# Patient Record
Sex: FEMALE | Race: AMERICAN INDIAN OR ALASKA NATIVE | NOT HISPANIC OR LATINO | Employment: OTHER | URBAN - METROPOLITAN AREA
[De-identification: names, ages, dates, MRNs, and addresses within clinical notes are randomized per-mention and may not be internally consistent; named-entity substitution may affect disease eponyms.]

---

## 2021-05-07 ENCOUNTER — OFFICE VISIT (OUTPATIENT)
Dept: FAMILY MEDICINE | Facility: CLINIC | Age: 41
End: 2021-05-07
Payer: COMMERCIAL

## 2021-05-07 ENCOUNTER — HOSPITAL ENCOUNTER (OUTPATIENT)
Dept: RADIOLOGY | Facility: CLINIC | Age: 41
Discharge: HOME | End: 2021-05-07
Attending: STUDENT IN AN ORGANIZED HEALTH CARE EDUCATION/TRAINING PROGRAM
Payer: COMMERCIAL

## 2021-05-07 ENCOUNTER — TELEPHONE (OUTPATIENT)
Dept: FAMILY MEDICINE | Facility: CLINIC | Age: 41
End: 2021-05-07

## 2021-05-07 ENCOUNTER — PATIENT OUTREACH (OUTPATIENT)
Dept: ADMINISTRATIVE | Facility: HOSPITAL | Age: 41
End: 2021-05-07

## 2021-05-07 VITALS
RESPIRATION RATE: 16 BRPM | SYSTOLIC BLOOD PRESSURE: 112 MMHG | WEIGHT: 253.31 LBS | OXYGEN SATURATION: 98 % | HEIGHT: 63 IN | BODY MASS INDEX: 44.88 KG/M2 | DIASTOLIC BLOOD PRESSURE: 74 MMHG | HEART RATE: 84 BPM | TEMPERATURE: 99 F

## 2021-05-07 DIAGNOSIS — R53.81 DEBILITY: ICD-10-CM

## 2021-05-07 DIAGNOSIS — Z99.3 WHEELCHAIR BOUND: ICD-10-CM

## 2021-05-07 DIAGNOSIS — R00.2 PALPITATIONS: ICD-10-CM

## 2021-05-07 DIAGNOSIS — Z00.00 ENCOUNTER FOR PREVENTIVE HEALTH EXAMINATION: Primary | ICD-10-CM

## 2021-05-07 DIAGNOSIS — Z11.59 NEED FOR HEPATITIS C SCREENING TEST: ICD-10-CM

## 2021-05-07 DIAGNOSIS — E66.01 SEVERE OBESITY WITH BODY MASS INDEX (BMI) OF 35.0 TO 39.9 WITH COMORBIDITY: ICD-10-CM

## 2021-05-07 DIAGNOSIS — Z12.31 ENCOUNTER FOR SCREENING MAMMOGRAM FOR MALIGNANT NEOPLASM OF BREAST: ICD-10-CM

## 2021-05-07 DIAGNOSIS — Z11.59 ENCOUNTER FOR HCV SCREENING TEST FOR LOW RISK PATIENT: ICD-10-CM

## 2021-05-07 DIAGNOSIS — Z13.220 SCREENING FOR LIPID DISORDERS: ICD-10-CM

## 2021-05-07 DIAGNOSIS — R53.81 PHYSICAL DECONDITIONING: ICD-10-CM

## 2021-05-07 DIAGNOSIS — Z79.899 POLYPHARMACY: ICD-10-CM

## 2021-05-07 DIAGNOSIS — G89.29 CHRONIC MIDLINE LOW BACK PAIN, UNSPECIFIED WHETHER SCIATICA PRESENT: ICD-10-CM

## 2021-05-07 DIAGNOSIS — R53.83 FATIGUE, UNSPECIFIED TYPE: ICD-10-CM

## 2021-05-07 DIAGNOSIS — F13.20 BENZODIAZEPINE DEPENDENCE, CONTINUOUS: ICD-10-CM

## 2021-05-07 DIAGNOSIS — Z51.81 THERAPEUTIC DRUG MONITORING: ICD-10-CM

## 2021-05-07 DIAGNOSIS — J30.9 ALLERGIC RHINITIS, UNSPECIFIED SEASONALITY, UNSPECIFIED TRIGGER: ICD-10-CM

## 2021-05-07 DIAGNOSIS — F17.219 NICOTINE DEPENDENCE, CIGARETTES, WITH UNSPECIFIED NICOTINE-INDUCED DISORDERS: ICD-10-CM

## 2021-05-07 DIAGNOSIS — M62.838 MUSCLE SPASMS OF BOTH LOWER EXTREMITIES: ICD-10-CM

## 2021-05-07 DIAGNOSIS — G47.00 INSOMNIA, UNSPECIFIED TYPE: ICD-10-CM

## 2021-05-07 DIAGNOSIS — I10 HYPERTENSION, UNSPECIFIED TYPE: ICD-10-CM

## 2021-05-07 DIAGNOSIS — F41.1 GAD (GENERALIZED ANXIETY DISORDER): ICD-10-CM

## 2021-05-07 DIAGNOSIS — G40.909 SEIZURE DISORDER: ICD-10-CM

## 2021-05-07 DIAGNOSIS — I69.354 HEMIPARESIS AFFECTING LEFT SIDE AS LATE EFFECT OF STROKE: ICD-10-CM

## 2021-05-07 DIAGNOSIS — G47.429 NARCOLEPSY DUE TO UNDERLYING CONDITION WITHOUT CATAPLEXY: ICD-10-CM

## 2021-05-07 DIAGNOSIS — M54.50 CHRONIC MIDLINE LOW BACK PAIN, UNSPECIFIED WHETHER SCIATICA PRESENT: ICD-10-CM

## 2021-05-07 DIAGNOSIS — N32.81 OVERACTIVE BLADDER: ICD-10-CM

## 2021-05-07 DIAGNOSIS — J45.20 MILD INTERMITTENT ASTHMA WITHOUT COMPLICATION: ICD-10-CM

## 2021-05-07 PROBLEM — K21.9 GASTROESOPHAGEAL REFLUX DISEASE: Status: ACTIVE | Noted: 2021-05-07

## 2021-05-07 PROBLEM — F19.20 POLYSUBSTANCE DEPENDENCE: Status: ACTIVE | Noted: 2021-05-07

## 2021-05-07 LAB
AMPHET+METHAMPHET UR QL: NEGATIVE
BARBITURATES UR QL SCN>200 NG/ML: NEGATIVE
BENZODIAZ UR QL SCN>200 NG/ML: NORMAL
BZE UR QL SCN: NEGATIVE
CANNABINOIDS UR QL SCN: NORMAL
CREAT UR-MCNC: 113 MG/DL (ref 15–325)
ETHANOL UR-MCNC: <10 MG/DL
METHADONE UR QL SCN>300 NG/ML: NEGATIVE
OPIATES UR QL SCN: NEGATIVE
PCP UR QL SCN>25 NG/ML: NEGATIVE
TOXICOLOGY INFORMATION: NORMAL

## 2021-05-07 PROCEDURE — 80307 DRUG TEST PRSMV CHEM ANLYZR: CPT | Performed by: STUDENT IN AN ORGANIZED HEALTH CARE EDUCATION/TRAINING PROGRAM

## 2021-05-07 PROCEDURE — 3008F PR BODY MASS INDEX (BMI) DOCUMENTED: ICD-10-PCS | Mod: CPTII,S$GLB,, | Performed by: STUDENT IN AN ORGANIZED HEALTH CARE EDUCATION/TRAINING PROGRAM

## 2021-05-07 PROCEDURE — 99999 PR PBB SHADOW E&M-NEW PATIENT-LVL V: ICD-10-PCS | Mod: PBBFAC,,, | Performed by: STUDENT IN AN ORGANIZED HEALTH CARE EDUCATION/TRAINING PROGRAM

## 2021-05-07 PROCEDURE — 99999 PR PBB SHADOW E&M-NEW PATIENT-LVL V: CPT | Mod: PBBFAC,,, | Performed by: STUDENT IN AN ORGANIZED HEALTH CARE EDUCATION/TRAINING PROGRAM

## 2021-05-07 PROCEDURE — 77063 MAMMO DIGITAL SCREENING BILAT WITH TOMO: ICD-10-PCS | Mod: 26,,, | Performed by: RADIOLOGY

## 2021-05-07 PROCEDURE — 1125F PR PAIN SEVERITY QUANTIFIED, PAIN PRESENT: ICD-10-PCS | Mod: S$GLB,,, | Performed by: STUDENT IN AN ORGANIZED HEALTH CARE EDUCATION/TRAINING PROGRAM

## 2021-05-07 PROCEDURE — 77067 SCR MAMMO BI INCL CAD: CPT | Mod: 26,,, | Performed by: RADIOLOGY

## 2021-05-07 PROCEDURE — 99204 PR OFFICE/OUTPT VISIT, NEW, LEVL IV, 45-59 MIN: ICD-10-PCS | Mod: S$GLB,,, | Performed by: STUDENT IN AN ORGANIZED HEALTH CARE EDUCATION/TRAINING PROGRAM

## 2021-05-07 PROCEDURE — 77067 SCR MAMMO BI INCL CAD: CPT | Mod: TC,PO

## 2021-05-07 PROCEDURE — 77067 MAMMO DIGITAL SCREENING BILAT WITH TOMO: ICD-10-PCS | Mod: 26,,, | Performed by: RADIOLOGY

## 2021-05-07 PROCEDURE — 3008F BODY MASS INDEX DOCD: CPT | Mod: CPTII,S$GLB,, | Performed by: STUDENT IN AN ORGANIZED HEALTH CARE EDUCATION/TRAINING PROGRAM

## 2021-05-07 PROCEDURE — 99204 OFFICE O/P NEW MOD 45 MIN: CPT | Mod: S$GLB,,, | Performed by: STUDENT IN AN ORGANIZED HEALTH CARE EDUCATION/TRAINING PROGRAM

## 2021-05-07 PROCEDURE — 1125F AMNT PAIN NOTED PAIN PRSNT: CPT | Mod: S$GLB,,, | Performed by: STUDENT IN AN ORGANIZED HEALTH CARE EDUCATION/TRAINING PROGRAM

## 2021-05-07 PROCEDURE — 77063 BREAST TOMOSYNTHESIS BI: CPT | Mod: 26,,, | Performed by: RADIOLOGY

## 2021-05-07 RX ORDER — LAMOTRIGINE 150 MG/1
200 TABLET ORAL 2 TIMES DAILY
COMMUNITY
End: 2021-09-20 | Stop reason: SDUPTHER

## 2021-05-07 RX ORDER — ALBUTEROL SULFATE 90 UG/1
180 POWDER, METERED RESPIRATORY (INHALATION) EVERY 4 HOURS
COMMUNITY
End: 2022-07-01

## 2021-05-07 RX ORDER — ARMODAFINIL 250 MG/1
250 TABLET ORAL DAILY
COMMUNITY
End: 2021-11-12

## 2021-05-07 RX ORDER — MONTELUKAST SODIUM 10 MG/1
10 TABLET ORAL NIGHTLY
Qty: 90 TABLET | Refills: 0 | Status: SHIPPED | OUTPATIENT
Start: 2021-05-07 | End: 2021-06-03

## 2021-05-07 RX ORDER — TOLTERODINE 2 MG/1
4 CAPSULE, EXTENDED RELEASE ORAL DAILY
Qty: 180 CAPSULE | Refills: 0 | Status: SHIPPED | OUTPATIENT
Start: 2021-05-07 | End: 2021-06-03

## 2021-05-07 RX ORDER — METOPROLOL SUCCINATE 50 MG/1
50 TABLET, EXTENDED RELEASE ORAL DAILY
Qty: 90 TABLET | Refills: 0 | Status: SHIPPED | OUTPATIENT
Start: 2021-05-07 | End: 2021-06-03

## 2021-05-07 RX ORDER — TEMAZEPAM 22.5 MG/1
30 CAPSULE ORAL NIGHTLY PRN
COMMUNITY
End: 2021-06-02 | Stop reason: CLARIF

## 2021-05-07 RX ORDER — HYDROCODONE BITARTRATE AND ACETAMINOPHEN 7.5; 325 MG/1; MG/1
1 TABLET ORAL EVERY 6 HOURS PRN
COMMUNITY
End: 2021-05-10 | Stop reason: SDUPTHER

## 2021-05-07 RX ORDER — TOLTERODINE 2 MG/1
4 CAPSULE, EXTENDED RELEASE ORAL DAILY
COMMUNITY
End: 2021-05-07 | Stop reason: SDUPTHER

## 2021-05-07 RX ORDER — MONTELUKAST SODIUM 10 MG/1
10 TABLET ORAL NIGHTLY
COMMUNITY
End: 2021-05-07 | Stop reason: SDUPTHER

## 2021-05-07 RX ORDER — GABAPENTIN 600 MG/1
600 TABLET ORAL 3 TIMES DAILY
COMMUNITY
End: 2021-05-26 | Stop reason: SDUPTHER

## 2021-05-07 RX ORDER — BENZTROPINE MESYLATE 1 MG/1
1 TABLET ORAL DAILY
COMMUNITY
End: 2022-09-13 | Stop reason: CLARIF

## 2021-05-07 RX ORDER — LANOLIN ALCOHOL/MO/W.PET/CERES
400 CREAM (GRAM) TOPICAL DAILY
COMMUNITY
End: 2021-05-07 | Stop reason: SDUPTHER

## 2021-05-07 RX ORDER — ACETAMINOPHEN 325 MG/1
325 TABLET ORAL EVERY 6 HOURS PRN
COMMUNITY

## 2021-05-07 RX ORDER — LORAZEPAM 0.5 MG/1
0.5 TABLET ORAL EVERY 6 HOURS PRN
COMMUNITY
End: 2023-02-09

## 2021-05-07 RX ORDER — LEVETIRACETAM 750 MG/1
750 TABLET ORAL 2 TIMES DAILY
COMMUNITY
End: 2021-09-20 | Stop reason: SDUPTHER

## 2021-05-07 RX ORDER — LOSARTAN POTASSIUM 50 MG/1
50 TABLET ORAL DAILY
COMMUNITY
End: 2021-05-07 | Stop reason: SDUPTHER

## 2021-05-07 RX ORDER — CYCLOBENZAPRINE HCL 10 MG
10 TABLET ORAL 3 TIMES DAILY PRN
COMMUNITY
End: 2021-05-07

## 2021-05-07 RX ORDER — FLUTICASONE PROPIONATE 50 MCG
1 SPRAY, SUSPENSION (ML) NASAL DAILY PRN
COMMUNITY
End: 2022-07-28

## 2021-05-07 RX ORDER — CANNABIDIOL 100 MG/ML
6 SOLUTION ORAL 2 TIMES DAILY
COMMUNITY
End: 2021-09-20 | Stop reason: SDUPTHER

## 2021-05-07 RX ORDER — GABAPENTIN 300 MG/1
300 CAPSULE ORAL 3 TIMES DAILY
COMMUNITY
End: 2021-05-26

## 2021-05-07 RX ORDER — METOPROLOL SUCCINATE 50 MG/1
50 TABLET, EXTENDED RELEASE ORAL DAILY
COMMUNITY
End: 2021-05-07 | Stop reason: SDUPTHER

## 2021-05-07 RX ORDER — LANOLIN ALCOHOL/MO/W.PET/CERES
400 CREAM (GRAM) TOPICAL DAILY
Qty: 90 TABLET | Refills: 0 | Status: SHIPPED | OUTPATIENT
Start: 2021-05-07 | End: 2021-08-05

## 2021-05-07 RX ORDER — CLOTRIMAZOLE 1 %
CREAM (GRAM) TOPICAL 2 TIMES DAILY PRN
COMMUNITY
End: 2022-09-13 | Stop reason: CLARIF

## 2021-05-07 RX ORDER — NORTRIPTYLINE HYDROCHLORIDE 25 MG/1
25 CAPSULE ORAL NIGHTLY
COMMUNITY
End: 2021-05-07

## 2021-05-07 RX ORDER — CHOLECALCIFEROL (VITAMIN D3) 25 MCG
1000 TABLET ORAL DAILY
COMMUNITY

## 2021-05-07 RX ORDER — LIDOCAINE 50 MG/G
1 PATCH TOPICAL
COMMUNITY
End: 2023-02-09

## 2021-05-07 RX ORDER — ONDANSETRON 4 MG/1
8 TABLET, FILM COATED ORAL EVERY 8 HOURS PRN
COMMUNITY

## 2021-05-07 RX ORDER — HYDROXYZINE HYDROCHLORIDE 25 MG/1
25 TABLET, FILM COATED ORAL 3 TIMES DAILY
COMMUNITY
End: 2021-05-07

## 2021-05-07 RX ORDER — LOSARTAN POTASSIUM 50 MG/1
50 TABLET ORAL NIGHTLY
Qty: 90 TABLET | Refills: 0 | Status: SHIPPED | OUTPATIENT
Start: 2021-05-07 | End: 2021-06-03

## 2021-05-07 RX ORDER — BENZTROPINE MESYLATE 0.5 MG/1
0.5 TABLET ORAL 2 TIMES DAILY
COMMUNITY
End: 2021-06-02 | Stop reason: CLARIF

## 2021-05-07 RX ORDER — LAMOTRIGINE 25 MG/1
25 TABLET ORAL 2 TIMES DAILY
COMMUNITY
End: 2021-09-20 | Stop reason: SDUPTHER

## 2021-05-10 ENCOUNTER — OFFICE VISIT (OUTPATIENT)
Dept: SPINE | Facility: CLINIC | Age: 41
End: 2021-05-10
Payer: COMMERCIAL

## 2021-05-10 VITALS — HEIGHT: 63 IN | WEIGHT: 253.31 LBS | BODY MASS INDEX: 44.88 KG/M2

## 2021-05-10 DIAGNOSIS — M54.2 CERVICALGIA: Primary | ICD-10-CM

## 2021-05-10 DIAGNOSIS — M54.50 CHRONIC MIDLINE LOW BACK PAIN, UNSPECIFIED WHETHER SCIATICA PRESENT: ICD-10-CM

## 2021-05-10 DIAGNOSIS — G89.29 CHRONIC MIDLINE LOW BACK PAIN, UNSPECIFIED WHETHER SCIATICA PRESENT: ICD-10-CM

## 2021-05-10 PROCEDURE — 1125F AMNT PAIN NOTED PAIN PRSNT: CPT | Mod: S$GLB,,, | Performed by: PHYSICAL MEDICINE & REHABILITATION

## 2021-05-10 PROCEDURE — 99204 PR OFFICE/OUTPT VISIT, NEW, LEVL IV, 45-59 MIN: ICD-10-PCS | Mod: S$GLB,,, | Performed by: PHYSICAL MEDICINE & REHABILITATION

## 2021-05-10 PROCEDURE — 99204 OFFICE O/P NEW MOD 45 MIN: CPT | Mod: S$GLB,,, | Performed by: PHYSICAL MEDICINE & REHABILITATION

## 2021-05-10 PROCEDURE — 3008F PR BODY MASS INDEX (BMI) DOCUMENTED: ICD-10-PCS | Mod: CPTII,S$GLB,, | Performed by: PHYSICAL MEDICINE & REHABILITATION

## 2021-05-10 PROCEDURE — 3008F BODY MASS INDEX DOCD: CPT | Mod: CPTII,S$GLB,, | Performed by: PHYSICAL MEDICINE & REHABILITATION

## 2021-05-10 PROCEDURE — 1125F PR PAIN SEVERITY QUANTIFIED, PAIN PRESENT: ICD-10-PCS | Mod: S$GLB,,, | Performed by: PHYSICAL MEDICINE & REHABILITATION

## 2021-05-10 RX ORDER — HYDROCODONE BITARTRATE AND ACETAMINOPHEN 7.5; 325 MG/1; MG/1
1 TABLET ORAL EVERY 6 HOURS PRN
Qty: 28 TABLET | Refills: 0 | Status: SHIPPED | OUTPATIENT
Start: 2021-05-10 | End: 2021-05-20 | Stop reason: SDUPTHER

## 2021-05-11 ENCOUNTER — TELEPHONE (OUTPATIENT)
Dept: NEUROLOGY | Facility: CLINIC | Age: 41
End: 2021-05-11

## 2021-05-11 ENCOUNTER — TELEPHONE (OUTPATIENT)
Dept: FAMILY MEDICINE | Facility: CLINIC | Age: 41
End: 2021-05-11

## 2021-05-17 ENCOUNTER — TELEPHONE (OUTPATIENT)
Dept: NEUROSURGERY | Facility: CLINIC | Age: 41
End: 2021-05-17

## 2021-05-17 DIAGNOSIS — M54.50 CHRONIC MIDLINE LOW BACK PAIN, UNSPECIFIED WHETHER SCIATICA PRESENT: ICD-10-CM

## 2021-05-17 DIAGNOSIS — G89.29 CHRONIC MIDLINE LOW BACK PAIN, UNSPECIFIED WHETHER SCIATICA PRESENT: ICD-10-CM

## 2021-05-17 DIAGNOSIS — G40.909 SEIZURE DISORDER: Primary | ICD-10-CM

## 2021-05-17 RX ORDER — GABAPENTIN 300 MG/1
300 CAPSULE ORAL 3 TIMES DAILY
Qty: 270 CAPSULE | Refills: 1 | Status: CANCELLED | OUTPATIENT
Start: 2021-05-17

## 2021-05-18 ENCOUNTER — PATIENT MESSAGE (OUTPATIENT)
Dept: FAMILY MEDICINE | Facility: CLINIC | Age: 41
End: 2021-05-18

## 2021-05-18 ENCOUNTER — DOCUMENTATION ONLY (OUTPATIENT)
Dept: FAMILY MEDICINE | Facility: CLINIC | Age: 41
End: 2021-05-18

## 2021-05-18 ENCOUNTER — TELEPHONE (OUTPATIENT)
Dept: FAMILY MEDICINE | Facility: CLINIC | Age: 41
End: 2021-05-18

## 2021-05-18 DIAGNOSIS — G40.812 NONINTRACTABLE LENNOX-GASTAUT SYNDROME WITHOUT STATUS EPILEPTICUS: ICD-10-CM

## 2021-05-18 DIAGNOSIS — I73.00 RAYNAUD'S PHENOMENON WITHOUT GANGRENE: ICD-10-CM

## 2021-05-18 DIAGNOSIS — E53.8 B12 DEFICIENCY: ICD-10-CM

## 2021-05-18 DIAGNOSIS — G89.4 CHRONIC PAIN SYNDROME: ICD-10-CM

## 2021-05-19 ENCOUNTER — TELEPHONE (OUTPATIENT)
Dept: FAMILY MEDICINE | Facility: CLINIC | Age: 41
End: 2021-05-19

## 2021-05-19 ENCOUNTER — TELEPHONE (OUTPATIENT)
Dept: SPINE | Facility: CLINIC | Age: 41
End: 2021-05-19

## 2021-05-19 DIAGNOSIS — G89.29 CHRONIC MIDLINE LOW BACK PAIN, UNSPECIFIED WHETHER SCIATICA PRESENT: ICD-10-CM

## 2021-05-19 DIAGNOSIS — M54.50 CHRONIC MIDLINE LOW BACK PAIN, UNSPECIFIED WHETHER SCIATICA PRESENT: ICD-10-CM

## 2021-05-20 RX ORDER — HYDROCODONE BITARTRATE AND ACETAMINOPHEN 7.5; 325 MG/1; MG/1
1 TABLET ORAL EVERY 6 HOURS PRN
Qty: 28 TABLET | Refills: 0 | Status: SHIPPED | OUTPATIENT
Start: 2021-05-20 | End: 2021-06-02 | Stop reason: CLARIF

## 2021-05-25 ENCOUNTER — OFFICE VISIT (OUTPATIENT)
Dept: PAIN MEDICINE | Facility: CLINIC | Age: 41
End: 2021-05-25
Payer: COMMERCIAL

## 2021-05-25 VITALS
BODY MASS INDEX: 45.18 KG/M2 | HEIGHT: 63 IN | WEIGHT: 255 LBS | DIASTOLIC BLOOD PRESSURE: 63 MMHG | HEART RATE: 81 BPM | SYSTOLIC BLOOD PRESSURE: 144 MMHG

## 2021-05-25 DIAGNOSIS — M54.50 CHRONIC MIDLINE LOW BACK PAIN, UNSPECIFIED WHETHER SCIATICA PRESENT: ICD-10-CM

## 2021-05-25 DIAGNOSIS — G89.29 CHRONIC MIDLINE LOW BACK PAIN, UNSPECIFIED WHETHER SCIATICA PRESENT: ICD-10-CM

## 2021-05-25 DIAGNOSIS — G40.909 SEIZURE DISORDER: ICD-10-CM

## 2021-05-25 DIAGNOSIS — M54.16 LUMBAR RADICULOPATHY: ICD-10-CM

## 2021-05-25 DIAGNOSIS — M50.30 DDD (DEGENERATIVE DISC DISEASE), CERVICAL: ICD-10-CM

## 2021-05-25 DIAGNOSIS — M51.36 DDD (DEGENERATIVE DISC DISEASE), LUMBAR: Primary | ICD-10-CM

## 2021-05-25 DIAGNOSIS — M51.34 DDD (DEGENERATIVE DISC DISEASE), THORACIC: ICD-10-CM

## 2021-05-25 DIAGNOSIS — F11.90 CHRONIC, CONTINUOUS USE OF OPIOIDS: ICD-10-CM

## 2021-05-25 PROCEDURE — 99999 PR PBB SHADOW E&M-EST. PATIENT-LVL V: CPT | Mod: PBBFAC,,, | Performed by: ANESTHESIOLOGY

## 2021-05-25 PROCEDURE — 3008F PR BODY MASS INDEX (BMI) DOCUMENTED: ICD-10-PCS | Mod: CPTII,S$GLB,, | Performed by: ANESTHESIOLOGY

## 2021-05-25 PROCEDURE — 1125F PR PAIN SEVERITY QUANTIFIED, PAIN PRESENT: ICD-10-PCS | Mod: S$GLB,,, | Performed by: ANESTHESIOLOGY

## 2021-05-25 PROCEDURE — 1125F AMNT PAIN NOTED PAIN PRSNT: CPT | Mod: S$GLB,,, | Performed by: ANESTHESIOLOGY

## 2021-05-25 PROCEDURE — 99214 OFFICE O/P EST MOD 30 MIN: CPT | Mod: S$GLB,,, | Performed by: ANESTHESIOLOGY

## 2021-05-25 PROCEDURE — 80307 DRUG TEST PRSMV CHEM ANLYZR: CPT | Performed by: ANESTHESIOLOGY

## 2021-05-25 PROCEDURE — 99999 PR PBB SHADOW E&M-EST. PATIENT-LVL V: ICD-10-PCS | Mod: PBBFAC,,, | Performed by: ANESTHESIOLOGY

## 2021-05-25 PROCEDURE — 99214 PR OFFICE/OUTPT VISIT, EST, LEVL IV, 30-39 MIN: ICD-10-PCS | Mod: S$GLB,,, | Performed by: ANESTHESIOLOGY

## 2021-05-25 PROCEDURE — 3008F BODY MASS INDEX DOCD: CPT | Mod: CPTII,S$GLB,, | Performed by: ANESTHESIOLOGY

## 2021-05-25 RX ORDER — HYDROCODONE BITARTRATE AND ACETAMINOPHEN 7.5; 325 MG/1; MG/1
1 TABLET ORAL EVERY 6 HOURS PRN
Qty: 120 TABLET | Refills: 0 | Status: ON HOLD | OUTPATIENT
Start: 2021-05-25 | End: 2021-06-14 | Stop reason: HOSPADM

## 2021-05-26 ENCOUNTER — PATIENT MESSAGE (OUTPATIENT)
Dept: ADMINISTRATIVE | Facility: OTHER | Age: 41
End: 2021-05-26

## 2021-05-26 RX ORDER — GABAPENTIN 600 MG/1
600 TABLET ORAL 3 TIMES DAILY
Qty: 90 TABLET | Refills: 0 | Status: SHIPPED | OUTPATIENT
Start: 2021-05-26 | End: 2022-05-09 | Stop reason: SDUPTHER

## 2021-05-26 RX ORDER — GABAPENTIN 300 MG/1
300 CAPSULE ORAL 3 TIMES DAILY
Qty: 90 CAPSULE | Refills: 0 | Status: CANCELLED | OUTPATIENT
Start: 2021-05-26

## 2021-05-31 LAB
6MAM UR QL: NOT DETECTED
7AMINOCLONAZEPAM UR QL: NOT DETECTED
A-OH ALPRAZ UR QL: NOT DETECTED
ALPHA-OH-MIDAZOLAM: NOT DETECTED
ALPRAZ UR QL: NOT DETECTED
AMPHET UR QL SCN: NOT DETECTED
ANNOTATION COMMENT IMP: NORMAL
ANNOTATION COMMENT IMP: NORMAL
BARBITURATES UR QL: NOT DETECTED
BUPRENORPHINE UR QL: NOT DETECTED
BZE UR QL: NOT DETECTED
CARBOXYTHC UR QL: PRESENT
CARISOPRODOL UR QL: NOT DETECTED
CLONAZEPAM UR QL: NOT DETECTED
CODEINE UR QL: NOT DETECTED
CREAT UR-MCNC: 364.2 MG/DL (ref 20–400)
DIAZEPAM UR QL: NOT DETECTED
ETHYL GLUCURONIDE UR QL: PRESENT
FENTANYL UR QL: NOT DETECTED
GABAPENTIN: NOT DETECTED
HYDROCODONE UR QL: PRESENT
HYDROMORPHONE UR QL: PRESENT
LORAZEPAM UR QL: NOT DETECTED
MDA UR QL: NOT DETECTED
MDEA UR QL: NOT DETECTED
MDMA UR QL: NOT DETECTED
ME-PHENIDATE UR QL: NOT DETECTED
METHADONE UR QL: NOT DETECTED
METHAMPHET UR QL: NOT DETECTED
MIDAZOLAM UR QL SCN: NOT DETECTED
MORPHINE UR QL: NOT DETECTED
NALOXONE: NOT DETECTED
NORBUPRENORPHINE UR QL CFM: NOT DETECTED
NORDIAZEPAM UR QL: NOT DETECTED
NORFENTANYL UR QL: NOT DETECTED
NORHYDROCODONE UR QL CFM: PRESENT
NORMEPERIDINE UR QL CFM: NOT DETECTED
NOROXYCODONE UR QL CFM: NOT DETECTED
NOROXYMORPHONE UR QL SCN: NOT DETECTED
OXAZEPAM UR QL: NOT DETECTED
OXYCODONE UR QL: NOT DETECTED
OXYMORPHONE UR QL: NOT DETECTED
PATHOLOGY STUDY: NORMAL
PCP UR QL: NOT DETECTED
PHENTERMINE UR QL: NOT DETECTED
PREGABALIN: NOT DETECTED
SERVICE CMNT-IMP: NORMAL
TAPENTADOL UR QL SCN: NOT DETECTED
TAPENTADOL-O-SULF: NOT DETECTED
TEMAZEPAM UR QL: NOT DETECTED
TRAMADOL UR QL: NOT DETECTED
ZOLPIDEM METABOLITE: NOT DETECTED
ZOLPIDEM UR QL: NOT DETECTED

## 2021-06-01 ENCOUNTER — HOSPITAL ENCOUNTER (OUTPATIENT)
Dept: RADIOLOGY | Facility: HOSPITAL | Age: 41
Discharge: HOME | End: 2021-06-01
Attending: NEUROLOGICAL SURGERY
Payer: COMMERCIAL

## 2021-06-01 ENCOUNTER — OFFICE VISIT (OUTPATIENT)
Dept: NEUROSURGERY | Facility: CLINIC | Age: 41
End: 2021-06-01
Payer: COMMERCIAL

## 2021-06-01 ENCOUNTER — TELEPHONE (OUTPATIENT)
Dept: FAMILY MEDICINE | Facility: CLINIC | Age: 41
End: 2021-06-01

## 2021-06-01 DIAGNOSIS — G40.812 NONINTRACTABLE LENNOX-GASTAUT SYNDROME WITHOUT STATUS EPILEPTICUS: Primary | ICD-10-CM

## 2021-06-01 DIAGNOSIS — Z96.89 S/P PLACEMENT OF VNS (VAGUS NERVE STIMULATION) DEVICE: ICD-10-CM

## 2021-06-01 DIAGNOSIS — Z96.89 S/P PLACEMENT OF VNS (VAGUS NERVE STIMULATION) DEVICE: Primary | ICD-10-CM

## 2021-06-01 PROCEDURE — 71046 X-RAY EXAM CHEST 2 VIEWS: CPT | Mod: 26,,, | Performed by: RADIOLOGY

## 2021-06-01 PROCEDURE — 99204 OFFICE O/P NEW MOD 45 MIN: CPT | Mod: S$GLB,,, | Performed by: NEUROLOGICAL SURGERY

## 2021-06-01 PROCEDURE — 71046 XR CHEST PA AND LATERAL: ICD-10-PCS | Mod: 26,,, | Performed by: RADIOLOGY

## 2021-06-01 PROCEDURE — 99999 PR PBB SHADOW E&M-EST. PATIENT-LVL III: CPT | Mod: PBBFAC,,, | Performed by: NEUROLOGICAL SURGERY

## 2021-06-01 PROCEDURE — 72040 X-RAY EXAM NECK SPINE 2-3 VW: CPT | Mod: 26,,, | Performed by: RADIOLOGY

## 2021-06-01 PROCEDURE — 72040 XR CERVICAL SPINE AP LATERAL: ICD-10-PCS | Mod: 26,,, | Performed by: RADIOLOGY

## 2021-06-01 PROCEDURE — 71046 X-RAY EXAM CHEST 2 VIEWS: CPT | Mod: TC

## 2021-06-01 PROCEDURE — 99204 PR OFFICE/OUTPT VISIT, NEW, LEVL IV, 45-59 MIN: ICD-10-PCS | Mod: S$GLB,,, | Performed by: NEUROLOGICAL SURGERY

## 2021-06-01 PROCEDURE — 99999 PR PBB SHADOW E&M-EST. PATIENT-LVL III: ICD-10-PCS | Mod: PBBFAC,,, | Performed by: NEUROLOGICAL SURGERY

## 2021-06-01 PROCEDURE — 72040 X-RAY EXAM NECK SPINE 2-3 VW: CPT | Mod: TC

## 2021-06-02 ENCOUNTER — TELEPHONE (OUTPATIENT)
Dept: FAMILY MEDICINE | Facility: CLINIC | Age: 41
End: 2021-06-02

## 2021-06-02 ENCOUNTER — TELEPHONE (OUTPATIENT)
Dept: SPINE | Facility: CLINIC | Age: 41
End: 2021-06-02

## 2021-06-02 ENCOUNTER — TELEPHONE (OUTPATIENT)
Dept: PREADMISSION TESTING | Facility: HOSPITAL | Age: 41
End: 2021-06-02

## 2021-06-02 DIAGNOSIS — G40.812 NONINTRACTABLE LENNOX-GASTAUT SYNDROME WITHOUT STATUS EPILEPTICUS: ICD-10-CM

## 2021-06-02 DIAGNOSIS — Z96.89 S/P PLACEMENT OF VNS (VAGUS NERVE STIMULATION) DEVICE: Primary | ICD-10-CM

## 2021-06-02 DIAGNOSIS — Z01.818 PREOPERATIVE TESTING: Primary | ICD-10-CM

## 2021-06-03 ENCOUNTER — LAB VISIT (OUTPATIENT)
Dept: LAB | Facility: HOSPITAL | Age: 41
End: 2021-06-03
Attending: ANESTHESIOLOGY
Payer: COMMERCIAL

## 2021-06-03 ENCOUNTER — OFFICE VISIT (OUTPATIENT)
Dept: FAMILY MEDICINE | Facility: CLINIC | Age: 41
End: 2021-06-03
Payer: COMMERCIAL

## 2021-06-03 VITALS
HEART RATE: 92 BPM | DIASTOLIC BLOOD PRESSURE: 80 MMHG | HEIGHT: 63 IN | BODY MASS INDEX: 45.32 KG/M2 | OXYGEN SATURATION: 98 % | TEMPERATURE: 99 F | RESPIRATION RATE: 16 BRPM | WEIGHT: 255.75 LBS | SYSTOLIC BLOOD PRESSURE: 124 MMHG

## 2021-06-03 DIAGNOSIS — E66.01 MORBID OBESITY: ICD-10-CM

## 2021-06-03 DIAGNOSIS — G89.4 CHRONIC PAIN SYNDROME: ICD-10-CM

## 2021-06-03 DIAGNOSIS — G47.429 NARCOLEPSY DUE TO UNDERLYING CONDITION WITHOUT CATAPLEXY: ICD-10-CM

## 2021-06-03 DIAGNOSIS — Z01.818 PREOPERATIVE TESTING: ICD-10-CM

## 2021-06-03 DIAGNOSIS — I69.354 HEMIPARESIS AFFECTING LEFT SIDE AS LATE EFFECT OF STROKE: ICD-10-CM

## 2021-06-03 DIAGNOSIS — Z86.73 HX OF TRANSIENT ISCHEMIC ATTACK (TIA): ICD-10-CM

## 2021-06-03 DIAGNOSIS — G40.812 NONINTRACTABLE LENNOX-GASTAUT SYNDROME WITHOUT STATUS EPILEPTICUS: ICD-10-CM

## 2021-06-03 DIAGNOSIS — M47.816 LUMBAR SPONDYLOSIS: ICD-10-CM

## 2021-06-03 DIAGNOSIS — F41.1 GAD (GENERALIZED ANXIETY DISORDER): ICD-10-CM

## 2021-06-03 DIAGNOSIS — Z01.818 PREOP EXAMINATION: Primary | ICD-10-CM

## 2021-06-03 DIAGNOSIS — J45.20 MILD INTERMITTENT ASTHMA WITHOUT COMPLICATION: ICD-10-CM

## 2021-06-03 DIAGNOSIS — E78.5 HYPERLIPIDEMIA, UNSPECIFIED HYPERLIPIDEMIA TYPE: ICD-10-CM

## 2021-06-03 DIAGNOSIS — G40.909 SEIZURE DISORDER: ICD-10-CM

## 2021-06-03 PROBLEM — M48.061 SPINAL STENOSIS OF LUMBAR REGION: Status: ACTIVE | Noted: 2021-06-03

## 2021-06-03 PROCEDURE — 36415 COLL VENOUS BLD VENIPUNCTURE: CPT | Mod: PO | Performed by: ANESTHESIOLOGY

## 2021-06-03 PROCEDURE — 3008F PR BODY MASS INDEX (BMI) DOCUMENTED: ICD-10-PCS | Mod: CPTII,S$GLB,, | Performed by: STUDENT IN AN ORGANIZED HEALTH CARE EDUCATION/TRAINING PROGRAM

## 2021-06-03 PROCEDURE — 93010 EKG 12-LEAD: ICD-10-PCS | Mod: S$GLB,,, | Performed by: INTERNAL MEDICINE

## 2021-06-03 PROCEDURE — 93005 ELECTROCARDIOGRAM TRACING: CPT | Mod: S$GLB,,, | Performed by: ANESTHESIOLOGY

## 2021-06-03 PROCEDURE — 99214 PR OFFICE/OUTPT VISIT, EST, LEVL IV, 30-39 MIN: ICD-10-PCS | Mod: S$GLB,,, | Performed by: STUDENT IN AN ORGANIZED HEALTH CARE EDUCATION/TRAINING PROGRAM

## 2021-06-03 PROCEDURE — 85730 THROMBOPLASTIN TIME PARTIAL: CPT | Performed by: ANESTHESIOLOGY

## 2021-06-03 PROCEDURE — 3008F BODY MASS INDEX DOCD: CPT | Mod: CPTII,S$GLB,, | Performed by: STUDENT IN AN ORGANIZED HEALTH CARE EDUCATION/TRAINING PROGRAM

## 2021-06-03 PROCEDURE — 99999 PR PBB SHADOW E&M-EST. PATIENT-LVL IV: ICD-10-PCS | Mod: PBBFAC,,, | Performed by: STUDENT IN AN ORGANIZED HEALTH CARE EDUCATION/TRAINING PROGRAM

## 2021-06-03 PROCEDURE — 93010 ELECTROCARDIOGRAM REPORT: CPT | Mod: S$GLB,,, | Performed by: INTERNAL MEDICINE

## 2021-06-03 PROCEDURE — 99214 OFFICE O/P EST MOD 30 MIN: CPT | Mod: S$GLB,,, | Performed by: STUDENT IN AN ORGANIZED HEALTH CARE EDUCATION/TRAINING PROGRAM

## 2021-06-03 PROCEDURE — 93005 EKG 12-LEAD: ICD-10-PCS | Mod: S$GLB,,, | Performed by: ANESTHESIOLOGY

## 2021-06-03 PROCEDURE — 85610 PROTHROMBIN TIME: CPT | Performed by: ANESTHESIOLOGY

## 2021-06-03 PROCEDURE — 1126F PR PAIN SEVERITY QUANTIFIED, NO PAIN PRESENT: ICD-10-PCS | Mod: S$GLB,,, | Performed by: STUDENT IN AN ORGANIZED HEALTH CARE EDUCATION/TRAINING PROGRAM

## 2021-06-03 PROCEDURE — 99999 PR PBB SHADOW E&M-EST. PATIENT-LVL IV: CPT | Mod: PBBFAC,,, | Performed by: STUDENT IN AN ORGANIZED HEALTH CARE EDUCATION/TRAINING PROGRAM

## 2021-06-03 PROCEDURE — 86900 BLOOD TYPING SEROLOGIC ABO: CPT | Performed by: ANESTHESIOLOGY

## 2021-06-03 PROCEDURE — 1126F AMNT PAIN NOTED NONE PRSNT: CPT | Mod: S$GLB,,, | Performed by: STUDENT IN AN ORGANIZED HEALTH CARE EDUCATION/TRAINING PROGRAM

## 2021-06-03 RX ORDER — CYCLOBENZAPRINE HCL 10 MG
TABLET ORAL
COMMUNITY
End: 2023-02-09

## 2021-06-03 RX ORDER — BUDESONIDE 0.5 MG/2ML
INHALANT ORAL
COMMUNITY
End: 2022-07-01

## 2021-06-03 RX ORDER — ROSUVASTATIN CALCIUM 20 MG/1
20 TABLET, COATED ORAL DAILY
Qty: 90 TABLET | Refills: 3 | Status: SHIPPED | OUTPATIENT
Start: 2021-06-03 | End: 2021-11-12 | Stop reason: SDUPTHER

## 2021-06-04 LAB
ABO + RH BLD: NORMAL
APTT BLDCRRT: 27.1 SEC (ref 21–32)
BLD GP AB SCN CELLS X3 SERPL QL: NORMAL
INR PPP: 1 (ref 0.8–1.2)
PROTHROMBIN TIME: 10.7 SEC (ref 9–12.5)

## 2021-06-08 ENCOUNTER — TELEPHONE (OUTPATIENT)
Dept: SPINE | Facility: CLINIC | Age: 41
End: 2021-06-08

## 2021-06-11 ENCOUNTER — TELEPHONE (OUTPATIENT)
Dept: NEUROSURGERY | Facility: CLINIC | Age: 41
End: 2021-06-11

## 2021-06-12 ENCOUNTER — LAB VISIT (OUTPATIENT)
Dept: PRIMARY CARE CLINIC | Facility: CLINIC | Age: 41
End: 2021-06-12
Payer: COMMERCIAL

## 2021-06-12 DIAGNOSIS — Z01.818 PRE-OP TESTING: ICD-10-CM

## 2021-06-12 PROCEDURE — U0005 INFEC AGEN DETEC AMPLI PROBE: HCPCS | Performed by: NEUROLOGICAL SURGERY

## 2021-06-12 PROCEDURE — U0003 INFECTIOUS AGENT DETECTION BY NUCLEIC ACID (DNA OR RNA); SEVERE ACUTE RESPIRATORY SYNDROME CORONAVIRUS 2 (SARS-COV-2) (CORONAVIRUS DISEASE [COVID-19]), AMPLIFIED PROBE TECHNIQUE, MAKING USE OF HIGH THROUGHPUT TECHNOLOGIES AS DESCRIBED BY CMS-2020-01-R: HCPCS | Performed by: NEUROLOGICAL SURGERY

## 2021-06-14 ENCOUNTER — ANESTHESIA (OUTPATIENT)
Dept: SURGERY | Facility: HOSPITAL | Age: 41
End: 2021-06-14
Payer: COMMERCIAL

## 2021-06-14 ENCOUNTER — ANESTHESIA EVENT (OUTPATIENT)
Dept: SURGERY | Facility: HOSPITAL | Age: 41
End: 2021-06-14
Payer: COMMERCIAL

## 2021-06-14 ENCOUNTER — HOSPITAL ENCOUNTER (OUTPATIENT)
Facility: HOSPITAL | Age: 41
Discharge: HOME | End: 2021-06-14
Attending: NEUROLOGICAL SURGERY | Admitting: NEUROLOGICAL SURGERY
Payer: COMMERCIAL

## 2021-06-14 VITALS
HEIGHT: 63 IN | BODY MASS INDEX: 45.18 KG/M2 | HEART RATE: 69 BPM | TEMPERATURE: 98 F | DIASTOLIC BLOOD PRESSURE: 70 MMHG | RESPIRATION RATE: 16 BRPM | OXYGEN SATURATION: 100 % | WEIGHT: 255 LBS | SYSTOLIC BLOOD PRESSURE: 126 MMHG

## 2021-06-14 DIAGNOSIS — G40.812 LENNOX-GASTAUT SYNDROME: ICD-10-CM

## 2021-06-14 LAB
SARS-COV-2 RDRP RESP QL NAA+PROBE: NEGATIVE
SARS-COV-2 RNA RESP QL NAA+PROBE: NOT DETECTED

## 2021-06-14 PROCEDURE — 25000003 PHARM REV CODE 250: Performed by: NURSE ANESTHETIST, CERTIFIED REGISTERED

## 2021-06-14 PROCEDURE — 64569 REVISE/REPL VAGUS N ELTRD: CPT | Mod: ,,, | Performed by: NEUROLOGICAL SURGERY

## 2021-06-14 PROCEDURE — 36000706: Performed by: NEUROLOGICAL SURGERY

## 2021-06-14 PROCEDURE — 64569 PR REVISE/REPLACE CRANIAL NERVE STIM ELECTRODE/CONNECT PULSE GEN: ICD-10-PCS | Mod: ,,, | Performed by: NEUROLOGICAL SURGERY

## 2021-06-14 PROCEDURE — D9220A PRA ANESTHESIA: ICD-10-PCS | Mod: ,,, | Performed by: NURSE ANESTHETIST, CERTIFIED REGISTERED

## 2021-06-14 PROCEDURE — D9220A PRA ANESTHESIA: Mod: ,,, | Performed by: STUDENT IN AN ORGANIZED HEALTH CARE EDUCATION/TRAINING PROGRAM

## 2021-06-14 PROCEDURE — 37000009 HC ANESTHESIA EA ADD 15 MINS: Performed by: NEUROLOGICAL SURGERY

## 2021-06-14 PROCEDURE — D9220A PRA ANESTHESIA: Mod: ,,, | Performed by: NURSE ANESTHETIST, CERTIFIED REGISTERED

## 2021-06-14 PROCEDURE — U0002 COVID-19 LAB TEST NON-CDC: HCPCS | Performed by: NEUROLOGICAL SURGERY

## 2021-06-14 PROCEDURE — 36000707: Performed by: NEUROLOGICAL SURGERY

## 2021-06-14 PROCEDURE — 63600175 PHARM REV CODE 636 W HCPCS: Performed by: STUDENT IN AN ORGANIZED HEALTH CARE EDUCATION/TRAINING PROGRAM

## 2021-06-14 PROCEDURE — 25000003 PHARM REV CODE 250: Performed by: STUDENT IN AN ORGANIZED HEALTH CARE EDUCATION/TRAINING PROGRAM

## 2021-06-14 PROCEDURE — 25000003 PHARM REV CODE 250: Performed by: NEUROLOGICAL SURGERY

## 2021-06-14 PROCEDURE — 37000008 HC ANESTHESIA 1ST 15 MINUTES: Performed by: NEUROLOGICAL SURGERY

## 2021-06-14 PROCEDURE — 71000015 HC POSTOP RECOV 1ST HR: Performed by: NEUROLOGICAL SURGERY

## 2021-06-14 PROCEDURE — 63600175 PHARM REV CODE 636 W HCPCS: Performed by: NURSE ANESTHETIST, CERTIFIED REGISTERED

## 2021-06-14 PROCEDURE — 71000044 HC DOSC ROUTINE RECOVERY FIRST HOUR: Performed by: NEUROLOGICAL SURGERY

## 2021-06-14 PROCEDURE — D9220A PRA ANESTHESIA: ICD-10-PCS | Mod: ,,, | Performed by: STUDENT IN AN ORGANIZED HEALTH CARE EDUCATION/TRAINING PROGRAM

## 2021-06-14 RX ORDER — PROPOFOL 10 MG/ML
VIAL (ML) INTRAVENOUS
Status: DISCONTINUED | OUTPATIENT
Start: 2021-06-14 | End: 2021-06-14

## 2021-06-14 RX ORDER — SODIUM CHLORIDE 9 MG/ML
INJECTION, SOLUTION INTRAVENOUS CONTINUOUS
Status: DISCONTINUED | OUTPATIENT
Start: 2021-06-14 | End: 2021-06-14 | Stop reason: HOSPADM

## 2021-06-14 RX ORDER — MUPIROCIN 20 MG/G
1 OINTMENT TOPICAL 2 TIMES DAILY
Status: DISCONTINUED | OUTPATIENT
Start: 2021-06-14 | End: 2021-06-14 | Stop reason: HOSPADM

## 2021-06-14 RX ORDER — OXYCODONE HYDROCHLORIDE 5 MG/1
5 TABLET ORAL EVERY 6 HOURS PRN
Status: DISCONTINUED | OUTPATIENT
Start: 2021-06-14 | End: 2021-06-14 | Stop reason: HOSPADM

## 2021-06-14 RX ORDER — HYDROCODONE BITARTRATE AND ACETAMINOPHEN 7.5; 325 MG/1; MG/1
1 TABLET ORAL EVERY 6 HOURS PRN
Qty: 28 TABLET | Refills: 0 | Status: SHIPPED | OUTPATIENT
Start: 2021-06-14 | End: 2021-06-21

## 2021-06-14 RX ORDER — LIDOCAINE HYDROCHLORIDE 10 MG/ML
1 INJECTION, SOLUTION EPIDURAL; INFILTRATION; INTRACAUDAL; PERINEURAL ONCE
Status: COMPLETED | OUTPATIENT
Start: 2021-06-14 | End: 2021-06-14

## 2021-06-14 RX ORDER — MIDAZOLAM HYDROCHLORIDE 1 MG/ML
INJECTION, SOLUTION INTRAMUSCULAR; INTRAVENOUS
Status: DISCONTINUED | OUTPATIENT
Start: 2021-06-14 | End: 2021-06-14

## 2021-06-14 RX ORDER — FENTANYL CITRATE 50 UG/ML
INJECTION, SOLUTION INTRAMUSCULAR; INTRAVENOUS
Status: DISCONTINUED | OUTPATIENT
Start: 2021-06-14 | End: 2021-06-14

## 2021-06-14 RX ORDER — DEXMEDETOMIDINE HYDROCHLORIDE 100 UG/ML
INJECTION, SOLUTION INTRAVENOUS
Status: DISCONTINUED | OUTPATIENT
Start: 2021-06-14 | End: 2021-06-14

## 2021-06-14 RX ORDER — HYDROMORPHONE HYDROCHLORIDE 2 MG/ML
INJECTION, SOLUTION INTRAMUSCULAR; INTRAVENOUS; SUBCUTANEOUS
Status: DISCONTINUED | OUTPATIENT
Start: 2021-06-14 | End: 2021-06-14

## 2021-06-14 RX ORDER — CEFAZOLIN SODIUM 1 G/3ML
2 INJECTION, POWDER, FOR SOLUTION INTRAMUSCULAR; INTRAVENOUS
Status: COMPLETED | OUTPATIENT
Start: 2021-06-14 | End: 2021-06-14

## 2021-06-14 RX ORDER — ROCURONIUM BROMIDE 10 MG/ML
INJECTION, SOLUTION INTRAVENOUS
Status: DISCONTINUED | OUTPATIENT
Start: 2021-06-14 | End: 2021-06-14

## 2021-06-14 RX ORDER — SODIUM CHLORIDE 0.9 % (FLUSH) 0.9 %
10 SYRINGE (ML) INJECTION
Status: DISCONTINUED | OUTPATIENT
Start: 2021-06-14 | End: 2021-06-14 | Stop reason: HOSPADM

## 2021-06-14 RX ORDER — ONDANSETRON 2 MG/ML
INJECTION INTRAMUSCULAR; INTRAVENOUS
Status: DISCONTINUED | OUTPATIENT
Start: 2021-06-14 | End: 2021-06-14

## 2021-06-14 RX ORDER — LIDOCAINE HYDROCHLORIDE AND EPINEPHRINE 10; 10 MG/ML; UG/ML
INJECTION, SOLUTION INFILTRATION; PERINEURAL
Status: DISCONTINUED | OUTPATIENT
Start: 2021-06-14 | End: 2021-06-14 | Stop reason: HOSPADM

## 2021-06-14 RX ORDER — LIDOCAINE HYDROCHLORIDE 20 MG/ML
INJECTION INTRAVENOUS
Status: DISCONTINUED | OUTPATIENT
Start: 2021-06-14 | End: 2021-06-14

## 2021-06-14 RX ORDER — NEOSTIGMINE METHYLSULFATE 0.5 MG/ML
INJECTION, SOLUTION INTRAVENOUS
Status: DISCONTINUED | OUTPATIENT
Start: 2021-06-14 | End: 2021-06-14

## 2021-06-14 RX ORDER — HYDROMORPHONE HYDROCHLORIDE 1 MG/ML
0.2 INJECTION, SOLUTION INTRAMUSCULAR; INTRAVENOUS; SUBCUTANEOUS EVERY 5 MIN PRN
Status: DISCONTINUED | OUTPATIENT
Start: 2021-06-14 | End: 2021-06-14 | Stop reason: HOSPADM

## 2021-06-14 RX ORDER — MUPIROCIN 20 MG/G
OINTMENT TOPICAL
Status: DISCONTINUED | OUTPATIENT
Start: 2021-06-14 | End: 2021-06-14 | Stop reason: HOSPADM

## 2021-06-14 RX ORDER — CEPHALEXIN 500 MG/1
500 CAPSULE ORAL EVERY 12 HOURS
Qty: 10 CAPSULE | Refills: 0 | Status: SHIPPED | OUTPATIENT
Start: 2021-06-14 | End: 2021-06-19

## 2021-06-14 RX ADMIN — FENTANYL CITRATE 50 MCG: 50 INJECTION, SOLUTION INTRAMUSCULAR; INTRAVENOUS at 01:06

## 2021-06-14 RX ADMIN — ROCURONIUM BROMIDE 30 MG: 10 INJECTION, SOLUTION INTRAVENOUS at 01:06

## 2021-06-14 RX ADMIN — MIDAZOLAM HYDROCHLORIDE 2 MG: 1 INJECTION, SOLUTION INTRAMUSCULAR; INTRAVENOUS at 01:06

## 2021-06-14 RX ADMIN — PROPOFOL 200 MG: 10 INJECTION, EMULSION INTRAVENOUS at 01:06

## 2021-06-14 RX ADMIN — LIDOCAINE HYDROCHLORIDE 2 MG: 10 INJECTION, SOLUTION EPIDURAL; INFILTRATION; INTRACAUDAL at 11:06

## 2021-06-14 RX ADMIN — CEFAZOLIN 2 G: 330 INJECTION, POWDER, FOR SOLUTION INTRAMUSCULAR; INTRAVENOUS at 01:06

## 2021-06-14 RX ADMIN — DEXMEDETOMIDINE HYDROCHLORIDE 12 MCG: 100 INJECTION, SOLUTION, CONCENTRATE INTRAVENOUS at 02:06

## 2021-06-14 RX ADMIN — MIDAZOLAM HYDROCHLORIDE 2 MG: 1 INJECTION, SOLUTION INTRAMUSCULAR; INTRAVENOUS at 12:06

## 2021-06-14 RX ADMIN — GLYCOPYRROLATE 0.6 MCG: 0.2 INJECTION, SOLUTION INTRAMUSCULAR; INTRAVITREAL at 02:06

## 2021-06-14 RX ADMIN — ONDANSETRON 4 MG: 2 INJECTION INTRAMUSCULAR; INTRAVENOUS at 01:06

## 2021-06-14 RX ADMIN — LIDOCAINE HYDROCHLORIDE 60 MG: 20 INJECTION, SOLUTION INTRAVENOUS at 01:06

## 2021-06-14 RX ADMIN — NEOSTIGMINE METHYLSULFATE 5 MG: 0.5 INJECTION INTRAVENOUS at 02:06

## 2021-06-14 RX ADMIN — HYDROMORPHONE HYDROCHLORIDE 0.8 MG: 2 INJECTION INTRAMUSCULAR; INTRAVENOUS; SUBCUTANEOUS at 02:06

## 2021-06-14 RX ADMIN — ROCURONIUM BROMIDE 20 MG: 10 INJECTION, SOLUTION INTRAVENOUS at 01:06

## 2021-06-14 RX ADMIN — OXYCODONE 5 MG: 5 TABLET ORAL at 04:06

## 2021-06-14 RX ADMIN — SODIUM CHLORIDE: 0.9 INJECTION, SOLUTION INTRAVENOUS at 11:06

## 2021-06-14 RX ADMIN — ROCURONIUM BROMIDE 40 MG: 10 INJECTION, SOLUTION INTRAVENOUS at 01:06

## 2021-06-17 ENCOUNTER — PATIENT OUTREACH (OUTPATIENT)
Dept: ADMINISTRATIVE | Facility: OTHER | Age: 41
End: 2021-06-17

## 2021-06-21 ENCOUNTER — OFFICE VISIT (OUTPATIENT)
Dept: SPINE | Facility: CLINIC | Age: 41
End: 2021-06-21
Payer: COMMERCIAL

## 2021-06-21 DIAGNOSIS — G89.29 CHRONIC MIDLINE LOW BACK PAIN, UNSPECIFIED WHETHER SCIATICA PRESENT: Primary | ICD-10-CM

## 2021-06-21 DIAGNOSIS — M54.50 CHRONIC MIDLINE LOW BACK PAIN, UNSPECIFIED WHETHER SCIATICA PRESENT: Primary | ICD-10-CM

## 2021-06-21 PROCEDURE — 99213 PR OFFICE/OUTPT VISIT, EST, LEVL III, 20-29 MIN: ICD-10-PCS | Mod: S$GLB,,, | Performed by: PHYSICAL MEDICINE & REHABILITATION

## 2021-06-21 PROCEDURE — 99213 OFFICE O/P EST LOW 20 MIN: CPT | Mod: S$GLB,,, | Performed by: PHYSICAL MEDICINE & REHABILITATION

## 2021-06-22 ENCOUNTER — TELEPHONE (OUTPATIENT)
Dept: SPINE | Facility: CLINIC | Age: 41
End: 2021-06-22

## 2021-06-22 DIAGNOSIS — M48.061 SPINAL STENOSIS OF LUMBAR REGION, UNSPECIFIED WHETHER NEUROGENIC CLAUDICATION PRESENT: Primary | ICD-10-CM

## 2021-06-23 ENCOUNTER — TELEPHONE (OUTPATIENT)
Dept: PAIN MEDICINE | Facility: CLINIC | Age: 41
End: 2021-06-23

## 2021-06-23 DIAGNOSIS — Z01.818 PRE-OP TESTING: Primary | ICD-10-CM

## 2021-06-24 ENCOUNTER — TELEPHONE (OUTPATIENT)
Dept: NEUROSURGERY | Facility: CLINIC | Age: 41
End: 2021-06-24

## 2021-06-24 ENCOUNTER — TELEPHONE (OUTPATIENT)
Dept: NEUROLOGY | Facility: CLINIC | Age: 41
End: 2021-06-24

## 2021-06-28 ENCOUNTER — TELEPHONE (OUTPATIENT)
Dept: NEUROSURGERY | Facility: CLINIC | Age: 41
End: 2021-06-28

## 2021-06-28 ENCOUNTER — PATIENT MESSAGE (OUTPATIENT)
Dept: NEUROSURGERY | Facility: CLINIC | Age: 41
End: 2021-06-28

## 2021-06-29 ENCOUNTER — OFFICE VISIT (OUTPATIENT)
Dept: PAIN MEDICINE | Facility: CLINIC | Age: 41
End: 2021-06-29
Payer: COMMERCIAL

## 2021-06-29 VITALS
HEIGHT: 63 IN | BODY MASS INDEX: 45.18 KG/M2 | WEIGHT: 255 LBS | DIASTOLIC BLOOD PRESSURE: 81 MMHG | SYSTOLIC BLOOD PRESSURE: 124 MMHG | HEART RATE: 96 BPM

## 2021-06-29 DIAGNOSIS — M51.36 DDD (DEGENERATIVE DISC DISEASE), LUMBAR: Primary | ICD-10-CM

## 2021-06-29 DIAGNOSIS — F11.90 CHRONIC, CONTINUOUS USE OF OPIOIDS: ICD-10-CM

## 2021-06-29 DIAGNOSIS — M54.16 LUMBAR RADICULOPATHY: ICD-10-CM

## 2021-06-29 PROCEDURE — 3008F PR BODY MASS INDEX (BMI) DOCUMENTED: ICD-10-PCS | Mod: CPTII,S$GLB,, | Performed by: ANESTHESIOLOGY

## 2021-06-29 PROCEDURE — 99999 PR PBB SHADOW E&M-EST. PATIENT-LVL III: CPT | Mod: PBBFAC,,, | Performed by: ANESTHESIOLOGY

## 2021-06-29 PROCEDURE — 1125F AMNT PAIN NOTED PAIN PRSNT: CPT | Mod: S$GLB,,, | Performed by: ANESTHESIOLOGY

## 2021-06-29 PROCEDURE — 1125F PR PAIN SEVERITY QUANTIFIED, PAIN PRESENT: ICD-10-PCS | Mod: S$GLB,,, | Performed by: ANESTHESIOLOGY

## 2021-06-29 PROCEDURE — 99214 PR OFFICE/OUTPT VISIT, EST, LEVL IV, 30-39 MIN: ICD-10-PCS | Mod: S$GLB,,, | Performed by: ANESTHESIOLOGY

## 2021-06-29 PROCEDURE — 99214 OFFICE O/P EST MOD 30 MIN: CPT | Mod: S$GLB,,, | Performed by: ANESTHESIOLOGY

## 2021-06-29 PROCEDURE — 80307 DRUG TEST PRSMV CHEM ANLYZR: CPT | Performed by: ANESTHESIOLOGY

## 2021-06-29 PROCEDURE — 99999 PR PBB SHADOW E&M-EST. PATIENT-LVL III: ICD-10-PCS | Mod: PBBFAC,,, | Performed by: ANESTHESIOLOGY

## 2021-06-29 PROCEDURE — 3008F BODY MASS INDEX DOCD: CPT | Mod: CPTII,S$GLB,, | Performed by: ANESTHESIOLOGY

## 2021-06-29 RX ORDER — HYDROCODONE BITARTRATE AND ACETAMINOPHEN 7.5; 325 MG/1; MG/1
1 TABLET ORAL EVERY 6 HOURS PRN
Qty: 120 TABLET | Refills: 0 | Status: SHIPPED | OUTPATIENT
Start: 2021-06-29 | End: 2021-07-26 | Stop reason: SDUPTHER

## 2021-07-04 LAB

## 2021-07-06 ENCOUNTER — PATIENT MESSAGE (OUTPATIENT)
Dept: RHEUMATOLOGY | Facility: CLINIC | Age: 41
End: 2021-07-06

## 2021-07-07 ENCOUNTER — PATIENT MESSAGE (OUTPATIENT)
Dept: ADMINISTRATIVE | Facility: HOSPITAL | Age: 41
End: 2021-07-07

## 2021-07-09 ENCOUNTER — LAB VISIT (OUTPATIENT)
Dept: PRIMARY CARE CLINIC | Facility: CLINIC | Age: 41
End: 2021-07-09
Payer: COMMERCIAL

## 2021-07-09 DIAGNOSIS — Z01.818 PRE-OP TESTING: ICD-10-CM

## 2021-07-09 PROCEDURE — U0005 INFEC AGEN DETEC AMPLI PROBE: HCPCS | Performed by: ANESTHESIOLOGY

## 2021-07-09 PROCEDURE — U0003 INFECTIOUS AGENT DETECTION BY NUCLEIC ACID (DNA OR RNA); SEVERE ACUTE RESPIRATORY SYNDROME CORONAVIRUS 2 (SARS-COV-2) (CORONAVIRUS DISEASE [COVID-19]), AMPLIFIED PROBE TECHNIQUE, MAKING USE OF HIGH THROUGHPUT TECHNOLOGIES AS DESCRIBED BY CMS-2020-01-R: HCPCS | Performed by: ANESTHESIOLOGY

## 2021-07-10 LAB
SARS-COV-2 RNA RESP QL NAA+PROBE: NOT DETECTED
SARS-COV-2- CYCLE NUMBER: -1

## 2021-07-12 ENCOUNTER — HOSPITAL ENCOUNTER (OUTPATIENT)
Facility: AMBULARY SURGERY CENTER | Age: 41
Discharge: HOME | End: 2021-07-12
Attending: ANESTHESIOLOGY | Admitting: ANESTHESIOLOGY
Payer: COMMERCIAL

## 2021-07-12 DIAGNOSIS — M51.36 DEGENERATIVE DISC DISEASE, LUMBAR: Primary | ICD-10-CM

## 2021-07-12 PROBLEM — M51.369 DEGENERATIVE DISC DISEASE, LUMBAR: Status: ACTIVE | Noted: 2021-07-12

## 2021-07-12 PROCEDURE — 62323 PR INJ LUMBAR/SACRAL, W/IMAGING GUIDANCE: ICD-10-PCS | Mod: ,,, | Performed by: ANESTHESIOLOGY

## 2021-07-12 PROCEDURE — 62323 NJX INTERLAMINAR LMBR/SAC: CPT | Performed by: ANESTHESIOLOGY

## 2021-07-12 PROCEDURE — 62323 NJX INTERLAMINAR LMBR/SAC: CPT | Mod: ,,, | Performed by: ANESTHESIOLOGY

## 2021-07-12 RX ORDER — SODIUM CHLORIDE 9 MG/ML
INJECTION, SOLUTION INTRAMUSCULAR; INTRAVENOUS; SUBCUTANEOUS
Status: DISCONTINUED | OUTPATIENT
Start: 2021-07-12 | End: 2021-07-12 | Stop reason: HOSPADM

## 2021-07-12 RX ORDER — LIDOCAINE HYDROCHLORIDE 10 MG/ML
INJECTION, SOLUTION EPIDURAL; INFILTRATION; INTRACAUDAL; PERINEURAL
Status: DISCONTINUED | OUTPATIENT
Start: 2021-07-12 | End: 2021-07-12 | Stop reason: HOSPADM

## 2021-07-12 RX ORDER — MIDAZOLAM HYDROCHLORIDE 2 MG/2ML
INJECTION, SOLUTION INTRAMUSCULAR; INTRAVENOUS
Status: DISCONTINUED | OUTPATIENT
Start: 2021-07-12 | End: 2021-07-12 | Stop reason: HOSPADM

## 2021-07-12 RX ORDER — SODIUM CHLORIDE, SODIUM LACTATE, POTASSIUM CHLORIDE, CALCIUM CHLORIDE 600; 310; 30; 20 MG/100ML; MG/100ML; MG/100ML; MG/100ML
INJECTION, SOLUTION INTRAVENOUS ONCE AS NEEDED
Status: COMPLETED | OUTPATIENT
Start: 2021-07-12 | End: 2021-07-12

## 2021-07-12 RX ORDER — FENTANYL CITRATE 50 UG/ML
INJECTION, SOLUTION INTRAMUSCULAR; INTRAVENOUS
Status: DISCONTINUED | OUTPATIENT
Start: 2021-07-12 | End: 2021-07-12 | Stop reason: HOSPADM

## 2021-07-12 RX ORDER — DEXAMETHASONE SODIUM PHOSPHATE 10 MG/ML
INJECTION INTRAMUSCULAR; INTRAVENOUS
Status: DISCONTINUED | OUTPATIENT
Start: 2021-07-12 | End: 2021-07-12 | Stop reason: HOSPADM

## 2021-07-12 RX ADMIN — SODIUM CHLORIDE, SODIUM LACTATE, POTASSIUM CHLORIDE, CALCIUM CHLORIDE: 600; 310; 30; 20 INJECTION, SOLUTION INTRAVENOUS at 12:07

## 2021-07-16 ENCOUNTER — TELEPHONE (OUTPATIENT)
Dept: NEUROLOGY | Facility: CLINIC | Age: 41
End: 2021-07-16

## 2021-07-16 VITALS
RESPIRATION RATE: 18 BRPM | WEIGHT: 255 LBS | BODY MASS INDEX: 45.18 KG/M2 | DIASTOLIC BLOOD PRESSURE: 78 MMHG | HEIGHT: 63 IN | SYSTOLIC BLOOD PRESSURE: 124 MMHG | OXYGEN SATURATION: 98 % | TEMPERATURE: 34 F | HEART RATE: 87 BPM

## 2021-07-22 ENCOUNTER — TELEPHONE (OUTPATIENT)
Dept: SPINE | Facility: CLINIC | Age: 41
End: 2021-07-22

## 2021-07-22 ENCOUNTER — TELEPHONE (OUTPATIENT)
Dept: PULMONOLOGY | Facility: CLINIC | Age: 41
End: 2021-07-22

## 2021-07-22 DIAGNOSIS — M54.6 PAIN IN THORACIC SPINE: Primary | ICD-10-CM

## 2021-07-23 ENCOUNTER — TELEPHONE (OUTPATIENT)
Dept: SPINE | Facility: CLINIC | Age: 41
End: 2021-07-23

## 2021-07-23 ENCOUNTER — HOSPITAL ENCOUNTER (OUTPATIENT)
Dept: RADIOLOGY | Facility: HOSPITAL | Age: 41
Discharge: HOME | End: 2021-07-23
Attending: PHYSICAL MEDICINE & REHABILITATION
Payer: COMMERCIAL

## 2021-07-23 DIAGNOSIS — M54.6 PAIN IN THORACIC SPINE: ICD-10-CM

## 2021-07-23 PROCEDURE — 72070 XR THORACIC SPINE AP LATERAL: ICD-10-PCS | Mod: 26,,, | Performed by: RADIOLOGY

## 2021-07-23 PROCEDURE — 72070 X-RAY EXAM THORAC SPINE 2VWS: CPT | Mod: 26,,, | Performed by: RADIOLOGY

## 2021-07-23 PROCEDURE — 72070 X-RAY EXAM THORAC SPINE 2VWS: CPT | Mod: TC,FY

## 2021-07-26 DIAGNOSIS — M51.36 DDD (DEGENERATIVE DISC DISEASE), LUMBAR: ICD-10-CM

## 2021-07-26 DIAGNOSIS — M54.16 LUMBAR RADICULOPATHY: ICD-10-CM

## 2021-07-26 RX ORDER — HYDROCODONE BITARTRATE AND ACETAMINOPHEN 7.5; 325 MG/1; MG/1
1 TABLET ORAL EVERY 6 HOURS PRN
Qty: 120 TABLET | Refills: 0 | Status: SHIPPED | OUTPATIENT
Start: 2021-07-27 | End: 2022-02-03 | Stop reason: SDUPTHER

## 2021-07-27 DIAGNOSIS — N32.81 OVERACTIVE BLADDER: ICD-10-CM

## 2021-07-27 RX ORDER — TOLTERODINE 2 MG/1
4 CAPSULE, EXTENDED RELEASE ORAL DAILY
Qty: 180 CAPSULE | Refills: 0 | Status: SHIPPED | OUTPATIENT
Start: 2021-07-27 | End: 2021-11-12 | Stop reason: SDUPTHER

## 2021-08-02 ENCOUNTER — TELEPHONE (OUTPATIENT)
Dept: SPINE | Facility: CLINIC | Age: 41
End: 2021-08-02

## 2021-08-02 ENCOUNTER — PATIENT MESSAGE (OUTPATIENT)
Dept: SPINE | Facility: CLINIC | Age: 41
End: 2021-08-02

## 2021-08-02 DIAGNOSIS — M54.2 CERVICALGIA: Primary | ICD-10-CM

## 2021-08-03 ENCOUNTER — HOSPITAL ENCOUNTER (OUTPATIENT)
Dept: RADIOLOGY | Facility: CLINIC | Age: 41
Discharge: HOME | End: 2021-08-03
Attending: PHYSICAL MEDICINE & REHABILITATION
Payer: COMMERCIAL

## 2021-08-03 ENCOUNTER — TELEPHONE (OUTPATIENT)
Dept: SPINE | Facility: CLINIC | Age: 41
End: 2021-08-03

## 2021-08-03 ENCOUNTER — PATIENT MESSAGE (OUTPATIENT)
Dept: SPINE | Facility: CLINIC | Age: 41
End: 2021-08-03

## 2021-08-03 DIAGNOSIS — M54.2 CERVICALGIA: ICD-10-CM

## 2021-08-03 PROCEDURE — 72040 X-RAY EXAM NECK SPINE 2-3 VW: CPT | Mod: 26,,, | Performed by: RADIOLOGY

## 2021-08-03 PROCEDURE — 72040 X-RAY EXAM NECK SPINE 2-3 VW: CPT | Mod: TC,FY,PO

## 2021-08-03 PROCEDURE — 72040 XR CERVICAL SPINE AP LATERAL: ICD-10-PCS | Mod: 26,,, | Performed by: RADIOLOGY

## 2021-08-04 ENCOUNTER — PATIENT MESSAGE (OUTPATIENT)
Dept: RHEUMATOLOGY | Facility: CLINIC | Age: 41
End: 2021-08-04

## 2021-08-06 ENCOUNTER — PATIENT OUTREACH (OUTPATIENT)
Dept: ADMINISTRATIVE | Facility: OTHER | Age: 41
End: 2021-08-06

## 2021-08-10 ENCOUNTER — OFFICE VISIT (OUTPATIENT)
Dept: PAIN MEDICINE | Facility: CLINIC | Age: 41
End: 2021-08-10
Payer: COMMERCIAL

## 2021-08-10 ENCOUNTER — PATIENT MESSAGE (OUTPATIENT)
Dept: RHEUMATOLOGY | Facility: CLINIC | Age: 41
End: 2021-08-10

## 2021-08-10 VITALS
HEIGHT: 63 IN | DIASTOLIC BLOOD PRESSURE: 77 MMHG | HEART RATE: 81 BPM | WEIGHT: 255.06 LBS | SYSTOLIC BLOOD PRESSURE: 121 MMHG | BODY MASS INDEX: 45.19 KG/M2

## 2021-08-10 DIAGNOSIS — M51.36 DDD (DEGENERATIVE DISC DISEASE), LUMBAR: Primary | ICD-10-CM

## 2021-08-10 DIAGNOSIS — M54.16 LUMBAR RADICULOPATHY: ICD-10-CM

## 2021-08-10 DIAGNOSIS — F11.90 CHRONIC, CONTINUOUS USE OF OPIOIDS: ICD-10-CM

## 2021-08-10 DIAGNOSIS — G40.909 SEIZURE DISORDER: ICD-10-CM

## 2021-08-10 DIAGNOSIS — M51.34 DDD (DEGENERATIVE DISC DISEASE), THORACIC: ICD-10-CM

## 2021-08-10 DIAGNOSIS — M50.30 DDD (DEGENERATIVE DISC DISEASE), CERVICAL: ICD-10-CM

## 2021-08-10 PROCEDURE — 3008F BODY MASS INDEX DOCD: CPT | Mod: CPTII,S$GLB,, | Performed by: ANESTHESIOLOGY

## 2021-08-10 PROCEDURE — 99214 OFFICE O/P EST MOD 30 MIN: CPT | Mod: S$GLB,,, | Performed by: ANESTHESIOLOGY

## 2021-08-10 PROCEDURE — 80307 DRUG TEST PRSMV CHEM ANLYZR: CPT | Performed by: ANESTHESIOLOGY

## 2021-08-10 PROCEDURE — 1159F MED LIST DOCD IN RCRD: CPT | Mod: CPTII,S$GLB,, | Performed by: ANESTHESIOLOGY

## 2021-08-10 PROCEDURE — 1125F PR PAIN SEVERITY QUANTIFIED, PAIN PRESENT: ICD-10-PCS | Mod: CPTII,S$GLB,, | Performed by: ANESTHESIOLOGY

## 2021-08-10 PROCEDURE — 3044F HG A1C LEVEL LT 7.0%: CPT | Mod: CPTII,S$GLB,, | Performed by: ANESTHESIOLOGY

## 2021-08-10 PROCEDURE — 1159F PR MEDICATION LIST DOCUMENTED IN MEDICAL RECORD: ICD-10-PCS | Mod: CPTII,S$GLB,, | Performed by: ANESTHESIOLOGY

## 2021-08-10 PROCEDURE — 3074F SYST BP LT 130 MM HG: CPT | Mod: CPTII,S$GLB,, | Performed by: ANESTHESIOLOGY

## 2021-08-10 PROCEDURE — 1125F AMNT PAIN NOTED PAIN PRSNT: CPT | Mod: CPTII,S$GLB,, | Performed by: ANESTHESIOLOGY

## 2021-08-10 PROCEDURE — 3078F PR MOST RECENT DIASTOLIC BLOOD PRESSURE < 80 MM HG: ICD-10-PCS | Mod: CPTII,S$GLB,, | Performed by: ANESTHESIOLOGY

## 2021-08-10 PROCEDURE — 3008F PR BODY MASS INDEX (BMI) DOCUMENTED: ICD-10-PCS | Mod: CPTII,S$GLB,, | Performed by: ANESTHESIOLOGY

## 2021-08-10 PROCEDURE — 99214 PR OFFICE/OUTPT VISIT, EST, LEVL IV, 30-39 MIN: ICD-10-PCS | Mod: S$GLB,,, | Performed by: ANESTHESIOLOGY

## 2021-08-10 PROCEDURE — 3074F PR MOST RECENT SYSTOLIC BLOOD PRESSURE < 130 MM HG: ICD-10-PCS | Mod: CPTII,S$GLB,, | Performed by: ANESTHESIOLOGY

## 2021-08-10 PROCEDURE — 99999 PR PBB SHADOW E&M-EST. PATIENT-LVL V: CPT | Mod: PBBFAC,,, | Performed by: ANESTHESIOLOGY

## 2021-08-10 PROCEDURE — 3078F DIAST BP <80 MM HG: CPT | Mod: CPTII,S$GLB,, | Performed by: ANESTHESIOLOGY

## 2021-08-10 PROCEDURE — 99999 PR PBB SHADOW E&M-EST. PATIENT-LVL V: ICD-10-PCS | Mod: PBBFAC,,, | Performed by: ANESTHESIOLOGY

## 2021-08-10 PROCEDURE — 3044F PR MOST RECENT HEMOGLOBIN A1C LEVEL <7.0%: ICD-10-PCS | Mod: CPTII,S$GLB,, | Performed by: ANESTHESIOLOGY

## 2021-08-10 RX ORDER — HYDROCODONE BITARTRATE AND ACETAMINOPHEN 7.5; 325 MG/1; MG/1
1 TABLET ORAL EVERY 6 HOURS PRN
Qty: 120 TABLET | Refills: 0 | Status: SHIPPED | OUTPATIENT
Start: 2021-08-25 | End: 2021-09-28 | Stop reason: SDUPTHER

## 2021-08-15 LAB
6MAM UR QL: NOT DETECTED
7AMINOCLONAZEPAM UR QL: NOT DETECTED
A-OH ALPRAZ UR QL: NOT DETECTED
ALPHA-OH-MIDAZOLAM: NOT DETECTED
ALPRAZ UR QL: NOT DETECTED
AMPHET UR QL SCN: NOT DETECTED
ANNOTATION COMMENT IMP: NORMAL
ANNOTATION COMMENT IMP: NORMAL
BARBITURATES UR QL: NOT DETECTED
BUPRENORPHINE UR QL: NOT DETECTED
BZE UR QL: NOT DETECTED
CARBOXYTHC UR QL: PRESENT
CARISOPRODOL UR QL: NOT DETECTED
CLONAZEPAM UR QL: NOT DETECTED
CODEINE UR QL: NOT DETECTED
CREAT UR-MCNC: 213.3 MG/DL (ref 20–400)
DIAZEPAM UR QL: NOT DETECTED
ETHYL GLUCURONIDE UR QL: NOT DETECTED
FENTANYL UR QL: NOT DETECTED
GABAPENTIN: PRESENT
HYDROCODONE UR QL: PRESENT
HYDROMORPHONE UR QL: PRESENT
LORAZEPAM UR QL: NOT DETECTED
MDA UR QL: NOT DETECTED
MDEA UR QL: NOT DETECTED
MDMA UR QL: NOT DETECTED
ME-PHENIDATE UR QL: NOT DETECTED
METHADONE UR QL: NOT DETECTED
METHAMPHET UR QL: NOT DETECTED
MIDAZOLAM UR QL SCN: NOT DETECTED
MORPHINE UR QL: NOT DETECTED
NALOXONE: NOT DETECTED
NORBUPRENORPHINE UR QL CFM: NOT DETECTED
NORDIAZEPAM UR QL: NOT DETECTED
NORFENTANYL UR QL: NOT DETECTED
NORHYDROCODONE UR QL CFM: PRESENT
NORMEPERIDINE UR QL CFM: NOT DETECTED
NOROXYCODONE UR QL CFM: NOT DETECTED
NOROXYMORPHONE UR QL SCN: NOT DETECTED
OXAZEPAM UR QL: NOT DETECTED
OXYCODONE UR QL: NOT DETECTED
OXYMORPHONE UR QL: NOT DETECTED
PATHOLOGY STUDY: NORMAL
PCP UR QL: NOT DETECTED
PHENTERMINE UR QL: NOT DETECTED
PREGABALIN: NOT DETECTED
SERVICE CMNT-IMP: NORMAL
TAPENTADOL UR QL SCN: NOT DETECTED
TAPENTADOL-O-SULF: NOT DETECTED
TEMAZEPAM UR QL: NOT DETECTED
TRAMADOL UR QL: NOT DETECTED
ZOLPIDEM METABOLITE: NOT DETECTED
ZOLPIDEM UR QL: NOT DETECTED

## 2021-08-25 ENCOUNTER — PATIENT MESSAGE (OUTPATIENT)
Dept: PULMONOLOGY | Facility: CLINIC | Age: 41
End: 2021-08-25

## 2021-09-03 ENCOUNTER — PATIENT MESSAGE (OUTPATIENT)
Dept: NEUROSURGERY | Facility: CLINIC | Age: 41
End: 2021-09-03

## 2021-09-17 ENCOUNTER — PATIENT OUTREACH (OUTPATIENT)
Dept: ADMINISTRATIVE | Facility: OTHER | Age: 41
End: 2021-09-17

## 2021-09-20 ENCOUNTER — OFFICE VISIT (OUTPATIENT)
Dept: NEUROLOGY | Facility: CLINIC | Age: 41
End: 2021-09-20
Payer: COMMERCIAL

## 2021-09-20 VITALS
DIASTOLIC BLOOD PRESSURE: 84 MMHG | WEIGHT: 258.38 LBS | SYSTOLIC BLOOD PRESSURE: 146 MMHG | RESPIRATION RATE: 16 BRPM | HEIGHT: 63 IN | BODY MASS INDEX: 45.78 KG/M2 | HEART RATE: 67 BPM

## 2021-09-20 DIAGNOSIS — G40.909 SEIZURE DISORDER: Primary | ICD-10-CM

## 2021-09-20 PROCEDURE — 99999 PR PBB SHADOW E&M-EST. PATIENT-LVL III: CPT | Mod: PBBFAC,,, | Performed by: PSYCHIATRY & NEUROLOGY

## 2021-09-20 PROCEDURE — 99205 PR OFFICE/OUTPT VISIT, NEW, LEVL V, 60-74 MIN: ICD-10-PCS | Mod: S$GLB,,, | Performed by: PSYCHIATRY & NEUROLOGY

## 2021-09-20 PROCEDURE — 3044F HG A1C LEVEL LT 7.0%: CPT | Mod: CPTII,S$GLB,, | Performed by: PSYCHIATRY & NEUROLOGY

## 2021-09-20 PROCEDURE — 3079F DIAST BP 80-89 MM HG: CPT | Mod: CPTII,S$GLB,, | Performed by: PSYCHIATRY & NEUROLOGY

## 2021-09-20 PROCEDURE — 1159F MED LIST DOCD IN RCRD: CPT | Mod: CPTII,S$GLB,, | Performed by: PSYCHIATRY & NEUROLOGY

## 2021-09-20 PROCEDURE — 99999 PR PBB SHADOW E&M-EST. PATIENT-LVL III: ICD-10-PCS | Mod: PBBFAC,,, | Performed by: PSYCHIATRY & NEUROLOGY

## 2021-09-20 PROCEDURE — 3077F PR MOST RECENT SYSTOLIC BLOOD PRESSURE >= 140 MM HG: ICD-10-PCS | Mod: CPTII,S$GLB,, | Performed by: PSYCHIATRY & NEUROLOGY

## 2021-09-20 PROCEDURE — 1160F RVW MEDS BY RX/DR IN RCRD: CPT | Mod: CPTII,S$GLB,, | Performed by: PSYCHIATRY & NEUROLOGY

## 2021-09-20 PROCEDURE — 3008F BODY MASS INDEX DOCD: CPT | Mod: CPTII,S$GLB,, | Performed by: PSYCHIATRY & NEUROLOGY

## 2021-09-20 PROCEDURE — 1159F PR MEDICATION LIST DOCUMENTED IN MEDICAL RECORD: ICD-10-PCS | Mod: CPTII,S$GLB,, | Performed by: PSYCHIATRY & NEUROLOGY

## 2021-09-20 PROCEDURE — 3077F SYST BP >= 140 MM HG: CPT | Mod: CPTII,S$GLB,, | Performed by: PSYCHIATRY & NEUROLOGY

## 2021-09-20 PROCEDURE — 3044F PR MOST RECENT HEMOGLOBIN A1C LEVEL <7.0%: ICD-10-PCS | Mod: CPTII,S$GLB,, | Performed by: PSYCHIATRY & NEUROLOGY

## 2021-09-20 PROCEDURE — 3079F PR MOST RECENT DIASTOLIC BLOOD PRESSURE 80-89 MM HG: ICD-10-PCS | Mod: CPTII,S$GLB,, | Performed by: PSYCHIATRY & NEUROLOGY

## 2021-09-20 PROCEDURE — 3008F PR BODY MASS INDEX (BMI) DOCUMENTED: ICD-10-PCS | Mod: CPTII,S$GLB,, | Performed by: PSYCHIATRY & NEUROLOGY

## 2021-09-20 PROCEDURE — 4010F PR ACE/ARB THEARPY RXD/TAKEN: ICD-10-PCS | Mod: CPTII,S$GLB,, | Performed by: PSYCHIATRY & NEUROLOGY

## 2021-09-20 PROCEDURE — 4010F ACE/ARB THERAPY RXD/TAKEN: CPT | Mod: CPTII,S$GLB,, | Performed by: PSYCHIATRY & NEUROLOGY

## 2021-09-20 PROCEDURE — 99205 OFFICE O/P NEW HI 60 MIN: CPT | Mod: S$GLB,,, | Performed by: PSYCHIATRY & NEUROLOGY

## 2021-09-20 PROCEDURE — 1160F PR REVIEW ALL MEDS BY PRESCRIBER/CLIN PHARMACIST DOCUMENTED: ICD-10-PCS | Mod: CPTII,S$GLB,, | Performed by: PSYCHIATRY & NEUROLOGY

## 2021-09-20 RX ORDER — LEVETIRACETAM 750 MG/1
1500 TABLET ORAL 2 TIMES DAILY
Qty: 360 TABLET | Refills: 3 | Status: SHIPPED | OUTPATIENT
Start: 2021-09-20 | End: 2022-05-09 | Stop reason: SDUPTHER

## 2021-09-20 RX ORDER — LAMOTRIGINE 25 MG/1
25 TABLET ORAL 2 TIMES DAILY
Qty: 180 TABLET | Refills: 3 | Status: SHIPPED | OUTPATIENT
Start: 2021-09-20 | End: 2022-05-09 | Stop reason: SDUPTHER

## 2021-09-20 RX ORDER — NORTRIPTYLINE HYDROCHLORIDE 25 MG/1
25 CAPSULE ORAL NIGHTLY
COMMUNITY
End: 2024-04-02

## 2021-09-20 RX ORDER — HYDROXYZINE HYDROCHLORIDE 25 MG/1
TABLET, FILM COATED ORAL
COMMUNITY
Start: 2021-08-17 | End: 2021-11-12 | Stop reason: SDUPTHER

## 2021-09-20 RX ORDER — GABAPENTIN 300 MG/1
CAPSULE ORAL
COMMUNITY
Start: 2021-09-07 | End: 2022-05-10

## 2021-09-20 RX ORDER — LAMOTRIGINE 150 MG/1
300 TABLET ORAL 2 TIMES DAILY
Qty: 360 TABLET | Refills: 3 | Status: SHIPPED | OUTPATIENT
Start: 2021-09-20 | End: 2022-05-09 | Stop reason: SDUPTHER

## 2021-09-20 RX ORDER — CANNABIDIOL 100 MG/ML
7 SOLUTION ORAL 2 TIMES DAILY
Qty: 480 ML | Refills: 5 | Status: SHIPPED | OUTPATIENT
Start: 2021-09-20 | End: 2022-05-17

## 2021-09-22 DIAGNOSIS — M51.36 DDD (DEGENERATIVE DISC DISEASE), LUMBAR: ICD-10-CM

## 2021-09-22 DIAGNOSIS — M54.16 LUMBAR RADICULOPATHY: ICD-10-CM

## 2021-09-22 DIAGNOSIS — M50.30 DDD (DEGENERATIVE DISC DISEASE), CERVICAL: ICD-10-CM

## 2021-09-22 DIAGNOSIS — M51.34 DDD (DEGENERATIVE DISC DISEASE), THORACIC: ICD-10-CM

## 2021-09-22 DIAGNOSIS — G40.909 SEIZURE DISORDER: ICD-10-CM

## 2021-09-28 ENCOUNTER — TELEPHONE (OUTPATIENT)
Dept: NEUROLOGY | Facility: CLINIC | Age: 41
End: 2021-09-28

## 2021-09-28 RX ORDER — HYDROCODONE BITARTRATE AND ACETAMINOPHEN 7.5; 325 MG/1; MG/1
1 TABLET ORAL EVERY 6 HOURS PRN
Qty: 120 TABLET | Refills: 0 | Status: SHIPPED | OUTPATIENT
Start: 2021-09-28 | End: 2021-09-29

## 2021-09-29 ENCOUNTER — TELEPHONE (OUTPATIENT)
Dept: FAMILY MEDICINE | Facility: CLINIC | Age: 41
End: 2021-09-29

## 2021-09-29 DIAGNOSIS — M51.34 DDD (DEGENERATIVE DISC DISEASE), THORACIC: ICD-10-CM

## 2021-09-29 DIAGNOSIS — M54.16 LUMBAR RADICULOPATHY: ICD-10-CM

## 2021-09-29 DIAGNOSIS — M51.36 DDD (DEGENERATIVE DISC DISEASE), LUMBAR: Primary | ICD-10-CM

## 2021-09-29 RX ORDER — HYDROCODONE BITARTRATE AND ACETAMINOPHEN 7.5; 325 MG/1; MG/1
1 TABLET ORAL EVERY 6 HOURS PRN
Qty: 120 TABLET | Refills: 0 | Status: SHIPPED | OUTPATIENT
Start: 2021-09-29 | End: 2021-12-01 | Stop reason: SDUPTHER

## 2021-10-01 ENCOUNTER — SPECIALTY PHARMACY (OUTPATIENT)
Dept: PHARMACY | Facility: CLINIC | Age: 41
End: 2021-10-01

## 2021-10-06 ENCOUNTER — PATIENT MESSAGE (OUTPATIENT)
Dept: PULMONOLOGY | Facility: CLINIC | Age: 41
End: 2021-10-06

## 2021-10-07 ENCOUNTER — PATIENT MESSAGE (OUTPATIENT)
Dept: ADMINISTRATIVE | Facility: HOSPITAL | Age: 41
End: 2021-10-07

## 2021-10-12 ENCOUNTER — TELEPHONE (OUTPATIENT)
Dept: NEUROLOGY | Facility: CLINIC | Age: 41
End: 2021-10-12

## 2021-10-12 ENCOUNTER — OFFICE VISIT (OUTPATIENT)
Dept: NEUROLOGY | Facility: CLINIC | Age: 41
End: 2021-10-12
Payer: COMMERCIAL

## 2021-10-12 VITALS
RESPIRATION RATE: 16 BRPM | DIASTOLIC BLOOD PRESSURE: 85 MMHG | HEIGHT: 63 IN | TEMPERATURE: 98 F | BODY MASS INDEX: 44.8 KG/M2 | HEART RATE: 61 BPM | SYSTOLIC BLOOD PRESSURE: 152 MMHG | WEIGHT: 252.88 LBS

## 2021-10-12 DIAGNOSIS — G40.909 SEIZURE DISORDER: Primary | ICD-10-CM

## 2021-10-12 PROCEDURE — 95976 PR ELEC ANALYSIS, IMPLT NEURO PULSE GEN, W/PRGRM, SMPL CRAN NERVE: ICD-10-PCS | Mod: S$GLB,,, | Performed by: PSYCHIATRY & NEUROLOGY

## 2021-10-12 PROCEDURE — 3077F SYST BP >= 140 MM HG: CPT | Mod: CPTII,S$GLB,, | Performed by: PSYCHIATRY & NEUROLOGY

## 2021-10-12 PROCEDURE — 3044F HG A1C LEVEL LT 7.0%: CPT | Mod: CPTII,S$GLB,, | Performed by: PSYCHIATRY & NEUROLOGY

## 2021-10-12 PROCEDURE — 3008F BODY MASS INDEX DOCD: CPT | Mod: CPTII,S$GLB,, | Performed by: PSYCHIATRY & NEUROLOGY

## 2021-10-12 PROCEDURE — 99499 UNLISTED E&M SERVICE: CPT | Mod: S$GLB,,, | Performed by: PSYCHIATRY & NEUROLOGY

## 2021-10-12 PROCEDURE — 3079F DIAST BP 80-89 MM HG: CPT | Mod: CPTII,S$GLB,, | Performed by: PSYCHIATRY & NEUROLOGY

## 2021-10-12 PROCEDURE — 3077F PR MOST RECENT SYSTOLIC BLOOD PRESSURE >= 140 MM HG: ICD-10-PCS | Mod: CPTII,S$GLB,, | Performed by: PSYCHIATRY & NEUROLOGY

## 2021-10-12 PROCEDURE — 1159F MED LIST DOCD IN RCRD: CPT | Mod: CPTII,S$GLB,, | Performed by: PSYCHIATRY & NEUROLOGY

## 2021-10-12 PROCEDURE — 3044F PR MOST RECENT HEMOGLOBIN A1C LEVEL <7.0%: ICD-10-PCS | Mod: CPTII,S$GLB,, | Performed by: PSYCHIATRY & NEUROLOGY

## 2021-10-12 PROCEDURE — 99999 PR PBB SHADOW E&M-EST. PATIENT-LVL III: ICD-10-PCS | Mod: PBBFAC,,, | Performed by: PSYCHIATRY & NEUROLOGY

## 2021-10-12 PROCEDURE — 1159F PR MEDICATION LIST DOCUMENTED IN MEDICAL RECORD: ICD-10-PCS | Mod: CPTII,S$GLB,, | Performed by: PSYCHIATRY & NEUROLOGY

## 2021-10-12 PROCEDURE — 99999 PR PBB SHADOW E&M-EST. PATIENT-LVL III: CPT | Mod: PBBFAC,,, | Performed by: PSYCHIATRY & NEUROLOGY

## 2021-10-12 PROCEDURE — 99499 NO LOS: ICD-10-PCS | Mod: S$GLB,,, | Performed by: PSYCHIATRY & NEUROLOGY

## 2021-10-12 PROCEDURE — 95976 ALYS SMPL CN NPGT PRGRMG: CPT | Mod: S$GLB,,, | Performed by: PSYCHIATRY & NEUROLOGY

## 2021-10-12 PROCEDURE — 4010F ACE/ARB THERAPY RXD/TAKEN: CPT | Mod: CPTII,S$GLB,, | Performed by: PSYCHIATRY & NEUROLOGY

## 2021-10-12 PROCEDURE — 3079F PR MOST RECENT DIASTOLIC BLOOD PRESSURE 80-89 MM HG: ICD-10-PCS | Mod: CPTII,S$GLB,, | Performed by: PSYCHIATRY & NEUROLOGY

## 2021-10-12 PROCEDURE — 4010F PR ACE/ARB THEARPY RXD/TAKEN: ICD-10-PCS | Mod: CPTII,S$GLB,, | Performed by: PSYCHIATRY & NEUROLOGY

## 2021-10-12 PROCEDURE — 3008F PR BODY MASS INDEX (BMI) DOCUMENTED: ICD-10-PCS | Mod: CPTII,S$GLB,, | Performed by: PSYCHIATRY & NEUROLOGY

## 2021-10-29 ENCOUNTER — TELEPHONE (OUTPATIENT)
Dept: PAIN MEDICINE | Facility: CLINIC | Age: 41
End: 2021-10-29
Payer: COMMERCIAL

## 2021-10-29 DIAGNOSIS — M50.30 DDD (DEGENERATIVE DISC DISEASE), CERVICAL: ICD-10-CM

## 2021-10-29 DIAGNOSIS — M51.36 DDD (DEGENERATIVE DISC DISEASE), LUMBAR: Primary | ICD-10-CM

## 2021-10-29 DIAGNOSIS — M54.16 LUMBAR RADICULOPATHY: ICD-10-CM

## 2021-10-29 DIAGNOSIS — M51.34 DDD (DEGENERATIVE DISC DISEASE), THORACIC: ICD-10-CM

## 2021-10-29 RX ORDER — HYDROCODONE BITARTRATE AND ACETAMINOPHEN 7.5; 325 MG/1; MG/1
1 TABLET ORAL EVERY 6 HOURS PRN
Qty: 120 TABLET | Refills: 0 | Status: SHIPPED | OUTPATIENT
Start: 2021-10-29 | End: 2021-10-29

## 2021-10-29 RX ORDER — HYDROCODONE BITARTRATE AND ACETAMINOPHEN 7.5; 325 MG/1; MG/1
1 TABLET ORAL EVERY 6 HOURS PRN
Qty: 120 TABLET | Refills: 0 | Status: SHIPPED | OUTPATIENT
Start: 2021-10-29 | End: 2021-12-30 | Stop reason: SDUPTHER

## 2021-11-01 ENCOUNTER — PATIENT OUTREACH (OUTPATIENT)
Dept: ADMINISTRATIVE | Facility: OTHER | Age: 41
End: 2021-11-01
Payer: COMMERCIAL

## 2021-11-12 ENCOUNTER — OFFICE VISIT (OUTPATIENT)
Dept: FAMILY MEDICINE | Facility: CLINIC | Age: 41
End: 2021-11-12
Payer: COMMERCIAL

## 2021-11-12 VITALS
BODY MASS INDEX: 45.32 KG/M2 | HEIGHT: 63 IN | TEMPERATURE: 99 F | RESPIRATION RATE: 16 BRPM | OXYGEN SATURATION: 96 % | DIASTOLIC BLOOD PRESSURE: 62 MMHG | WEIGHT: 255.75 LBS | HEART RATE: 78 BPM | SYSTOLIC BLOOD PRESSURE: 132 MMHG

## 2021-11-12 DIAGNOSIS — E78.5 HYPERLIPIDEMIA, UNSPECIFIED HYPERLIPIDEMIA TYPE: ICD-10-CM

## 2021-11-12 DIAGNOSIS — M19.90 ARTHRITIS: ICD-10-CM

## 2021-11-12 DIAGNOSIS — I10 HYPERTENSION, UNSPECIFIED TYPE: Primary | ICD-10-CM

## 2021-11-12 DIAGNOSIS — E66.01 MORBID OBESITY: ICD-10-CM

## 2021-11-12 DIAGNOSIS — N32.81 OVERACTIVE BLADDER: ICD-10-CM

## 2021-11-12 DIAGNOSIS — J30.89 NON-SEASONAL ALLERGIC RHINITIS, UNSPECIFIED TRIGGER: ICD-10-CM

## 2021-11-12 DIAGNOSIS — F41.1 GAD (GENERALIZED ANXIETY DISORDER): ICD-10-CM

## 2021-11-12 DIAGNOSIS — K21.9 GASTROESOPHAGEAL REFLUX DISEASE, UNSPECIFIED WHETHER ESOPHAGITIS PRESENT: ICD-10-CM

## 2021-11-12 DIAGNOSIS — J45.20 MILD INTERMITTENT ASTHMA WITHOUT COMPLICATION: ICD-10-CM

## 2021-11-12 DIAGNOSIS — G40.909 SEIZURE DISORDER: ICD-10-CM

## 2021-11-12 DIAGNOSIS — F17.299 OTHER TOBACCO PRODUCT NICOTINE DEPENDENCE WITH NICOTINE-INDUCED DISORDER: ICD-10-CM

## 2021-11-12 PROCEDURE — 3008F PR BODY MASS INDEX (BMI) DOCUMENTED: ICD-10-PCS | Mod: CPTII,S$GLB,, | Performed by: STUDENT IN AN ORGANIZED HEALTH CARE EDUCATION/TRAINING PROGRAM

## 2021-11-12 PROCEDURE — 3008F BODY MASS INDEX DOCD: CPT | Mod: CPTII,S$GLB,, | Performed by: STUDENT IN AN ORGANIZED HEALTH CARE EDUCATION/TRAINING PROGRAM

## 2021-11-12 PROCEDURE — 99999 PR PBB SHADOW E&M-EST. PATIENT-LVL V: ICD-10-PCS | Mod: PBBFAC,,, | Performed by: STUDENT IN AN ORGANIZED HEALTH CARE EDUCATION/TRAINING PROGRAM

## 2021-11-12 PROCEDURE — 4010F PR ACE/ARB THEARPY RXD/TAKEN: ICD-10-PCS | Mod: CPTII,S$GLB,, | Performed by: STUDENT IN AN ORGANIZED HEALTH CARE EDUCATION/TRAINING PROGRAM

## 2021-11-12 PROCEDURE — 99214 OFFICE O/P EST MOD 30 MIN: CPT | Mod: S$GLB,,, | Performed by: STUDENT IN AN ORGANIZED HEALTH CARE EDUCATION/TRAINING PROGRAM

## 2021-11-12 PROCEDURE — 3075F SYST BP GE 130 - 139MM HG: CPT | Mod: CPTII,S$GLB,, | Performed by: STUDENT IN AN ORGANIZED HEALTH CARE EDUCATION/TRAINING PROGRAM

## 2021-11-12 PROCEDURE — 4010F ACE/ARB THERAPY RXD/TAKEN: CPT | Mod: CPTII,S$GLB,, | Performed by: STUDENT IN AN ORGANIZED HEALTH CARE EDUCATION/TRAINING PROGRAM

## 2021-11-12 PROCEDURE — 3075F PR MOST RECENT SYSTOLIC BLOOD PRESS GE 130-139MM HG: ICD-10-PCS | Mod: CPTII,S$GLB,, | Performed by: STUDENT IN AN ORGANIZED HEALTH CARE EDUCATION/TRAINING PROGRAM

## 2021-11-12 PROCEDURE — 99214 PR OFFICE/OUTPT VISIT, EST, LEVL IV, 30-39 MIN: ICD-10-PCS | Mod: S$GLB,,, | Performed by: STUDENT IN AN ORGANIZED HEALTH CARE EDUCATION/TRAINING PROGRAM

## 2021-11-12 PROCEDURE — 3078F PR MOST RECENT DIASTOLIC BLOOD PRESSURE < 80 MM HG: ICD-10-PCS | Mod: CPTII,S$GLB,, | Performed by: STUDENT IN AN ORGANIZED HEALTH CARE EDUCATION/TRAINING PROGRAM

## 2021-11-12 PROCEDURE — 1159F PR MEDICATION LIST DOCUMENTED IN MEDICAL RECORD: ICD-10-PCS | Mod: CPTII,S$GLB,, | Performed by: STUDENT IN AN ORGANIZED HEALTH CARE EDUCATION/TRAINING PROGRAM

## 2021-11-12 PROCEDURE — 3044F PR MOST RECENT HEMOGLOBIN A1C LEVEL <7.0%: ICD-10-PCS | Mod: CPTII,S$GLB,, | Performed by: STUDENT IN AN ORGANIZED HEALTH CARE EDUCATION/TRAINING PROGRAM

## 2021-11-12 PROCEDURE — 99499 UNLISTED E&M SERVICE: CPT | Mod: S$GLB,,, | Performed by: STUDENT IN AN ORGANIZED HEALTH CARE EDUCATION/TRAINING PROGRAM

## 2021-11-12 PROCEDURE — 99499 RISK ADDL DX/OHS AUDIT: ICD-10-PCS | Mod: S$GLB,,, | Performed by: STUDENT IN AN ORGANIZED HEALTH CARE EDUCATION/TRAINING PROGRAM

## 2021-11-12 PROCEDURE — 99999 PR PBB SHADOW E&M-EST. PATIENT-LVL V: CPT | Mod: PBBFAC,,, | Performed by: STUDENT IN AN ORGANIZED HEALTH CARE EDUCATION/TRAINING PROGRAM

## 2021-11-12 PROCEDURE — 3044F HG A1C LEVEL LT 7.0%: CPT | Mod: CPTII,S$GLB,, | Performed by: STUDENT IN AN ORGANIZED HEALTH CARE EDUCATION/TRAINING PROGRAM

## 2021-11-12 PROCEDURE — 1159F MED LIST DOCD IN RCRD: CPT | Mod: CPTII,S$GLB,, | Performed by: STUDENT IN AN ORGANIZED HEALTH CARE EDUCATION/TRAINING PROGRAM

## 2021-11-12 PROCEDURE — 3078F DIAST BP <80 MM HG: CPT | Mod: CPTII,S$GLB,, | Performed by: STUDENT IN AN ORGANIZED HEALTH CARE EDUCATION/TRAINING PROGRAM

## 2021-11-12 RX ORDER — METOPROLOL SUCCINATE 50 MG/1
50 TABLET, EXTENDED RELEASE ORAL DAILY
Qty: 90 TABLET | Refills: 1 | Status: SHIPPED | OUTPATIENT
Start: 2021-11-12 | End: 2022-02-14 | Stop reason: SDUPTHER

## 2021-11-12 RX ORDER — HYDROXYZINE HYDROCHLORIDE 25 MG/1
25 TABLET, FILM COATED ORAL
Qty: 90 TABLET | Refills: 1 | Status: SHIPPED | OUTPATIENT
Start: 2021-11-12 | End: 2022-02-14 | Stop reason: SDUPTHER

## 2021-11-12 RX ORDER — MONTELUKAST SODIUM 10 MG/1
10 TABLET ORAL NIGHTLY
Qty: 90 TABLET | Refills: 1 | Status: SHIPPED | OUTPATIENT
Start: 2021-11-12 | End: 2022-02-14 | Stop reason: SDUPTHER

## 2021-11-12 RX ORDER — OMEPRAZOLE 40 MG/1
40 CAPSULE, DELAYED RELEASE ORAL EVERY MORNING
Qty: 90 CAPSULE | Refills: 2 | Status: SHIPPED | OUTPATIENT
Start: 2021-11-12 | End: 2022-05-09 | Stop reason: SDUPTHER

## 2021-11-12 RX ORDER — ROSUVASTATIN CALCIUM 20 MG/1
20 TABLET, COATED ORAL DAILY
Qty: 90 TABLET | Refills: 3 | Status: SHIPPED | OUTPATIENT
Start: 2021-11-12 | End: 2022-05-09 | Stop reason: SDUPTHER

## 2021-11-12 RX ORDER — TOLTERODINE 2 MG/1
4 CAPSULE, EXTENDED RELEASE ORAL DAILY
Qty: 180 CAPSULE | Refills: 0 | Status: SHIPPED | OUTPATIENT
Start: 2021-11-12 | End: 2022-05-09 | Stop reason: SDUPTHER

## 2021-11-12 RX ORDER — LOSARTAN POTASSIUM 50 MG/1
50 TABLET ORAL NIGHTLY
Qty: 90 TABLET | Refills: 1 | Status: SHIPPED | OUTPATIENT
Start: 2021-11-12 | End: 2022-02-14 | Stop reason: SDUPTHER

## 2021-11-15 ENCOUNTER — TELEPHONE (OUTPATIENT)
Dept: RHEUMATOLOGY | Facility: CLINIC | Age: 41
End: 2021-11-15
Payer: COMMERCIAL

## 2021-11-29 ENCOUNTER — HOSPITAL ENCOUNTER (OUTPATIENT)
Dept: RADIOLOGY | Facility: HOSPITAL | Age: 41
Discharge: HOME | End: 2021-11-29
Attending: PHYSICIAN ASSISTANT
Payer: COMMERCIAL

## 2021-11-29 ENCOUNTER — OFFICE VISIT (OUTPATIENT)
Dept: FAMILY MEDICINE | Facility: CLINIC | Age: 41
End: 2021-11-29
Payer: COMMERCIAL

## 2021-11-29 ENCOUNTER — PATIENT MESSAGE (OUTPATIENT)
Dept: FAMILY MEDICINE | Facility: CLINIC | Age: 41
End: 2021-11-29

## 2021-11-29 VITALS
OXYGEN SATURATION: 98 % | TEMPERATURE: 99 F | BODY MASS INDEX: 47.77 KG/M2 | DIASTOLIC BLOOD PRESSURE: 74 MMHG | WEIGHT: 269.63 LBS | HEIGHT: 63 IN | SYSTOLIC BLOOD PRESSURE: 138 MMHG | HEART RATE: 98 BPM

## 2021-11-29 DIAGNOSIS — R63.5 WEIGHT GAIN: ICD-10-CM

## 2021-11-29 DIAGNOSIS — M51.34 DDD (DEGENERATIVE DISC DISEASE), THORACIC: ICD-10-CM

## 2021-11-29 DIAGNOSIS — M51.36 DDD (DEGENERATIVE DISC DISEASE), LUMBAR: ICD-10-CM

## 2021-11-29 DIAGNOSIS — R60.0 PERIPHERAL EDEMA: Primary | ICD-10-CM

## 2021-11-29 DIAGNOSIS — M54.16 LUMBAR RADICULOPATHY: ICD-10-CM

## 2021-11-29 DIAGNOSIS — R60.0 PERIPHERAL EDEMA: ICD-10-CM

## 2021-11-29 DIAGNOSIS — M50.30 DDD (DEGENERATIVE DISC DISEASE), CERVICAL: ICD-10-CM

## 2021-11-29 PROCEDURE — 99999 PR PBB SHADOW E&M-EST. PATIENT-LVL V: CPT | Mod: PBBFAC,,, | Performed by: PHYSICIAN ASSISTANT

## 2021-11-29 PROCEDURE — 4010F PR ACE/ARB THEARPY RXD/TAKEN: ICD-10-PCS | Mod: CPTII,S$GLB,, | Performed by: PHYSICIAN ASSISTANT

## 2021-11-29 PROCEDURE — 99999 PR PBB SHADOW E&M-EST. PATIENT-LVL V: ICD-10-PCS | Mod: PBBFAC,,, | Performed by: PHYSICIAN ASSISTANT

## 2021-11-29 PROCEDURE — 99214 PR OFFICE/OUTPT VISIT, EST, LEVL IV, 30-39 MIN: ICD-10-PCS | Mod: S$GLB,,, | Performed by: PHYSICIAN ASSISTANT

## 2021-11-29 PROCEDURE — 4010F ACE/ARB THERAPY RXD/TAKEN: CPT | Mod: CPTII,S$GLB,, | Performed by: PHYSICIAN ASSISTANT

## 2021-11-29 PROCEDURE — 99214 OFFICE O/P EST MOD 30 MIN: CPT | Mod: S$GLB,,, | Performed by: PHYSICIAN ASSISTANT

## 2021-11-29 PROCEDURE — 93970 EXTREMITY STUDY: CPT | Mod: TC

## 2021-11-29 RX ORDER — HYDROCODONE BITARTRATE AND ACETAMINOPHEN 7.5; 325 MG/1; MG/1
1 TABLET ORAL EVERY 6 HOURS PRN
Qty: 120 TABLET | Refills: 0 | OUTPATIENT
Start: 2021-11-29

## 2021-11-30 ENCOUNTER — PATIENT MESSAGE (OUTPATIENT)
Dept: FAMILY MEDICINE | Facility: CLINIC | Age: 41
End: 2021-11-30
Payer: COMMERCIAL

## 2021-11-30 ENCOUNTER — TELEPHONE (OUTPATIENT)
Dept: FAMILY MEDICINE | Facility: CLINIC | Age: 41
End: 2021-11-30
Payer: COMMERCIAL

## 2021-11-30 DIAGNOSIS — R60.0 PERIPHERAL EDEMA: Primary | ICD-10-CM

## 2021-12-01 ENCOUNTER — OFFICE VISIT (OUTPATIENT)
Dept: PAIN MEDICINE | Facility: CLINIC | Age: 41
End: 2021-12-01
Payer: COMMERCIAL

## 2021-12-01 VITALS
BODY MASS INDEX: 47.66 KG/M2 | HEART RATE: 77 BPM | SYSTOLIC BLOOD PRESSURE: 120 MMHG | DIASTOLIC BLOOD PRESSURE: 75 MMHG | HEIGHT: 63 IN | WEIGHT: 269 LBS

## 2021-12-01 DIAGNOSIS — M51.36 DDD (DEGENERATIVE DISC DISEASE), LUMBAR: ICD-10-CM

## 2021-12-01 DIAGNOSIS — M51.34 DDD (DEGENERATIVE DISC DISEASE), THORACIC: ICD-10-CM

## 2021-12-01 DIAGNOSIS — M50.30 DDD (DEGENERATIVE DISC DISEASE), CERVICAL: ICD-10-CM

## 2021-12-01 DIAGNOSIS — M51.36 DDD (DEGENERATIVE DISC DISEASE), LUMBAR: Primary | ICD-10-CM

## 2021-12-01 DIAGNOSIS — M54.16 LUMBAR RADICULOPATHY: ICD-10-CM

## 2021-12-01 PROCEDURE — 99999 PR PBB SHADOW E&M-EST. PATIENT-LVL V: CPT | Mod: PBBFAC,,, | Performed by: PHYSICIAN ASSISTANT

## 2021-12-01 PROCEDURE — 99214 PR OFFICE/OUTPT VISIT, EST, LEVL IV, 30-39 MIN: ICD-10-PCS | Mod: S$GLB,,, | Performed by: PHYSICIAN ASSISTANT

## 2021-12-01 PROCEDURE — 99999 PR PBB SHADOW E&M-EST. PATIENT-LVL V: ICD-10-PCS | Mod: PBBFAC,,, | Performed by: PHYSICIAN ASSISTANT

## 2021-12-01 PROCEDURE — 4010F PR ACE/ARB THEARPY RXD/TAKEN: ICD-10-PCS | Mod: CPTII,S$GLB,, | Performed by: PHYSICIAN ASSISTANT

## 2021-12-01 PROCEDURE — 99214 OFFICE O/P EST MOD 30 MIN: CPT | Mod: S$GLB,,, | Performed by: PHYSICIAN ASSISTANT

## 2021-12-01 PROCEDURE — 4010F ACE/ARB THERAPY RXD/TAKEN: CPT | Mod: CPTII,S$GLB,, | Performed by: PHYSICIAN ASSISTANT

## 2021-12-01 RX ORDER — HYDROCODONE BITARTRATE AND ACETAMINOPHEN 7.5; 325 MG/1; MG/1
1 TABLET ORAL EVERY 6 HOURS PRN
Qty: 120 TABLET | Refills: 0 | Status: SHIPPED | OUTPATIENT
Start: 2021-12-01 | End: 2021-12-30

## 2021-12-06 ENCOUNTER — TELEPHONE (OUTPATIENT)
Dept: FAMILY MEDICINE | Facility: CLINIC | Age: 41
End: 2021-12-06
Payer: COMMERCIAL

## 2021-12-09 ENCOUNTER — PATIENT OUTREACH (OUTPATIENT)
Dept: ADMINISTRATIVE | Facility: HOSPITAL | Age: 41
End: 2021-12-09
Payer: COMMERCIAL

## 2021-12-13 ENCOUNTER — OFFICE VISIT (OUTPATIENT)
Dept: NEUROLOGY | Facility: CLINIC | Age: 41
End: 2021-12-13
Payer: COMMERCIAL

## 2021-12-13 ENCOUNTER — PATIENT OUTREACH (OUTPATIENT)
Dept: ADMINISTRATIVE | Facility: OTHER | Age: 41
End: 2021-12-13
Payer: COMMERCIAL

## 2021-12-13 VITALS
SYSTOLIC BLOOD PRESSURE: 129 MMHG | HEART RATE: 72 BPM | BODY MASS INDEX: 45.99 KG/M2 | WEIGHT: 259.56 LBS | DIASTOLIC BLOOD PRESSURE: 69 MMHG | HEIGHT: 63 IN

## 2021-12-13 DIAGNOSIS — Z96.89 S/P PLACEMENT OF VNS (VAGUS NERVE STIMULATION) DEVICE: ICD-10-CM

## 2021-12-13 DIAGNOSIS — G40.909 SEIZURE DISORDER: Primary | ICD-10-CM

## 2021-12-13 PROCEDURE — 99999 PR PBB SHADOW E&M-EST. PATIENT-LVL V: CPT | Mod: PBBFAC,,, | Performed by: PSYCHIATRY & NEUROLOGY

## 2021-12-13 PROCEDURE — 4010F ACE/ARB THERAPY RXD/TAKEN: CPT | Mod: CPTII,S$GLB,, | Performed by: PSYCHIATRY & NEUROLOGY

## 2021-12-13 PROCEDURE — 99499 UNLISTED E&M SERVICE: CPT | Mod: S$GLB,,, | Performed by: PSYCHIATRY & NEUROLOGY

## 2021-12-13 PROCEDURE — 99499 NO LOS: ICD-10-PCS | Mod: S$GLB,,, | Performed by: PSYCHIATRY & NEUROLOGY

## 2021-12-13 PROCEDURE — 4010F PR ACE/ARB THEARPY RXD/TAKEN: ICD-10-PCS | Mod: CPTII,S$GLB,, | Performed by: PSYCHIATRY & NEUROLOGY

## 2021-12-13 PROCEDURE — 95977 PR ELEC ANALYSIS, IMPLT NEURO PULSE GEN, W/PRGRM, CMPLX CRAN NERVE: ICD-10-PCS | Mod: S$GLB,,, | Performed by: PSYCHIATRY & NEUROLOGY

## 2021-12-13 PROCEDURE — 95977 ALYS CPLX CN NPGT PRGRMG: CPT | Mod: S$GLB,,, | Performed by: PSYCHIATRY & NEUROLOGY

## 2021-12-13 PROCEDURE — 99999 PR PBB SHADOW E&M-EST. PATIENT-LVL V: ICD-10-PCS | Mod: PBBFAC,,, | Performed by: PSYCHIATRY & NEUROLOGY

## 2021-12-15 ENCOUNTER — TELEPHONE (OUTPATIENT)
Dept: FAMILY MEDICINE | Facility: CLINIC | Age: 41
End: 2021-12-15
Payer: COMMERCIAL

## 2021-12-30 ENCOUNTER — OFFICE VISIT (OUTPATIENT)
Dept: PAIN MEDICINE | Facility: CLINIC | Age: 41
End: 2021-12-30
Payer: COMMERCIAL

## 2021-12-30 VITALS
HEIGHT: 63 IN | SYSTOLIC BLOOD PRESSURE: 120 MMHG | DIASTOLIC BLOOD PRESSURE: 72 MMHG | HEART RATE: 83 BPM | BODY MASS INDEX: 45.89 KG/M2 | WEIGHT: 259 LBS

## 2021-12-30 DIAGNOSIS — M51.36 DDD (DEGENERATIVE DISC DISEASE), LUMBAR: ICD-10-CM

## 2021-12-30 DIAGNOSIS — M50.30 DDD (DEGENERATIVE DISC DISEASE), CERVICAL: ICD-10-CM

## 2021-12-30 DIAGNOSIS — M51.34 DDD (DEGENERATIVE DISC DISEASE), THORACIC: ICD-10-CM

## 2021-12-30 DIAGNOSIS — G89.4 CHRONIC PAIN DISORDER: Primary | ICD-10-CM

## 2021-12-30 DIAGNOSIS — M54.16 LUMBAR RADICULOPATHY: ICD-10-CM

## 2021-12-30 DIAGNOSIS — F11.90 CHRONIC, CONTINUOUS USE OF OPIOIDS: Primary | ICD-10-CM

## 2021-12-30 PROCEDURE — 99214 PR OFFICE/OUTPT VISIT, EST, LEVL IV, 30-39 MIN: ICD-10-PCS | Mod: S$GLB,,, | Performed by: PHYSICIAN ASSISTANT

## 2021-12-30 PROCEDURE — 4010F ACE/ARB THERAPY RXD/TAKEN: CPT | Mod: CPTII,S$GLB,, | Performed by: PHYSICIAN ASSISTANT

## 2021-12-30 PROCEDURE — 1159F MED LIST DOCD IN RCRD: CPT | Mod: CPTII,S$GLB,, | Performed by: PHYSICIAN ASSISTANT

## 2021-12-30 PROCEDURE — 3078F DIAST BP <80 MM HG: CPT | Mod: CPTII,S$GLB,, | Performed by: PHYSICIAN ASSISTANT

## 2021-12-30 PROCEDURE — 4010F PR ACE/ARB THEARPY RXD/TAKEN: ICD-10-PCS | Mod: CPTII,S$GLB,, | Performed by: PHYSICIAN ASSISTANT

## 2021-12-30 PROCEDURE — 3008F PR BODY MASS INDEX (BMI) DOCUMENTED: ICD-10-PCS | Mod: CPTII,S$GLB,, | Performed by: PHYSICIAN ASSISTANT

## 2021-12-30 PROCEDURE — 99999 PR PBB SHADOW E&M-EST. PATIENT-LVL V: ICD-10-PCS | Mod: PBBFAC,,, | Performed by: PHYSICIAN ASSISTANT

## 2021-12-30 PROCEDURE — 3008F BODY MASS INDEX DOCD: CPT | Mod: CPTII,S$GLB,, | Performed by: PHYSICIAN ASSISTANT

## 2021-12-30 PROCEDURE — 3078F PR MOST RECENT DIASTOLIC BLOOD PRESSURE < 80 MM HG: ICD-10-PCS | Mod: CPTII,S$GLB,, | Performed by: PHYSICIAN ASSISTANT

## 2021-12-30 PROCEDURE — 99999 PR PBB SHADOW E&M-EST. PATIENT-LVL V: CPT | Mod: PBBFAC,,, | Performed by: PHYSICIAN ASSISTANT

## 2021-12-30 PROCEDURE — 3044F PR MOST RECENT HEMOGLOBIN A1C LEVEL <7.0%: ICD-10-PCS | Mod: CPTII,S$GLB,, | Performed by: PHYSICIAN ASSISTANT

## 2021-12-30 PROCEDURE — 3044F HG A1C LEVEL LT 7.0%: CPT | Mod: CPTII,S$GLB,, | Performed by: PHYSICIAN ASSISTANT

## 2021-12-30 PROCEDURE — 80307 DRUG TEST PRSMV CHEM ANLYZR: CPT | Performed by: PHYSICIAN ASSISTANT

## 2021-12-30 PROCEDURE — 3074F SYST BP LT 130 MM HG: CPT | Mod: CPTII,S$GLB,, | Performed by: PHYSICIAN ASSISTANT

## 2021-12-30 PROCEDURE — 3074F PR MOST RECENT SYSTOLIC BLOOD PRESSURE < 130 MM HG: ICD-10-PCS | Mod: CPTII,S$GLB,, | Performed by: PHYSICIAN ASSISTANT

## 2021-12-30 PROCEDURE — 1159F PR MEDICATION LIST DOCUMENTED IN MEDICAL RECORD: ICD-10-PCS | Mod: CPTII,S$GLB,, | Performed by: PHYSICIAN ASSISTANT

## 2021-12-30 PROCEDURE — 99214 OFFICE O/P EST MOD 30 MIN: CPT | Mod: S$GLB,,, | Performed by: PHYSICIAN ASSISTANT

## 2021-12-30 RX ORDER — HYDROCODONE BITARTRATE AND ACETAMINOPHEN 7.5; 325 MG/1; MG/1
1 TABLET ORAL EVERY 6 HOURS PRN
Qty: 120 TABLET | Refills: 0 | Status: SHIPPED | OUTPATIENT
Start: 2021-12-31 | End: 2022-01-29

## 2022-01-02 PROBLEM — E78.2 MIXED HYPERLIPIDEMIA: Status: ACTIVE | Noted: 2022-01-02

## 2022-01-06 LAB
6MAM UR QL: NOT DETECTED
7AMINOCLONAZEPAM UR QL: NOT DETECTED
A-OH ALPRAZ UR QL: NOT DETECTED
ALPHA-OH-MIDAZOLAM: NOT DETECTED
ALPRAZ UR QL: NOT DETECTED
AMPHET UR QL SCN: NOT DETECTED
ANNOTATION COMMENT IMP: NORMAL
ANNOTATION COMMENT IMP: NORMAL
BARBITURATES UR QL: NOT DETECTED
BUPRENORPHINE UR QL: NOT DETECTED
BZE UR QL: NOT DETECTED
CARBOXYTHC UR QL: PRESENT
CARISOPRODOL UR QL: NOT DETECTED
CLONAZEPAM UR QL: NOT DETECTED
CODEINE UR QL: NOT DETECTED
CREAT UR-MCNC: 184 MG/DL (ref 20–400)
DIAZEPAM UR QL: NOT DETECTED
ETHYL GLUCURONIDE UR QL: PRESENT
FENTANYL UR QL: NOT DETECTED
GABAPENTIN: PRESENT
HYDROCODONE UR QL: NOT DETECTED
HYDROMORPHONE UR QL: NOT DETECTED
LORAZEPAM UR QL: NOT DETECTED
MDA UR QL: NOT DETECTED
MDEA UR QL: NOT DETECTED
MDMA UR QL: NOT DETECTED
ME-PHENIDATE UR QL: NOT DETECTED
METHADONE UR QL: NOT DETECTED
METHAMPHET UR QL: NOT DETECTED
MIDAZOLAM UR QL SCN: NOT DETECTED
MORPHINE UR QL: NOT DETECTED
NALOXONE: NOT DETECTED
NORBUPRENORPHINE UR QL CFM: NOT DETECTED
NORDIAZEPAM UR QL: NOT DETECTED
NORFENTANYL UR QL: NOT DETECTED
NORHYDROCODONE UR QL CFM: NOT DETECTED
NORMEPERIDINE UR QL CFM: NOT DETECTED
NOROXYCODONE UR QL CFM: NOT DETECTED
NOROXYMORPHONE UR QL SCN: NOT DETECTED
OXAZEPAM UR QL: NOT DETECTED
OXYCODONE UR QL: NOT DETECTED
OXYMORPHONE UR QL: NOT DETECTED
PATHOLOGY STUDY: NORMAL
PCP UR QL: NOT DETECTED
PHENTERMINE UR QL: NOT DETECTED
PREGABALIN: NOT DETECTED
SERVICE CMNT-IMP: NORMAL
TAPENTADOL UR QL SCN: NOT DETECTED
TAPENTADOL UR QL SCN: NOT DETECTED
TEMAZEPAM UR QL: NOT DETECTED
TRAMADOL UR QL: NOT DETECTED
ZOLPIDEM METABOLITE: NOT DETECTED
ZOLPIDEM UR QL: NOT DETECTED

## 2022-01-25 ENCOUNTER — PATIENT OUTREACH (OUTPATIENT)
Dept: ADMINISTRATIVE | Facility: OTHER | Age: 42
End: 2022-01-25
Payer: COMMERCIAL

## 2022-01-25 NOTE — PROGRESS NOTES
Chart was reviewed for overdue [facility]active [facility] Encounters (AYDIN)  topics  Updates were requested from care everywhere  Health Maintenance has been updated  LINKS immunization registry triggered

## 2022-02-03 ENCOUNTER — OFFICE VISIT (OUTPATIENT)
Dept: PAIN MEDICINE | Facility: CLINIC | Age: 42
End: 2022-02-03
Payer: COMMERCIAL

## 2022-02-03 VITALS
BODY MASS INDEX: 45.89 KG/M2 | HEART RATE: 83 BPM | SYSTOLIC BLOOD PRESSURE: 124 MMHG | WEIGHT: 259 LBS | HEIGHT: 63 IN | DIASTOLIC BLOOD PRESSURE: 77 MMHG

## 2022-02-03 DIAGNOSIS — M54.16 LUMBAR RADICULOPATHY: ICD-10-CM

## 2022-02-03 DIAGNOSIS — M50.30 DDD (DEGENERATIVE DISC DISEASE), CERVICAL: ICD-10-CM

## 2022-02-03 DIAGNOSIS — M51.36 DDD (DEGENERATIVE DISC DISEASE), LUMBAR: ICD-10-CM

## 2022-02-03 DIAGNOSIS — M51.36 DDD (DEGENERATIVE DISC DISEASE), LUMBAR: Primary | ICD-10-CM

## 2022-02-03 PROCEDURE — 3008F BODY MASS INDEX DOCD: CPT | Mod: HCNC,CPTII,S$GLB, | Performed by: PHYSICIAN ASSISTANT

## 2022-02-03 PROCEDURE — 3078F DIAST BP <80 MM HG: CPT | Mod: HCNC,CPTII,S$GLB, | Performed by: PHYSICIAN ASSISTANT

## 2022-02-03 PROCEDURE — 1159F PR MEDICATION LIST DOCUMENTED IN MEDICAL RECORD: ICD-10-PCS | Mod: HCNC,CPTII,S$GLB, | Performed by: PHYSICIAN ASSISTANT

## 2022-02-03 PROCEDURE — 99214 OFFICE O/P EST MOD 30 MIN: CPT | Mod: HCNC,S$GLB,, | Performed by: PHYSICIAN ASSISTANT

## 2022-02-03 PROCEDURE — 3078F PR MOST RECENT DIASTOLIC BLOOD PRESSURE < 80 MM HG: ICD-10-PCS | Mod: HCNC,CPTII,S$GLB, | Performed by: PHYSICIAN ASSISTANT

## 2022-02-03 PROCEDURE — 99214 PR OFFICE/OUTPT VISIT, EST, LEVL IV, 30-39 MIN: ICD-10-PCS | Mod: HCNC,S$GLB,, | Performed by: PHYSICIAN ASSISTANT

## 2022-02-03 PROCEDURE — 1159F MED LIST DOCD IN RCRD: CPT | Mod: HCNC,CPTII,S$GLB, | Performed by: PHYSICIAN ASSISTANT

## 2022-02-03 PROCEDURE — 3008F PR BODY MASS INDEX (BMI) DOCUMENTED: ICD-10-PCS | Mod: HCNC,CPTII,S$GLB, | Performed by: PHYSICIAN ASSISTANT

## 2022-02-03 PROCEDURE — 99999 PR PBB SHADOW E&M-EST. PATIENT-LVL V: ICD-10-PCS | Mod: PBBFAC,,, | Performed by: PHYSICIAN ASSISTANT

## 2022-02-03 PROCEDURE — 99999 PR PBB SHADOW E&M-EST. PATIENT-LVL V: CPT | Mod: PBBFAC,,, | Performed by: PHYSICIAN ASSISTANT

## 2022-02-03 PROCEDURE — 99499 UNLISTED E&M SERVICE: CPT | Mod: S$GLB,,, | Performed by: PHYSICIAN ASSISTANT

## 2022-02-03 PROCEDURE — 3074F SYST BP LT 130 MM HG: CPT | Mod: HCNC,CPTII,S$GLB, | Performed by: PHYSICIAN ASSISTANT

## 2022-02-03 PROCEDURE — 3074F PR MOST RECENT SYSTOLIC BLOOD PRESSURE < 130 MM HG: ICD-10-PCS | Mod: HCNC,CPTII,S$GLB, | Performed by: PHYSICIAN ASSISTANT

## 2022-02-03 PROCEDURE — 99499 RISK ADDL DX/OHS AUDIT: ICD-10-PCS | Mod: S$GLB,,, | Performed by: PHYSICIAN ASSISTANT

## 2022-02-03 RX ORDER — HYDROCODONE BITARTRATE AND ACETAMINOPHEN 7.5; 325 MG/1; MG/1
1 TABLET ORAL EVERY 6 HOURS PRN
Qty: 120 TABLET | Refills: 0 | Status: SHIPPED | OUTPATIENT
Start: 2022-03-04 | End: 2022-03-30 | Stop reason: SDUPTHER

## 2022-02-03 RX ORDER — HYDROCODONE BITARTRATE AND ACETAMINOPHEN 7.5; 325 MG/1; MG/1
1 TABLET ORAL EVERY 6 HOURS PRN
Qty: 120 TABLET | Refills: 0 | Status: SHIPPED | OUTPATIENT
Start: 2022-02-03 | End: 2022-03-04

## 2022-02-03 NOTE — PROGRESS NOTES
FOLLOW UP NOTE:     CHIEF COMPLAINT: neck, back, leg, and arm pain    INITIAL HISTORY OF PRESENT ILLNESS: Annie Baird is a 41 y.o. female with PMH significant for seizure disorder s/p vagal nerve stimulator placement, HTN, hx of anxiety on chronic benzodiazepines, hx of left shoulder arthroscopic surgery (12/4/2012), hx of right CTS release (11/22/2013), hx of multiple CVA's (residual left sided weakness per patient and daughter report), narcolepsy, insomnia, and hx of C5-C6 ACDF (5/1/2019) presents as a referral for the evaluation of multiple pain complaints. The patient reports that her low back pain is the worst of her pains. The patient has had a rather extensive work-up in [place], and I have put the relevant diagnostic tests in my note below. In summary:     The patient reports that her pain began approximately 1 year ago when she experienced low back pain. The patient denies of any inciting incident or trauma. The patient reports of worsening pain over time. She reported of progressive symptoms that extended into her lower extremities. She eventually presented to the hospital for evaluation of possible cauda equina syndrome. CT myelogram results copied below. In regards to her pain: the patient localizes her pain to center of her lower back. The patient reports of radiation to her bilateral buttocks and down the posterior aspect of her RLE to her foot. The patient describes her pain as a sharp, stinging, and burning type of pain. The patient reports of numbness in her left 2nd, 3rd, and 4th digits in her hand and her 1st and 2nd toes bilaterally. The patient reports that her pain is a 6/10. Patient denies of any fecal incontinence or saddle anesthesia. The patient reports of daily episodes of urinary incontinence which has been ongoing for the past few months. The patient reports of requiring the assistance of a wheelchair for ambulation for the past few months.      Aggravating factors: activity  in general     Mitigating factors: laying on her left side     Relevant Surgeries: yes; history of cervical spine surgery X 1     Interventional Therapies: yes; Dr. Chun Pat in [place]. Dr. Pat did RFA's and epidurals. No benefit.     INTERVAL HISTORY OF PRESENT ILLNESS: Annie Baird is a 42 y.o. female with PMH significant for seizure disorder s/p vagal nerve stimulator placement, HTN, hx of anxiety on chronic benzodiazepines, hx of left shoulder arthroscopic surgery (12/4/2012), hx of right CTS release (11/22/2013), hx of multiple CVA's (residual left sided weakness per patient and daughter report), narcolepsy, insomnia, and hx of C5-C6 ACDF (5/1/2019) presents as an established patient for the continued management of chronic pain in multiple areas. The patient is s/p lumbar interlaminar epidural steroid injection at L5-S1 under fluoroscopic guidance (left paramedian approach) on 7/12/2021 with improvement for a few days. Today, the patient complains of pain in multiple locations which including her low back, mid back near her shoulder blades, neck, feet, and hands. The patient reports that activity in general worsens her pain. The patient is tolerating her current pain regimen without adverse side effects. The patient denies of any significant changes in her health since her last appointment. The patient also denies of any changes in the character of her pain since her last appointment. The patient reports that her current pain is a 8/10. Divorce will be final in April. Patient denies of any urinary/fecal incontinence, saddle anesthesia, or weakness.      INTERVENTIONAL PAIN HISTORY:  7/12/2021:  Lumbar interlaminar epidural steroid injection at L5-S1 under fluoroscopic guidance (left paramedian approach) - improvement for a few days    CURRENT PAIN MEDICATIONS:   gabapentin 600/600/900 mg PO TID  Norco 7.5- 325 mg PO q 6 hr  zofran 4 mg PO q 8 hr     Previously taking:   Ativan (once or twice a  "month)  temazepam 30 mg PO QHS (insomnia)        ROS:  Review of Systems   Constitutional: Negative for chills and fever.   HENT: Negative for sore throat.    Eyes: Negative for visual disturbance.   Respiratory: Negative for shortness of breath.    Cardiovascular: Negative for chest pain.   Gastrointestinal: Negative for nausea and vomiting.   Genitourinary: Negative for difficulty urinating.   Musculoskeletal: Positive for arthralgias, back pain, gait problem, myalgias and neck pain.   Skin: Negative for rash.   Allergic/Immunologic: Negative for immunocompromised state.   Neurological: Positive for seizures, weakness and numbness. Negative for syncope.   Hematological: Does not bruise/bleed easily.   Psychiatric/Behavioral: Negative for suicidal ideas.        MEDICAL, SURGICAL, FAMILY, SOCIAL HX: reviewed    MEDICATIONS/ALLERGIES: reviewed    PHYSICAL EXAM:    VITALS: Vitals reviewed.   Vitals:    02/03/22 1316   BP: 124/77   Pulse: 83   Weight: 117.5 kg (259 lb)   Height: 5' 3" (1.6 m)   PainSc:   8   PainLoc: Back       Physical Exam  Vitals and nursing note reviewed.   Constitutional:       Appearance: She is not diaphoretic.   HENT:      Head: Normocephalic and atraumatic.   Eyes:      General:         Right eye: No discharge.         Left eye: No discharge.      Conjunctiva/sclera: Conjunctivae normal.   Cardiovascular:      Rate and Rhythm: Normal rate.   Pulmonary:      Effort: Pulmonary effort is normal. No respiratory distress.      Breath sounds: Normal breath sounds.   Abdominal:      Palpations: Abdomen is soft.   Skin:     General: Skin is warm and dry.      Findings: No rash.   Neurological:      Mental Status: She is alert and oriented to person, place, and time.   Psychiatric:         Mood and Affect: Mood and affect normal.         Cognition and Memory: Memory normal.         Judgment: Judgment normal.        UPPER EXTREMITIES: Normal alignment, normal range of motion, no atrophy, no skin " changes,  hair growth and nail growth normal and equal bilaterally. No swelling, no tenderness.    LOWER EXTREMITIES:  Normal alignment, normal range of motion, no atrophy, no skin changes,  hair growth and nail growth normal and equal bilaterally. No swelling, no tenderness.     CERVICAL SPINE:  Cervical spine: ROM is full in flexion, extension and lateral rotation with increased pain with passive ROM  Spurling's maneuver - deferred given prior fusion  Myofascial exam: Tenderness to palpation across cervical paraspinous region bilaterally.     LUMBAR SPINE  Lumbar spine: ROM is full with flexion but limited with extension and oblique extension with increased pain with extension   ((+)) Supine straight leg raise on the right side    ((+)) Facet loading   Internal and external rotation of the hip causes no increased pain on either side.  Myofascial exam: Tenderness to palpation across lumbar paraspinous muscles.     ((+)) TTP at the SI joint  ((+)) ELEAZAR's test  ((+)) Distraction test  ((--)) Thigh thrust     MOTOR: Tone and bulk: normal bilateral upper and lower Strength: normal   Delt      Bi         Tri        WE      WF                        R          5          5          5          5          5          5            L          5          5          5          5          5          4               IP         ADD     ABD     Quad   TA        Gas      HAM  R          5          5          5          5          5          5          5  L          5          5          5          5          5          5          5     SENSATION: Decreased sensation in the entire left hand (non-dermatomal distribution)  REFLEXES: normal, symmetric, nonbrisk.  Toes down, no clonus. Negative mendiola's sign bilaterally.  GAIT: antalgic gait; unsteady gait    IMAGING:    X-ray cervical spine (8/3/2021):  There is no detectable change in the appearance of the cervical spine compared to the prior study.  There are postsurgical  changes of ACDF at the C5-6 level.  Hardware is intact.  Interbody fusion device is unchanged in appearance.  There is mild endplate osteophyte formation at the C4-5 level.  Vertebral body height and alignment are normal.  There is no fracture.  The odontoid process is intact.  The patient has a vagal stimulator.  Otherwise, the soft tissues are normal.     X-ray thoracic spine (7/23/2021):  The thoracic vertebral bodies maintain normal height and alignment without significant disc space narrowing. The posterior elements are intact. There is mild osteophyte formation at the T11-12 and T12-L1 levels. The paraspinous soft tissues are normal.    ASSESSMENT: Annie Baird is a 41 y.o. female with PMH significant for seizure disorder s/p vagal nerve stimulator placement, HTN, hx of anxiety on chronic benzodiazepines, hx of left shoulder arthroscopic surgery (12/4/2012), hx of right CTS release (11/22/2013), hx of multiple CVA's (residual left sided weakness per patient and daughter report), narcolepsy, insomnia, and hx of C5-C6 ACDF (5/1/2019) presents as an established patient for the continued management of chronic pain in multiple areas. Treatment plan outlined below.     PLAN:  1. Prescribed Norco 7.5-325 mg PO q 6 hr PRN for breakthrough pain; # 120 tablets; 0 refills.  reviewed without discrepancies. Previous UDS consistent   2. Continue gabapentin as prescribed for neuropathic pain. Discussed may attribute to LE swelling.   3. Previous UDS tests positive for marijuana secondary to epidiolex which she takes for her seizure disorder  4. Encouraged the patient to discuss further work-up with her specialists at her upcoming appointments  5. Consider thoracic TPI in the future  6. F/u 2 months  All medication management performed by Dr. Lebron

## 2022-02-14 DIAGNOSIS — J30.89 NON-SEASONAL ALLERGIC RHINITIS, UNSPECIFIED TRIGGER: ICD-10-CM

## 2022-02-14 DIAGNOSIS — I10 HYPERTENSION, UNSPECIFIED TYPE: ICD-10-CM

## 2022-02-14 DIAGNOSIS — F41.1 GAD (GENERALIZED ANXIETY DISORDER): ICD-10-CM

## 2022-02-14 RX ORDER — LOSARTAN POTASSIUM 50 MG/1
50 TABLET ORAL NIGHTLY
Qty: 90 TABLET | Refills: 1 | Status: SHIPPED | OUTPATIENT
Start: 2022-02-14 | End: 2022-05-09 | Stop reason: SDUPTHER

## 2022-02-14 RX ORDER — HYDROXYZINE HYDROCHLORIDE 25 MG/1
25 TABLET, FILM COATED ORAL
Qty: 90 TABLET | Refills: 1 | Status: SHIPPED | OUTPATIENT
Start: 2022-02-14 | End: 2022-06-27 | Stop reason: SDUPTHER

## 2022-02-14 RX ORDER — METOPROLOL SUCCINATE 50 MG/1
50 TABLET, EXTENDED RELEASE ORAL DAILY
Qty: 90 TABLET | Refills: 1 | Status: SHIPPED | OUTPATIENT
Start: 2022-02-14 | End: 2022-05-09 | Stop reason: SDUPTHER

## 2022-02-14 RX ORDER — MONTELUKAST SODIUM 10 MG/1
10 TABLET ORAL NIGHTLY
Qty: 90 TABLET | Refills: 1 | Status: SHIPPED | OUTPATIENT
Start: 2022-02-14 | End: 2022-05-09 | Stop reason: SDUPTHER

## 2022-02-14 NOTE — TELEPHONE ENCOUNTER
Care Due:                  Date            Visit Type   Department     Provider  --------------------------------------------------------------------------------                                EP -                              PRIMARY      [facility]  Last Visit: 11-      CARE ([facility])   MARGIE Kirk                              EP -                              PRIMARY      [facility]  Next Visit: 11-      CARE ([facility])   MEDICINE       Andre Kirk                                                            Last  Test          Frequency    Reason                     Performed    Due Date  --------------------------------------------------------------------------------    Lipid Panel.  12 months..  rosuvastatin.............  05- 05-    Powered by [employer]. Reference number: [id number].   2/14/2022 2:01:43 PM CST

## 2022-02-23 ENCOUNTER — PATIENT MESSAGE (OUTPATIENT)
Dept: ADMINISTRATIVE | Facility: HOSPITAL | Age: 42
End: 2022-02-23
Payer: COMMERCIAL

## 2022-02-23 ENCOUNTER — PATIENT OUTREACH (OUTPATIENT)
Dept: ADMINISTRATIVE | Facility: HOSPITAL | Age: 42
End: 2022-02-23
Payer: COMMERCIAL

## 2022-02-23 NOTE — PROGRESS NOTES
[facility] and [employer] reviewed. No new  items found. Patient portal message sent regarding pap smear.

## 2022-03-03 ENCOUNTER — PATIENT OUTREACH (OUTPATIENT)
Dept: ADMINISTRATIVE | Facility: HOSPITAL | Age: 42
End: 2022-03-03
Payer: COMMERCIAL

## 2022-03-03 NOTE — LETTER
March 3, 2022    Summer Izabella Baird  [address] [place] [address]             [facility]  [address]  [facility] [address]  Phone: [phone] Hi,  I hope you are well. According to our records you are due for a Pap smear. If you have had this done elsewhere, will you please let us know so that we may request a copy of your report and update your record here at [facility]. If you have not, will you please give us a call at [phone] and we will be happy to help you get this scheduled.     Thanks and have a good day!    Cely Jones LPN Clinical Care Coordinator  [facility] [place] [address]  [place] [id number]  [phone] (phone)  [phone] (fax)

## 2022-03-03 NOTE — PROGRESS NOTES
"Attempted to outreach patient for Pap Smear via "[employer]", no answer after a week. Sending outreach via mail out letter now.    "

## 2022-03-10 ENCOUNTER — PATIENT MESSAGE (OUTPATIENT)
Dept: RHEUMATOLOGY | Facility: CLINIC | Age: 42
End: 2022-03-10
Payer: COMMERCIAL

## 2022-03-21 ENCOUNTER — PATIENT OUTREACH (OUTPATIENT)
Dept: ADMINISTRATIVE | Facility: OTHER | Age: 42
End: 2022-03-21
Payer: COMMERCIAL

## 2022-03-30 ENCOUNTER — OFFICE VISIT (OUTPATIENT)
Dept: PAIN MEDICINE | Facility: CLINIC | Age: 42
End: 2022-03-30
Payer: COMMERCIAL

## 2022-03-30 ENCOUNTER — TELEPHONE (OUTPATIENT)
Dept: PAIN MEDICINE | Facility: CLINIC | Age: 42
End: 2022-03-30
Payer: COMMERCIAL

## 2022-03-30 DIAGNOSIS — M51.36 DDD (DEGENERATIVE DISC DISEASE), LUMBAR: Primary | ICD-10-CM

## 2022-03-30 DIAGNOSIS — M51.36 DDD (DEGENERATIVE DISC DISEASE), LUMBAR: ICD-10-CM

## 2022-03-30 DIAGNOSIS — M54.16 LUMBAR RADICULOPATHY: ICD-10-CM

## 2022-03-30 DIAGNOSIS — M50.30 DDD (DEGENERATIVE DISC DISEASE), CERVICAL: ICD-10-CM

## 2022-03-30 DIAGNOSIS — M51.34 DDD (DEGENERATIVE DISC DISEASE), THORACIC: ICD-10-CM

## 2022-03-30 PROCEDURE — 4010F PR ACE/ARB THEARPY RXD/TAKEN: ICD-10-PCS | Mod: CPTII,95,, | Performed by: PHYSICIAN ASSISTANT

## 2022-03-30 PROCEDURE — 4010F ACE/ARB THERAPY RXD/TAKEN: CPT | Mod: CPTII,95,, | Performed by: PHYSICIAN ASSISTANT

## 2022-03-30 PROCEDURE — 99214 OFFICE O/P EST MOD 30 MIN: CPT | Mod: 95,,, | Performed by: PHYSICIAN ASSISTANT

## 2022-03-30 PROCEDURE — 99214 PR OFFICE/OUTPT VISIT, EST, LEVL IV, 30-39 MIN: ICD-10-PCS | Mod: 95,,, | Performed by: PHYSICIAN ASSISTANT

## 2022-03-30 RX ORDER — HYDROCODONE BITARTRATE AND ACETAMINOPHEN 7.5; 325 MG/1; MG/1
1 TABLET ORAL EVERY 6 HOURS PRN
Qty: 120 TABLET | Refills: 0 | Status: SHIPPED | OUTPATIENT
Start: 2022-04-02 | End: 2022-05-02 | Stop reason: SDUPTHER

## 2022-03-30 RX ORDER — HYDROCODONE BITARTRATE AND ACETAMINOPHEN 7.5; 325 MG/1; MG/1
TABLET ORAL
Qty: 120 TABLET | Refills: 0 | OUTPATIENT
Start: 2022-03-30

## 2022-03-30 NOTE — TELEPHONE ENCOUNTER
Made virtual with Cathi as pt states she cannot drive and does not have transportation to clinic.

## 2022-03-30 NOTE — PROGRESS NOTES
FOLLOW UP NOTE:     CHIEF COMPLAINT: neck, back, leg, and arm pain    Visit type: Virtual visit with synchronous audio and video  Total time spent with patient: 25 minutes  Each patient to whom he or she provides medical services by telemedicine is:  (1) informed of the relationship between the physician and patient and the respective role of any other health care provider with respect to management of the patient; and (2) notified that he or she may decline to receive medical services by telemedicine and may withdraw from such care at any time.  Patient is same at [place], [place].   INITIAL HISTORY OF PRESENT ILLNESS: Annie Baird is a 41 y.o. female with PMH significant for seizure disorder s/p vagal nerve stimulator placement, HTN, hx of anxiety on chronic benzodiazepines, hx of left shoulder arthroscopic surgery (12/4/2012), hx of right CTS release (11/22/2013), hx of multiple CVA's (residual left sided weakness per patient and daughter report), narcolepsy, insomnia, and hx of C5-C6 ACDF (5/1/2019) presents as a referral for the evaluation of multiple pain complaints. The patient reports that her low back pain is the worst of her pains. The patient has had a rather extensive work-up in [place], and I have put the relevant diagnostic tests in my note below. In summary:     The patient reports that her pain began approximately 1 year ago when she experienced low back pain. The patient denies of any inciting incident or trauma. The patient reports of worsening pain over time. She reported of progressive symptoms that extended into her lower extremities. She eventually presented to the hospital for evaluation of possible cauda equina syndrome. CT myelogram results copied below. In regards to her pain: the patient localizes her pain to center of her lower back. The patient reports of radiation to her bilateral buttocks and down the posterior aspect of her RLE to her foot. The patient  describes her pain as a sharp, stinging, and burning type of pain. The patient reports of numbness in her left 2nd, 3rd, and 4th digits in her hand and her 1st and 2nd toes bilaterally. The patient reports that her pain is a 6/10. Patient denies of any fecal incontinence or saddle anesthesia. The patient reports of daily episodes of urinary incontinence which has been ongoing for the past few months. The patient reports of requiring the assistance of a wheelchair for ambulation for the past few months.      Aggravating factors: activity in general     Mitigating factors: laying on her left side     Relevant Surgeries: yes; history of cervical spine surgery X 1     Interventional Therapies: yes; Dr. Chun Pat in [place]. Dr. Pat did RFA's and epidurals. No benefit.     INTERVAL HISTORY OF PRESENT ILLNESS: Annie Baird is a 42 y.o. female with PMH significant for seizure disorder s/p vagal nerve stimulator placement, HTN, hx of anxiety on chronic benzodiazepines, hx of left shoulder arthroscopic surgery (12/4/2012), hx of right CTS release (11/22/2013), hx of multiple CVA's (residual left sided weakness per patient and daughter report), narcolepsy, insomnia, and hx of C5-C6 ACDF (5/1/2019) presents as an established patient for the continued management of chronic pain in multiple areas. The patient is s/p lumbar interlaminar epidural steroid injection at L5-S1 under fluoroscopic guidance (left paramedian approach) on 7/12/2021 with improvement for a few days. Today, the patient complains of pain in multiple locations which including her low back, mid back near her shoulder blades, neck, feet, and hands. The patient reports that activity in general worsens her pain. The patient is tolerating her current pain regimen without adverse side effects. The patient denies of any significant changes in her health since her last appointment. The patient also denies of any changes in the character of her pain since her  last appointment. The patient reports that her current pain is a 8/10. Divorce will be final in April. Mom is on hospice.  Patient denies of any urinary/fecal incontinence, saddle anesthesia, or weakness.      INTERVENTIONAL PAIN HISTORY:  7/12/2021:  Lumbar interlaminar epidural steroid injection at L5-S1 under fluoroscopic guidance (left paramedian approach) - improvement for a few days    CURRENT PAIN MEDICATIONS:   gabapentin 600/600/900 mg PO TID  Norco 7.5- 325 mg PO q 6 hr  zofran 4 mg PO q 8 hr     Previously taking:   Ativan (once or twice a month)  temazepam 30 mg PO QHS (insomnia)        ROS:  Review of Systems   Constitutional: Negative for chills and fever.   HENT: Negative for sore throat.    Eyes: Negative for visual disturbance.   Respiratory: Negative for shortness of breath.    Cardiovascular: Negative for chest pain.   Gastrointestinal: Negative for nausea and vomiting.   Genitourinary: Negative for difficulty urinating.   Musculoskeletal: Positive for arthralgias, back pain, gait problem, myalgias and neck pain.   Skin: Negative for rash.   Allergic/Immunologic: Negative for immunocompromised state.   Neurological: Positive for seizures, weakness and numbness. Negative for syncope.   Hematological: Does not bruise/bleed easily.   Psychiatric/Behavioral: Negative for suicidal ideas.        MEDICAL, SURGICAL, FAMILY, SOCIAL HX: reviewed    MEDICATIONS/ALLERGIES: reviewed    PHYSICAL EXAM:    VITALS: Vitals reviewed.       GENERAL: A and O x3, the patient appears well groomed and is in no acute distress.  Skin: No rashes or obvious lesions  HEENT: normocephalic, atraumatic  LUNGS: non labored breathing  ABDOMEN: non distended  UPPER EXTREMITIES and lower extremities. Normal ROM  CERVICAL SPINE:  Cervical spine: ROM is full in flexion, extension and lateral rotation without increased pain.    LUMBAR SPINE  Lumbar spine: ROM is limited with flexion extension and oblique extension with mild-to-moderate  increased pain.      MENTAL STATUS: normal orientation, speech, language, and fund of knowledge for social situation.  Emotional state appropriate.    GAIT: normal rise, base, steps, and arm swing.    GAIT: antalgic gait; unsteady gait    IMAGING:    X-ray cervical spine (8/3/2021):  There is no detectable change in the appearance of the cervical spine compared to the prior study.  There are postsurgical changes of ACDF at the C5-6 level.  Hardware is intact.  Interbody fusion device is unchanged in appearance.  There is mild endplate osteophyte formation at the C4-5 level.  Vertebral body height and alignment are normal.  There is no fracture.  The odontoid process is intact.  The patient has a vagal stimulator.  Otherwise, the soft tissues are normal.     X-ray thoracic spine (7/23/2021):  The thoracic vertebral bodies maintain normal height and alignment without significant disc space narrowing. The posterior elements are intact. There is mild osteophyte formation at the T11-12 and T12-L1 levels. The paraspinous soft tissues are normal.    ASSESSMENT: Annie Baird is a 41 y.o. female with PMH significant for seizure disorder s/p vagal nerve stimulator placement, HTN, hx of anxiety on chronic benzodiazepines, hx of left shoulder arthroscopic surgery (12/4/2012), hx of right CTS release (11/22/2013), hx of multiple CVA's (residual left sided weakness per patient and daughter report), narcolepsy, insomnia, and hx of C5-C6 ACDF (5/1/2019) presents as an established patient for the continued management of chronic pain in multiple areas. Treatment plan outlined below.     PLAN:  1. Prescribed Norco 7.5-325 mg PO q 6 hr PRN for breakthrough pain; # 120 tablets; 0 refills.  reviewed without discrepancies. Previous UDS consistent   2. Continue gabapentin as prescribed for neuropathic pain. Discussed may attribute to LE swelling.   3. Previous UDS tests positive for marijuana secondary to epidiolex which she  takes for her seizure disorder  4. Encouraged the patient to discuss further work-up with her specialists at her upcoming appointments  5. Consider thoracic TPI in the future  6. F/u 1 month   All medication management performed by Dr. Lebron

## 2022-03-30 NOTE — TELEPHONE ENCOUNTER
----- Message from Yolie Lebron MD sent at 3/30/2022  3:59 PM CDT -----  Based on her last fill, the earliest the next script can be picked up is 4/1.   ----- Message -----  From: Deana Powell LPN  Sent: 3/30/2022   3:54 PM CDT  To: Yolie Lebron MD    Is medication supposed to be for 04/02 or today? The rx says 04/02 on it.   ----- Message -----  From: Yolie Lebron MD  Sent: 3/30/2022   3:48 PM CDT  To: Cathi Rossi PA-C, #    Just received a message from the pharmacy saying that they are unable to fill the prescription. No reason given. Not sure where the patient would like to get her script filled.

## 2022-03-30 NOTE — TELEPHONE ENCOUNTER
----- Message from Jimbo Bernal sent at 3/30/2022  1:27 PM CDT -----  Name Of Caller: Annie        Provider Name: Cathi Rossi        Does patient feel the need to be seen today? no        Relationship to the Pt?: patient        Contact Preference?: [phone]        What is the nature of the call?: Patient states that she would like to speak with someone in the office regarding her appointment that she cancelled that was scheduled for tomorrow 3- at 9am, states that she would like to speak with the office to reschedule that appointment because she needs very specific  date and time

## 2022-03-31 RX ORDER — HYDROCODONE BITARTRATE AND ACETAMINOPHEN 7.5; 325 MG/1; MG/1
1 TABLET ORAL EVERY 6 HOURS PRN
Qty: 120 TABLET | Refills: 0 | Status: ON HOLD | OUTPATIENT
Start: 2022-04-01 | End: 2022-05-27 | Stop reason: CLARIF

## 2022-04-25 ENCOUNTER — PATIENT OUTREACH (OUTPATIENT)
Dept: ADMINISTRATIVE | Facility: OTHER | Age: 42
End: 2022-04-25
Payer: COMMERCIAL

## 2022-05-02 ENCOUNTER — OFFICE VISIT (OUTPATIENT)
Dept: PAIN MEDICINE | Facility: CLINIC | Age: 42
End: 2022-05-02
Payer: COMMERCIAL

## 2022-05-02 VITALS
WEIGHT: 259 LBS | HEIGHT: 63 IN | SYSTOLIC BLOOD PRESSURE: 147 MMHG | DIASTOLIC BLOOD PRESSURE: 92 MMHG | HEART RATE: 74 BPM | BODY MASS INDEX: 45.89 KG/M2

## 2022-05-02 DIAGNOSIS — M51.36 DDD (DEGENERATIVE DISC DISEASE), LUMBAR: ICD-10-CM

## 2022-05-02 DIAGNOSIS — M70.62 GREATER TROCHANTERIC BURSITIS OF LEFT HIP: ICD-10-CM

## 2022-05-02 DIAGNOSIS — G89.4 CHRONIC PAIN DISORDER: Primary | ICD-10-CM

## 2022-05-02 DIAGNOSIS — M54.16 LUMBAR RADICULOPATHY: ICD-10-CM

## 2022-05-02 DIAGNOSIS — M50.30 DDD (DEGENERATIVE DISC DISEASE), CERVICAL: ICD-10-CM

## 2022-05-02 DIAGNOSIS — M53.3 SACROILIAC JOINT PAIN: Primary | ICD-10-CM

## 2022-05-02 PROCEDURE — 99999 PR PBB SHADOW E&M-EST. PATIENT-LVL III: ICD-10-PCS | Mod: PBBFAC,,, | Performed by: PHYSICIAN ASSISTANT

## 2022-05-02 PROCEDURE — 3080F PR MOST RECENT DIASTOLIC BLOOD PRESSURE >= 90 MM HG: ICD-10-PCS | Mod: CPTII,S$GLB,, | Performed by: PHYSICIAN ASSISTANT

## 2022-05-02 PROCEDURE — 3008F PR BODY MASS INDEX (BMI) DOCUMENTED: ICD-10-PCS | Mod: CPTII,S$GLB,, | Performed by: PHYSICIAN ASSISTANT

## 2022-05-02 PROCEDURE — 99999 PR PBB SHADOW E&M-EST. PATIENT-LVL III: CPT | Mod: PBBFAC,,, | Performed by: PHYSICIAN ASSISTANT

## 2022-05-02 PROCEDURE — 4010F ACE/ARB THERAPY RXD/TAKEN: CPT | Mod: CPTII,S$GLB,, | Performed by: PHYSICIAN ASSISTANT

## 2022-05-02 PROCEDURE — 3008F BODY MASS INDEX DOCD: CPT | Mod: CPTII,S$GLB,, | Performed by: PHYSICIAN ASSISTANT

## 2022-05-02 PROCEDURE — 3080F DIAST BP >= 90 MM HG: CPT | Mod: CPTII,S$GLB,, | Performed by: PHYSICIAN ASSISTANT

## 2022-05-02 PROCEDURE — 3077F PR MOST RECENT SYSTOLIC BLOOD PRESSURE >= 140 MM HG: ICD-10-PCS | Mod: CPTII,S$GLB,, | Performed by: PHYSICIAN ASSISTANT

## 2022-05-02 PROCEDURE — 4010F PR ACE/ARB THEARPY RXD/TAKEN: ICD-10-PCS | Mod: CPTII,S$GLB,, | Performed by: PHYSICIAN ASSISTANT

## 2022-05-02 PROCEDURE — 99214 OFFICE O/P EST MOD 30 MIN: CPT | Mod: S$GLB,,, | Performed by: PHYSICIAN ASSISTANT

## 2022-05-02 PROCEDURE — 3077F SYST BP >= 140 MM HG: CPT | Mod: CPTII,S$GLB,, | Performed by: PHYSICIAN ASSISTANT

## 2022-05-02 PROCEDURE — 99214 PR OFFICE/OUTPT VISIT, EST, LEVL IV, 30-39 MIN: ICD-10-PCS | Mod: S$GLB,,, | Performed by: PHYSICIAN ASSISTANT

## 2022-05-02 RX ORDER — HYDROCODONE BITARTRATE AND ACETAMINOPHEN 7.5; 325 MG/1; MG/1
1 TABLET ORAL EVERY 6 HOURS PRN
Qty: 120 TABLET | Refills: 0 | Status: SHIPPED | OUTPATIENT
Start: 2022-05-31 | End: 2022-06-01 | Stop reason: SDUPTHER

## 2022-05-02 RX ORDER — HYDROCODONE BITARTRATE AND ACETAMINOPHEN 7.5; 325 MG/1; MG/1
1 TABLET ORAL EVERY 6 HOURS PRN
Qty: 120 TABLET | Refills: 0 | Status: SHIPPED | OUTPATIENT
Start: 2022-05-02 | End: 2022-05-31

## 2022-05-02 NOTE — PROGRESS NOTES
FOLLOW UP NOTE:     CHIEF COMPLAINT: neck, back, leg, and arm pain    INITIAL HISTORY OF PRESENT ILLNESS: Annie Baird is a 41 y.o. female with PMH significant for seizure disorder s/p vagal nerve stimulator placement, HTN, hx of anxiety on chronic benzodiazepines, hx of left shoulder arthroscopic surgery (12/4/2012), hx of right CTS release (11/22/2013), hx of multiple CVA's (residual left sided weakness per patient and daughter report), narcolepsy, insomnia, and hx of C5-C6 ACDF (5/1/2019) presents as a referral for the evaluation of multiple pain complaints. The patient reports that her low back pain is the worst of her pains. The patient has had a rather extensive work-up in [place], and I have put the relevant diagnostic tests in my note below. In summary:     The patient reports that her pain began approximately 1 year ago when she experienced low back pain. The patient denies of any inciting incident or trauma. The patient reports of worsening pain over time. She reported of progressive symptoms that extended into her lower extremities. She eventually presented to the hospital for evaluation of possible cauda equina syndrome. CT myelogram results copied below. In regards to her pain: the patient localizes her pain to center of her lower back. The patient reports of radiation to her bilateral buttocks and down the posterior aspect of her RLE to her foot. The patient describes her pain as a sharp, stinging, and burning type of pain. The patient reports of numbness in her left 2nd, 3rd, and 4th digits in her hand and her 1st and 2nd toes bilaterally. The patient reports that her pain is a 6/10. Patient denies of any fecal incontinence or saddle anesthesia. The patient reports of daily episodes of urinary incontinence which has been ongoing for the past few months. The patient reports of requiring the assistance of a wheelchair for ambulation for the past few months.      Aggravating factors: activity  in general     Mitigating factors: laying on her left side     Relevant Surgeries: yes; history of cervical spine surgery X 1     Interventional Therapies: yes; Dr. Chun Pat in [place]. Dr. Pat did RFA's and epidurals. No benefit.     INTERVAL HISTORY OF PRESENT ILLNESS: Annie Baird is a 42 y.o. female with PMH significant for seizure disorder s/p vagal nerve stimulator placement, HTN, hx of anxiety on chronic benzodiazepines, hx of left shoulder arthroscopic surgery (12/4/2012), hx of right CTS release (11/22/2013), hx of multiple CVA's (residual left sided weakness per patient and daughter report), narcolepsy, insomnia, and hx of C5-C6 ACDF (5/1/2019) presents as an established patient for the continued management of chronic pain in multiple areas. Today cc is severe pain in left gluteal region and left lateral hip. Pain began while taking care of her mom which required a good deal of lifting and stair climbing. Pain worsens with ambulation and stairs. Improves with rest but does worsen if she lays on her left side. The patient is s/p lumbar interlaminar epidural steroid injection at L5-S1 under fluoroscopic guidance (left paramedian approach) on 7/12/2021 with improvement for a few days. Today, the patient complains of pain in multiple locations which including her low back, mid back near her shoulder blades, neck, feet, and hands. The patient reports that activity in general worsens her pain. The patient is tolerating her current pain regimen without adverse side effects. The patient denies of any significant changes in her health since her last appointment. The patient also denies of any changes in the character of her pain since her last appointment. The patient reports that her current pain is a 6/10. Divorce was finalized in April. Patient denies of any urinary/fecal incontinence, saddle anesthesia, or weakness.      INTERVENTIONAL PAIN HISTORY:  7/12/2021:  Lumbar interlaminar epidural steroid  "injection at L5-S1 under fluoroscopic guidance (left paramedian approach) - improvement for a few days    CURRENT PAIN MEDICATIONS:   gabapentin 600/600/900 mg PO TID  Norco 7.5- 325 mg PO q 6 hr  zofran 4 mg PO q 8 hr     Previously taking:   Ativan (once or twice a month)  temazepam 30 mg PO QHS (insomnia)        ROS:  Review of Systems   Constitutional: Negative for chills and fever.   HENT: Negative for sore throat.    Eyes: Negative for visual disturbance.   Respiratory: Negative for shortness of breath.    Cardiovascular: Negative for chest pain.   Gastrointestinal: Negative for nausea and vomiting.   Genitourinary: Negative for difficulty urinating.   Musculoskeletal: Positive for arthralgias, back pain, gait problem, myalgias and neck pain.   Skin: Negative for rash.   Allergic/Immunologic: Negative for immunocompromised state.   Neurological: Positive for seizures, weakness and numbness. Negative for syncope.   Hematological: Does not bruise/bleed easily.   Psychiatric/Behavioral: Negative for suicidal ideas.        MEDICAL, SURGICAL, FAMILY, SOCIAL HX: reviewed    MEDICATIONS/ALLERGIES: reviewed    PHYSICAL EXAM:    VITALS: Vitals reviewed.   Vitals:    05/02/22 1104   BP: (!) 147/92   Pulse: 74   Weight: 117.5 kg (259 lb)   Height: 5' 3" (1.6 m)   PainSc:   6   PainLoc: Neck       Physical Exam  Vitals and nursing note reviewed.   Constitutional:       Appearance: She is not diaphoretic.   HENT:      Head: Normocephalic and atraumatic.   Eyes:      General:         Right eye: No discharge.         Left eye: No discharge.      Conjunctiva/sclera: Conjunctivae normal.   Cardiovascular:      Rate and Rhythm: Normal rate.   Pulmonary:      Effort: Pulmonary effort is normal. No respiratory distress.      Breath sounds: Normal breath sounds.   Abdominal:      Palpations: Abdomen is soft.   Skin:     General: Skin is warm and dry.      Findings: No rash.   Neurological:      Mental Status: She is alert and " oriented to person, place, and time.   Psychiatric:         Mood and Affect: Mood and affect normal.         Cognition and Memory: Memory normal.         Judgment: Judgment normal.        UPPER EXTREMITIES: Normal alignment, normal range of motion, no atrophy, no skin changes,  hair growth and nail growth normal and equal bilaterally. No swelling, no tenderness.    LOWER EXTREMITIES:  Normal alignment, normal range of motion, no atrophy, no skin changes,  hair growth and nail growth normal and equal bilaterally. No swelling, no tenderness.     CERVICAL SPINE:  Cervical spine: ROM is full in flexion, extension and lateral rotation with increased pain with passive ROM  Spurling's maneuver - deferred given prior fusion  Myofascial exam: Tenderness to palpation across cervical paraspinous region bilaterally.     LUMBAR SPINE  Lumbar spine: ROM is full with flexion but limited with extension and oblique extension with increased pain with extension   ((+)) Supine straight leg raise on the right side    ((+)) Facet loading   Internal and external rotation of the hip causes no increased pain on either side.  Myofascial exam: Tenderness to palpation across lumbar paraspinous muscles.     ((+)) TTP at the SI joint on left  ((+)) ELEAZAR's test  ((+)) Distraction test  ((--)) Thigh thrust   TTP left GTB   MOTOR: Tone and bulk: normal bilateral upper and lower Strength: normal   Delt      Bi         Tri        WE      WF                        R          5          5          5          5          5          5            L          5          5          5          5          5          4               IP         ADD     ABD     Quad   TA        Gas      HAM  R          5          5          5          5          5          5          5  L          5          5          5          5          5          5          5     SENSATION: Decreased sensation in the entire left hand (non-dermatomal distribution)  REFLEXES: normal,  symmetric, nonbrisk.  Toes down, no clonus. Negative mendiola's sign bilaterally.  GAIT: antalgic gait; unsteady gait    IMAGING:    X-ray cervical spine (8/3/2021):  There is no detectable change in the appearance of the cervical spine compared to the prior study.  There are postsurgical changes of ACDF at the C5-6 level.  Hardware is intact.  Interbody fusion device is unchanged in appearance.  There is mild endplate osteophyte formation at the C4-5 level.  Vertebral body height and alignment are normal.  There is no fracture.  The odontoid process is intact.  The patient has a vagal stimulator.  Otherwise, the soft tissues are normal.     X-ray thoracic spine (7/23/2021):  The thoracic vertebral bodies maintain normal height and alignment without significant disc space narrowing. The posterior elements are intact. There is mild osteophyte formation at the T11-12 and T12-L1 levels. The paraspinous soft tissues are normal.    ASSESSMENT: Annie Baird is a 41 y.o. female with PMH significant for seizure disorder s/p vagal nerve stimulator placement, HTN, hx of anxiety on chronic benzodiazepines, hx of left shoulder arthroscopic surgery (12/4/2012), hx of right CTS release (11/22/2013), hx of multiple CVA's (residual left sided weakness per patient and daughter report), narcolepsy, insomnia, and hx of C5-C6 ACDF (5/1/2019) presents as an established patient for the continued management of chronic pain in multiple areas. Treatment plan outlined below.     PLAN:  1. Prescribed Norco 7.5-325 mg PO q 6 hr PRN for breakthrough pain; # 120 tablets; 0 refills.  reviewed without discrepancies. Previous UDS consistent   2. Continue gabapentin as prescribed for neuropathic pain. Discussed may attribute to LE swelling.   3. Previous UDS tests positive for marijuana secondary to epidiolex which she takes for her seizure disorder  4. Encouraged the patient to discuss further work-up with her specialists at her upcoming  appointments  5. Schedule left SIJ and GTB injections. I have explained the risks, benefits, and alternatives of the procedure in detail. The patient voices understanding and all questions have been answered. The patient agrees to proceed as planned. Written Consent obtained.   6. F/u 2 months  All medication management performed by Dr. Lebron

## 2022-05-02 NOTE — TELEPHONE ENCOUNTER
----- Message from Jacklyn Guerra sent at 5/2/2022 11:51 AM CDT -----  Contact: Patient  Type: Patient Call Back         Who called: Patient         What is the request in detail: matt left office and was sched for  a procedure on 5/16 w/ Dr Vi Lopez; wants to know if procedure can be resched for 5/27 if any times are avail that day; please advise         Best call back number: [phone]         Additional Information: avail anytime hat day           Thank You7

## 2022-05-09 DIAGNOSIS — K21.9 GASTROESOPHAGEAL REFLUX DISEASE, UNSPECIFIED WHETHER ESOPHAGITIS PRESENT: ICD-10-CM

## 2022-05-09 DIAGNOSIS — I10 HYPERTENSION, UNSPECIFIED TYPE: ICD-10-CM

## 2022-05-09 DIAGNOSIS — J30.89 NON-SEASONAL ALLERGIC RHINITIS, UNSPECIFIED TRIGGER: ICD-10-CM

## 2022-05-09 DIAGNOSIS — N32.81 OVERACTIVE BLADDER: ICD-10-CM

## 2022-05-09 DIAGNOSIS — M54.50 CHRONIC MIDLINE LOW BACK PAIN, UNSPECIFIED WHETHER SCIATICA PRESENT: Primary | ICD-10-CM

## 2022-05-09 DIAGNOSIS — G89.29 CHRONIC MIDLINE LOW BACK PAIN, UNSPECIFIED WHETHER SCIATICA PRESENT: Primary | ICD-10-CM

## 2022-05-09 DIAGNOSIS — E78.5 HYPERLIPIDEMIA, UNSPECIFIED HYPERLIPIDEMIA TYPE: ICD-10-CM

## 2022-05-09 RX ORDER — LEVETIRACETAM 750 MG/1
1500 TABLET ORAL 2 TIMES DAILY
Qty: 360 TABLET | Refills: 3 | Status: SHIPPED | OUTPATIENT
Start: 2022-05-09 | End: 2022-06-27 | Stop reason: SDUPTHER

## 2022-05-09 RX ORDER — LAMOTRIGINE 25 MG/1
25 TABLET ORAL 2 TIMES DAILY
Qty: 180 TABLET | Refills: 3 | Status: SHIPPED | OUTPATIENT
Start: 2022-05-09 | End: 2022-06-27 | Stop reason: SDUPTHER

## 2022-05-09 RX ORDER — LAMOTRIGINE 150 MG/1
300 TABLET ORAL 2 TIMES DAILY
Qty: 360 TABLET | Refills: 3 | Status: SHIPPED | OUTPATIENT
Start: 2022-05-09 | End: 2022-06-27 | Stop reason: SDUPTHER

## 2022-05-09 NOTE — TELEPHONE ENCOUNTER
Care Due:                  Date            Visit Type   Department     Provider  --------------------------------------------------------------------------------                                EP -                              PRIMARY      [facility]  Last Visit: 11-      CARE ([facility])   MARGIE Kirk                              EP -                              PRIMARY      [facility]  Next Visit: 11-      CARE ([facility])   MEDICINE       Andre Kirk                                                            Last  Test          Frequency    Reason                     Performed    Due Date  --------------------------------------------------------------------------------    Lipid Panel.  12 months..  rosuvastatin.............  05- 05-    [facility] Embedded Care Gaps. Reference number: [id number]. 5/09/2022   2:17:22 PM CDT

## 2022-05-10 RX ORDER — MONTELUKAST SODIUM 10 MG/1
10 TABLET ORAL NIGHTLY
Qty: 90 TABLET | Refills: 1 | Status: SHIPPED | OUTPATIENT
Start: 2022-05-10 | End: 2022-05-10

## 2022-05-10 RX ORDER — MONTELUKAST SODIUM 10 MG/1
10 TABLET ORAL NIGHTLY
Qty: 90 TABLET | Refills: 1 | Status: SHIPPED | OUTPATIENT
Start: 2022-05-10 | End: 2022-06-27 | Stop reason: SDUPTHER

## 2022-05-10 RX ORDER — TOLTERODINE 2 MG/1
4 CAPSULE, EXTENDED RELEASE ORAL DAILY
Qty: 180 CAPSULE | Refills: 0 | Status: SHIPPED | OUTPATIENT
Start: 2022-05-10 | End: 2022-06-27 | Stop reason: SDUPTHER

## 2022-05-10 RX ORDER — METOPROLOL SUCCINATE 50 MG/1
50 TABLET, EXTENDED RELEASE ORAL DAILY
Qty: 90 TABLET | Refills: 1 | Status: SHIPPED | OUTPATIENT
Start: 2022-05-10 | End: 2022-06-27 | Stop reason: SDUPTHER

## 2022-05-10 RX ORDER — METOPROLOL SUCCINATE 50 MG/1
50 TABLET, EXTENDED RELEASE ORAL DAILY
Qty: 90 TABLET | Refills: 1 | Status: SHIPPED | OUTPATIENT
Start: 2022-05-10 | End: 2022-05-10

## 2022-05-10 RX ORDER — LOSARTAN POTASSIUM 50 MG/1
50 TABLET ORAL NIGHTLY
Qty: 90 TABLET | Refills: 1 | Status: SHIPPED | OUTPATIENT
Start: 2022-05-10 | End: 2022-06-27 | Stop reason: SDUPTHER

## 2022-05-10 RX ORDER — TOLTERODINE 2 MG/1
4 CAPSULE, EXTENDED RELEASE ORAL DAILY
Qty: 180 CAPSULE | Refills: 0 | Status: SHIPPED | OUTPATIENT
Start: 2022-05-10 | End: 2022-05-10

## 2022-05-10 RX ORDER — OMEPRAZOLE 40 MG/1
40 CAPSULE, DELAYED RELEASE ORAL EVERY MORNING
Qty: 90 CAPSULE | Refills: 2 | Status: SHIPPED | OUTPATIENT
Start: 2022-05-10 | End: 2022-06-27 | Stop reason: SDUPTHER

## 2022-05-10 RX ORDER — GABAPENTIN 600 MG/1
600 TABLET ORAL 3 TIMES DAILY
Qty: 90 TABLET | Refills: 0 | Status: SHIPPED | OUTPATIENT
Start: 2022-05-10 | End: 2022-06-27 | Stop reason: SDUPTHER

## 2022-05-10 RX ORDER — ROSUVASTATIN CALCIUM 20 MG/1
20 TABLET, COATED ORAL DAILY
Qty: 90 TABLET | Refills: 3 | Status: SHIPPED | OUTPATIENT
Start: 2022-05-10 | End: 2022-06-27

## 2022-05-10 RX ORDER — OMEPRAZOLE 40 MG/1
40 CAPSULE, DELAYED RELEASE ORAL EVERY MORNING
Qty: 90 CAPSULE | Refills: 2 | Status: SHIPPED | OUTPATIENT
Start: 2022-05-10 | End: 2022-05-10

## 2022-05-10 RX ORDER — LOSARTAN POTASSIUM 50 MG/1
50 TABLET ORAL NIGHTLY
Qty: 90 TABLET | Refills: 1 | Status: SHIPPED | OUTPATIENT
Start: 2022-05-10 | End: 2022-05-10

## 2022-05-10 RX ORDER — GABAPENTIN 600 MG/1
600 TABLET ORAL 3 TIMES DAILY
Qty: 90 TABLET | Refills: 0 | Status: SHIPPED | OUTPATIENT
Start: 2022-05-10 | End: 2022-05-10 | Stop reason: SDUPTHER

## 2022-05-11 ENCOUNTER — PATIENT OUTREACH (OUTPATIENT)
Dept: ADMINISTRATIVE | Facility: HOSPITAL | Age: 42
End: 2022-05-11
Payer: COMMERCIAL

## 2022-05-11 ENCOUNTER — TELEPHONE (OUTPATIENT)
Dept: FAMILY MEDICINE | Facility: CLINIC | Age: 42
End: 2022-05-11
Payer: COMMERCIAL

## 2022-05-11 NOTE — PROGRESS NOTES
Called patient. No answer. Left message along with contact information to please call back regarding scheduling/ overdue HM (pap smear and mammogram).  Note added to scheduled 5/12/22 FP provider appointment note in appointment desk of  pap smear and mammogram due.

## 2022-05-12 ENCOUNTER — PATIENT MESSAGE (OUTPATIENT)
Dept: SMOKING CESSATION | Facility: CLINIC | Age: 42
End: 2022-05-12
Payer: COMMERCIAL

## 2022-05-19 ENCOUNTER — PES CALL (OUTPATIENT)
Dept: ADMINISTRATIVE | Facility: CLINIC | Age: 42
End: 2022-05-19
Payer: COMMERCIAL

## 2022-05-23 ENCOUNTER — PATIENT MESSAGE (OUTPATIENT)
Dept: SMOKING CESSATION | Facility: CLINIC | Age: 42
End: 2022-05-23
Payer: COMMERCIAL

## 2022-05-24 ENCOUNTER — TELEPHONE (OUTPATIENT)
Dept: PAIN MEDICINE | Facility: CLINIC | Age: 42
End: 2022-05-24

## 2022-05-27 ENCOUNTER — HOSPITAL ENCOUNTER (OUTPATIENT)
Facility: AMBULARY SURGERY CENTER | Age: 42
Discharge: HOME | End: 2022-05-27
Attending: ANESTHESIOLOGY | Admitting: ANESTHESIOLOGY
Payer: COMMERCIAL

## 2022-05-27 VITALS
HEART RATE: 82 BPM | BODY MASS INDEX: 45.89 KG/M2 | TEMPERATURE: 98 F | DIASTOLIC BLOOD PRESSURE: 78 MMHG | OXYGEN SATURATION: 96 % | RESPIRATION RATE: 20 BRPM | WEIGHT: 259 LBS | SYSTOLIC BLOOD PRESSURE: 127 MMHG | HEIGHT: 63 IN

## 2022-05-27 DIAGNOSIS — G89.29 CHRONIC HIP PAIN, LEFT: Primary | ICD-10-CM

## 2022-05-27 DIAGNOSIS — G89.4 CHRONIC PAIN SYNDROME: ICD-10-CM

## 2022-05-27 DIAGNOSIS — M25.552 CHRONIC LEFT HIP PAIN: ICD-10-CM

## 2022-05-27 DIAGNOSIS — M25.552 CHRONIC HIP PAIN, LEFT: Primary | ICD-10-CM

## 2022-05-27 DIAGNOSIS — G89.29 CHRONIC LEFT HIP PAIN: ICD-10-CM

## 2022-05-27 PROCEDURE — 20610 PR DRAIN/INJECT LARGE JOINT/BURSA: ICD-10-PCS | Mod: LT,,, | Performed by: ANESTHESIOLOGY

## 2022-05-27 PROCEDURE — 27096 INJECT SACROILIAC JOINT: CPT | Performed by: ANESTHESIOLOGY

## 2022-05-27 PROCEDURE — 20610 DRAIN/INJ JOINT/BURSA W/O US: CPT | Performed by: ANESTHESIOLOGY

## 2022-05-27 PROCEDURE — 27096 PR INJECTION,SACROILIAC JOINT: ICD-10-PCS | Mod: 59,LT,, | Performed by: ANESTHESIOLOGY

## 2022-05-27 PROCEDURE — 27095 INJECTION FOR HIP X-RAY: CPT | Performed by: ANESTHESIOLOGY

## 2022-05-27 PROCEDURE — 27096 INJECT SACROILIAC JOINT: CPT | Mod: 59,LT,, | Performed by: ANESTHESIOLOGY

## 2022-05-27 PROCEDURE — 20610 DRAIN/INJ JOINT/BURSA W/O US: CPT | Mod: LT,,, | Performed by: ANESTHESIOLOGY

## 2022-05-27 RX ORDER — METHYLPREDNISOLONE ACETATE 80 MG/ML
INJECTION, SUSPENSION INTRA-ARTICULAR; INTRALESIONAL; INTRAMUSCULAR; SOFT TISSUE
Status: DISCONTINUED | OUTPATIENT
Start: 2022-05-27 | End: 2022-05-27 | Stop reason: HOSPADM

## 2022-05-27 RX ORDER — FENTANYL CITRATE 50 UG/ML
INJECTION, SOLUTION INTRAMUSCULAR; INTRAVENOUS
Status: DISCONTINUED | OUTPATIENT
Start: 2022-05-27 | End: 2022-05-27 | Stop reason: HOSPADM

## 2022-05-27 RX ORDER — MIDAZOLAM HYDROCHLORIDE 1 MG/ML
INJECTION INTRAMUSCULAR; INTRAVENOUS
Status: DISCONTINUED | OUTPATIENT
Start: 2022-05-27 | End: 2022-05-27 | Stop reason: HOSPADM

## 2022-05-27 RX ORDER — BUPIVACAINE HYDROCHLORIDE 2.5 MG/ML
INJECTION, SOLUTION EPIDURAL; INFILTRATION; INTRACAUDAL
Status: DISCONTINUED | OUTPATIENT
Start: 2022-05-27 | End: 2022-05-27 | Stop reason: HOSPADM

## 2022-05-27 RX ORDER — SODIUM CHLORIDE, SODIUM LACTATE, POTASSIUM CHLORIDE, CALCIUM CHLORIDE 600; 310; 30; 20 MG/100ML; MG/100ML; MG/100ML; MG/100ML
INJECTION, SOLUTION INTRAVENOUS ONCE AS NEEDED
Status: COMPLETED | OUTPATIENT
Start: 2022-05-27 | End: 2022-05-27

## 2022-05-27 RX ORDER — LIDOCAINE HYDROCHLORIDE 10 MG/ML
INJECTION, SOLUTION EPIDURAL; INFILTRATION; INTRACAUDAL; PERINEURAL
Status: DISCONTINUED | OUTPATIENT
Start: 2022-05-27 | End: 2022-05-27 | Stop reason: HOSPADM

## 2022-05-27 RX ADMIN — SODIUM CHLORIDE, SODIUM LACTATE, POTASSIUM CHLORIDE, CALCIUM CHLORIDE: 600; 310; 30; 20 INJECTION, SOLUTION INTRAVENOUS at 09:05

## 2022-05-27 NOTE — OP NOTE
Procedure Date: 5/27/2022    PROCEDURE:  Left sacroiliac joint injection utilizing fluoroscopy.    DIAGNOSIS:   1.Left sided sacroiliitis.  2. Left greater trochanteric bursa injection  Post op diagnosis: same    PHYSICIAN: Yolie Lebron MD    MEDICATIONS INJECTED:  Methylprednisone 40 mg and 1.5ml Bupivacaine 0.25%.    LOCAL ANESTHETIC USED: Lidocaine 1%,3 ml total.     SEDATION MEDICATIONS: RN IV sedation    ESTIMATED BLOOD LOSS: none    COMPLICATIONS: none    TECHNIQUE:   Time-out taken to identify patient and procedure side prior to starting the procedure. After placing the patient in the prone position, the patient was prepped and draped in the usual sterile fashion using ChloraPrep and sterile towels.  The area was determined under fluoroscopy in the AP view.  Lidocaine was injected by raising a wheal and going down to the periosteum using a 25-gauge 1.5 inch needle.  The 3.5 inch 22-gauge spinal needle was introduced into inferior opening of the left sacroiliac joint.  Negative pressure applied to confirm no intravascular placement.  1 ml of contrast dye was injected to confirm placement and to confirm that there was no vascular uptake. The medication was then injected slowly. The patient tolerated the procedure well.    Left Greater Trochanteric Bursa Injection Under Fluoroscopic Guidance     All medications, allergies, and relevant histories were reviewed. No recent antibiotics or infections.  A time-out was taken to verify the correct patient, procedure, laterality, and appropriate medications/allergies.    After informed consent was taken patient is placed in prone position. Patient was prepped in sterile fashion. The left greater trochanter, femoral neck and femoral head were visualized under love fluoroscopy. A 27-gauge 1.5 inch needle local anesthetic was injected to make a skin wheal. Then a 22-gauge 3.5 inch spinal needle was introduced into the greater trochanter. 1 mL contrast was injected to  confirm correct placement. After negative aspiration, and no paresthesia, a 4 ml solution containing 0.25% bupivacaine and 40 mg of depo-medrol was intermittently injected. Patient tolerated procedure well with no complications.                                                                                                                             The patient was monitored after the procedure and was given post procedure and discharge instructions to follow at home. The patient was discharged in a stable condition.     The patient was monitored after the procedure.  The patient was given post procedure and discharge instructions to follow at home. The patient was discharged in a stable condition

## 2022-05-27 NOTE — PLAN OF CARE
Discharge instructions given to pt, verbalized understanding.  Tolerating PO fluids.  IV removed.  C/o pain 3/10.  Ambulating out to boyfriend  per RN in no distress.

## 2022-05-27 NOTE — DISCHARGE SUMMARY
[facility]  Discharge Note  Short Stay    Procedure(s) (LRB):  Injection, Joint Sacroiliac Left and GTB (Left)    OUTCOME: Patient tolerated treatment/procedure well without complication and is now ready for discharge.    DISPOSITION: Home or Self Care    FINAL DIAGNOSIS:    1. Sacroiliac joint dysfunction  2. Left greater trochanteric bursa injection    FOLLOWUP: In clinic    DISCHARGE INSTRUCTIONS:    Discharge Procedure Orders   Diet general     Call MD for:  temperature >100.4     Call MD for:  persistent nausea and vomiting     Call MD for:  severe uncontrolled pain     Call MD for:  difficulty breathing, headache or visual disturbances     Call MD for:  redness, tenderness, or signs of infection (pain, swelling, redness, odor or green/yellow discharge around incision site)     Call MD for:  hives     Call MD for:  persistent dizziness or light-headedness     Call MD for:  extreme fatigue        TIME SPENT ON DISCHARGE: 30 minutes

## 2022-05-31 ENCOUNTER — PATIENT MESSAGE (OUTPATIENT)
Dept: ADMINISTRATIVE | Facility: HOSPITAL | Age: 42
End: 2022-05-31
Payer: COMMERCIAL

## 2022-05-31 ENCOUNTER — PATIENT MESSAGE (OUTPATIENT)
Dept: PAIN MEDICINE | Facility: CLINIC | Age: 42
End: 2022-05-31
Payer: COMMERCIAL

## 2022-05-31 DIAGNOSIS — G89.4 CHRONIC PAIN DISORDER: ICD-10-CM

## 2022-05-31 RX ORDER — HYDROCODONE BITARTRATE AND ACETAMINOPHEN 7.5; 325 MG/1; MG/1
1 TABLET ORAL EVERY 6 HOURS PRN
Qty: 120 TABLET | Refills: 0 | Status: CANCELLED | OUTPATIENT
Start: 2022-05-31 | End: 2022-06-29

## 2022-05-31 NOTE — TELEPHONE ENCOUNTER
Can you please transfer her medication to [facility] in [place] . I have added the requested pharmacy. Thank you.

## 2022-06-01 RX ORDER — HYDROCODONE BITARTRATE AND ACETAMINOPHEN 7.5; 325 MG/1; MG/1
1 TABLET ORAL EVERY 6 HOURS PRN
Qty: 120 TABLET | Refills: 0 | Status: SHIPPED | OUTPATIENT
Start: 2022-06-01 | End: 2022-06-27 | Stop reason: SDUPTHER

## 2022-06-22 ENCOUNTER — TELEPHONE (OUTPATIENT)
Dept: PAIN MEDICINE | Facility: CLINIC | Age: 42
End: 2022-06-22
Payer: COMMERCIAL

## 2022-06-22 NOTE — TELEPHONE ENCOUNTER
Pt said she fell, went to  and they referred her to Ortho. I explained our office would not be able to address this with her. Advised to keep apt with Ortho and with Cathi on Monday. Also explained would not be able to address on a VV. Pt gave understanding.

## 2022-06-23 ENCOUNTER — PES CALL (OUTPATIENT)
Dept: ADMINISTRATIVE | Facility: CLINIC | Age: 42
End: 2022-06-23
Payer: COMMERCIAL

## 2022-06-27 ENCOUNTER — OFFICE VISIT (OUTPATIENT)
Dept: PAIN MEDICINE | Facility: CLINIC | Age: 42
End: 2022-06-27
Payer: COMMERCIAL

## 2022-06-27 DIAGNOSIS — G89.4 CHRONIC PAIN DISORDER: ICD-10-CM

## 2022-06-27 DIAGNOSIS — M70.62 GREATER TROCHANTERIC BURSITIS OF LEFT HIP: ICD-10-CM

## 2022-06-27 DIAGNOSIS — M54.16 LUMBAR RADICULOPATHY: ICD-10-CM

## 2022-06-27 DIAGNOSIS — M53.3 SACROILIAC JOINT PAIN: ICD-10-CM

## 2022-06-27 DIAGNOSIS — M51.36 DDD (DEGENERATIVE DISC DISEASE), LUMBAR: Primary | ICD-10-CM

## 2022-06-27 PROCEDURE — 1160F RVW MEDS BY RX/DR IN RCRD: CPT | Mod: CPTII,95,, | Performed by: PHYSICIAN ASSISTANT

## 2022-06-27 PROCEDURE — 4010F PR ACE/ARB THEARPY RXD/TAKEN: ICD-10-PCS | Mod: CPTII,95,, | Performed by: PHYSICIAN ASSISTANT

## 2022-06-27 PROCEDURE — 1160F PR REVIEW ALL MEDS BY PRESCRIBER/CLIN PHARMACIST DOCUMENTED: ICD-10-PCS | Mod: CPTII,95,, | Performed by: PHYSICIAN ASSISTANT

## 2022-06-27 PROCEDURE — 99214 OFFICE O/P EST MOD 30 MIN: CPT | Mod: 95,,, | Performed by: PHYSICIAN ASSISTANT

## 2022-06-27 PROCEDURE — 1159F PR MEDICATION LIST DOCUMENTED IN MEDICAL RECORD: ICD-10-PCS | Mod: CPTII,95,, | Performed by: PHYSICIAN ASSISTANT

## 2022-06-27 PROCEDURE — 99214 PR OFFICE/OUTPT VISIT, EST, LEVL IV, 30-39 MIN: ICD-10-PCS | Mod: 95,,, | Performed by: PHYSICIAN ASSISTANT

## 2022-06-27 PROCEDURE — 4010F ACE/ARB THERAPY RXD/TAKEN: CPT | Mod: CPTII,95,, | Performed by: PHYSICIAN ASSISTANT

## 2022-06-27 PROCEDURE — 1159F MED LIST DOCD IN RCRD: CPT | Mod: CPTII,95,, | Performed by: PHYSICIAN ASSISTANT

## 2022-06-27 RX ORDER — LAMOTRIGINE 150 MG/1
300 TABLET ORAL 2 TIMES DAILY
Qty: 360 TABLET | Refills: 1 | Status: SHIPPED | OUTPATIENT
Start: 2022-06-27 | End: 2023-02-20 | Stop reason: SDUPTHER

## 2022-06-27 RX ORDER — HYDROCODONE BITARTRATE AND ACETAMINOPHEN 7.5; 325 MG/1; MG/1
1 TABLET ORAL EVERY 6 HOURS PRN
Qty: 120 TABLET | Refills: 0 | Status: SHIPPED | OUTPATIENT
Start: 2022-06-30 | End: 2022-07-29

## 2022-06-27 RX ORDER — LEVETIRACETAM 750 MG/1
1500 TABLET ORAL 2 TIMES DAILY
Qty: 360 TABLET | Refills: 1 | Status: SHIPPED | OUTPATIENT
Start: 2022-06-27 | End: 2023-04-18 | Stop reason: SDUPTHER

## 2022-06-27 RX ORDER — LAMOTRIGINE 25 MG/1
25 TABLET ORAL 2 TIMES DAILY
Qty: 180 TABLET | Refills: 1 | Status: SHIPPED | OUTPATIENT
Start: 2022-06-27 | End: 2023-02-20 | Stop reason: SDUPTHER

## 2022-06-27 NOTE — PROGRESS NOTES
FOLLOW UP NOTE:     CHIEF COMPLAINT: neck, back, leg, and arm pain  Visit type: Virtual visit with synchronous audio and video  Total time spent with patient: 25 minutes  Each patient to whom he or she provides medical services by telemedicine is:  (1) informed of the relationship between the physician and patient and the respective role of any other health care provider with respect to management of the patient; and (2) notified that he or she may decline to receive medical services by telemedicine and may withdraw from such care at any time.    INITIAL HISTORY OF PRESENT ILLNESS: Annie Baird is a 41 y.o. female with PMH significant for seizure disorder s/p vagal nerve stimulator placement, HTN, hx of anxiety on chronic benzodiazepines, hx of left shoulder arthroscopic surgery (12/4/2012), hx of right CTS release (11/22/2013), hx of multiple CVA's (residual left sided weakness per patient and daughter report), narcolepsy, insomnia, and hx of C5-C6 ACDF (5/1/2019) presents as a referral for the evaluation of multiple pain complaints. The patient reports that her low back pain is the worst of her pains. The patient has had a rather extensive work-up in [place], and I have put the relevant diagnostic tests in my note below. In summary:     The patient reports that her pain began approximately 1 year ago when she experienced low back pain. The patient denies of any inciting incident or trauma. The patient reports of worsening pain over time. She reported of progressive symptoms that extended into her lower extremities. She eventually presented to the hospital for evaluation of possible cauda equina syndrome. CT myelogram results copied below. In regards to her pain: the patient localizes her pain to center of her lower back. The patient reports of radiation to her bilateral buttocks and down the posterior aspect of her RLE to her foot. The patient describes her pain as a sharp, stinging, and burning type  of pain. The patient reports of numbness in her left 2nd, 3rd, and 4th digits in her hand and her 1st and 2nd toes bilaterally. The patient reports that her pain is a 6/10. Patient denies of any fecal incontinence or saddle anesthesia. The patient reports of daily episodes of urinary incontinence which has been ongoing for the past few months. The patient reports of requiring the assistance of a wheelchair for ambulation for the past few months.      Aggravating factors: activity in general     Mitigating factors: laying on her left side     Relevant Surgeries: yes; history of cervical spine surgery X 1     Interventional Therapies: yes; Dr. Chun Pat in [place]. Dr. Pta did RFA's and epidurals. No benefit.     INTERVAL HISTORY OF PRESENT ILLNESS: Annie Baird is a 42 y.o. female with PMH significant for seizure disorder s/p vagal nerve stimulator placement, HTN, hx of anxiety on chronic benzodiazepines, hx of left shoulder arthroscopic surgery (12/4/2012), hx of right CTS release (11/22/2013), hx of multiple CVA's (residual left sided weakness per patient and daughter report), narcolepsy, insomnia, and hx of C5-C6 ACDF (5/1/2019) presents as an established patient for the continued management of chronic pain in multiple areas.She is s/p  Left SIJ and GTB with 60% improvement in symptoms. She is seeing [facility],  Dr. Byrne is treating her for Left rotator cuff tear s/p fall during a seizure.  Low back pain worsens with ambulation and stairs. Improves with rest but does worsen if she lays on her left side. The patient is s/p lumbar interlaminar epidural steroid injection at L5-S1 under fluoroscopic guidance (left paramedian approach) on 7/12/2021 with improvement for a few days. Today, the patient complains of pain in multiple locations which including her low back, mid back near her shoulder blades, neck, feet, and hands. The patient reports that activity in general worsens her pain. The  patient is tolerating her current pain regimen without adverse side effects. The patient denies of any significant changes in her health since her last appointment. The patient reports that her current pain is a 6/10. Divorce was finalized in April. Patient denies of any urinary/fecal incontinence, saddle anesthesia, or weakness.      INTERVENTIONAL PAIN HISTORY:  5/27/22 Left SIJ and GTB injection 60% relief   7/12/2021:  Lumbar interlaminar epidural steroid injection at L5-S1 under fluoroscopic guidance (left paramedian approach) - improvement for a few days    CURRENT PAIN MEDICATIONS:   gabapentin 600/600/900 mg PO TID  Norco 7.5- 325 mg PO q 6 hr  zofran 4 mg PO q 8 hr     Previously taking:   Ativan (once or twice a month)  temazepam 30 mg PO QHS (insomnia)        ROS:  Review of Systems   Constitutional: Negative for chills and fever.   HENT: Negative for sore throat.    Eyes: Negative for visual disturbance.   Respiratory: Negative for shortness of breath.    Cardiovascular: Negative for chest pain.   Gastrointestinal: Negative for nausea and vomiting.   Genitourinary: Negative for difficulty urinating.   Musculoskeletal: Positive for arthralgias, back pain, gait problem, myalgias and neck pain.   Skin: Negative for rash.   Allergic/Immunologic: Negative for immunocompromised state.   Neurological: Positive for seizures, weakness and numbness. Negative for syncope.   Hematological: Does not bruise/bleed easily.   Psychiatric/Behavioral: Negative for suicidal ideas.        MEDICAL, SURGICAL, FAMILY, SOCIAL HX: reviewed    MEDICATIONS/ALLERGIES: reviewed    PHYSICAL EXAM:    GENERAL: A and O x3, the patient appears well groomed and is in no acute distress.  Skin: No rashes or obvious lesions  HEENT: normocephalic, atraumatic  LUNGS: non labored breathing  ABDOMEN: non distended  UPPER EXTREMITIES and lower extremities. Sling on left arm  CERVICAL SPINE:  Cervical spine: ROM is full in flexion, extension and  lateral rotation without increased pain.    LUMBAR SPINE  Lumbar spine: ROM is limited with flexion extension and oblique extension with mild-to-moderate increased pain.      MENTAL STATUS: normal orientation, speech, language, and fund of knowledge for social situation.  Emotional state appropriate.    GAIT: antalgic gait; unsteady gait    IMAGING:    X-ray cervical spine (8/3/2021):  There is no detectable change in the appearance of the cervical spine compared to the prior study.  There are postsurgical changes of ACDF at the C5-6 level.  Hardware is intact.  Interbody fusion device is unchanged in appearance.  There is mild endplate osteophyte formation at the C4-5 level.  Vertebral body height and alignment are normal.  There is no fracture.  The odontoid process is intact.  The patient has a vagal stimulator.  Otherwise, the soft tissues are normal.     X-ray thoracic spine (7/23/2021):  The thoracic vertebral bodies maintain normal height and alignment without significant disc space narrowing. The posterior elements are intact. There is mild osteophyte formation at the T11-12 and T12-L1 levels. The paraspinous soft tissues are normal.    ASSESSMENT: Annie Baird is a 41 y.o. female with PMH significant for seizure disorder s/p vagal nerve stimulator placement, HTN, hx of anxiety on chronic benzodiazepines, hx of left shoulder arthroscopic surgery (12/4/2012), hx of right CTS release (11/22/2013), hx of multiple CVA's (residual left sided weakness per patient and daughter report), narcolepsy, insomnia, and hx of C5-C6 ACDF (5/1/2019) presents as an established patient for the continued management of chronic pain in multiple areas. Treatment plan outlined below.     PLAN:  1. Prescribed Norco 7.5-325 mg PO q 6 hr PRN for breakthrough pain; # 120 tablets; 0 refills.  reviewed without discrepancies. Previous UDS consistent   2. Continue gabapentin as prescribed for neuropathic pain. Discussed may  attribute to LE swelling.   3. Previous UDS tests positive for marijuana secondary to epidiolex which she takes for her seizure disorder  4. Continue f/u with orthopedics  5. Monitor progress from left SIJ and GTB injections.   6. F/u 1 month  All medication management performed by Dr. Lebron

## 2022-07-01 ENCOUNTER — OFFICE VISIT (OUTPATIENT)
Dept: HOME HEALTH SERVICES | Facility: CLINIC | Age: 42
End: 2022-07-01
Payer: COMMERCIAL

## 2022-07-01 VITALS
WEIGHT: 233.69 LBS | DIASTOLIC BLOOD PRESSURE: 90 MMHG | TEMPERATURE: 98 F | OXYGEN SATURATION: 98 % | HEIGHT: 63 IN | HEART RATE: 81 BPM | BODY MASS INDEX: 41.41 KG/M2 | SYSTOLIC BLOOD PRESSURE: 124 MMHG

## 2022-07-01 DIAGNOSIS — G40.909 SEIZURE DISORDER: ICD-10-CM

## 2022-07-01 DIAGNOSIS — E53.8 B12 DEFICIENCY: ICD-10-CM

## 2022-07-01 DIAGNOSIS — F33.1 DEPRESSION, MAJOR, RECURRENT, MODERATE: ICD-10-CM

## 2022-07-01 DIAGNOSIS — F17.200 NEEDS SMOKING CESSATION EDUCATION: ICD-10-CM

## 2022-07-01 DIAGNOSIS — G89.4 CHRONIC PAIN SYNDROME: ICD-10-CM

## 2022-07-01 DIAGNOSIS — E66.01 SEVERE OBESITY (BMI >= 40): ICD-10-CM

## 2022-07-01 DIAGNOSIS — I10 PRIMARY HYPERTENSION: ICD-10-CM

## 2022-07-01 DIAGNOSIS — K21.9 GASTROESOPHAGEAL REFLUX DISEASE, UNSPECIFIED WHETHER ESOPHAGITIS PRESENT: ICD-10-CM

## 2022-07-01 DIAGNOSIS — G40.812 NONINTRACTABLE LENNOX-GASTAUT SYNDROME WITHOUT STATUS EPILEPTICUS: ICD-10-CM

## 2022-07-01 DIAGNOSIS — M51.36 DEGENERATIVE DISC DISEASE, LUMBAR: ICD-10-CM

## 2022-07-01 DIAGNOSIS — J45.20 MILD INTERMITTENT ASTHMA WITHOUT COMPLICATION: ICD-10-CM

## 2022-07-01 DIAGNOSIS — Z00.00 ENCOUNTER FOR PREVENTIVE HEALTH EXAMINATION: Primary | ICD-10-CM

## 2022-07-01 DIAGNOSIS — F41.1 GAD (GENERALIZED ANXIETY DISORDER): ICD-10-CM

## 2022-07-01 DIAGNOSIS — M46.1 SACROILIITIS: ICD-10-CM

## 2022-07-01 DIAGNOSIS — Z96.89 S/P PLACEMENT OF VNS (VAGUS NERVE STIMULATION) DEVICE: ICD-10-CM

## 2022-07-01 DIAGNOSIS — I73.00 RAYNAUD'S PHENOMENON WITHOUT GANGRENE: ICD-10-CM

## 2022-07-01 DIAGNOSIS — F17.200 NICOTINE USE DISORDER: ICD-10-CM

## 2022-07-01 DIAGNOSIS — Z12.31 ENCOUNTER FOR SCREENING MAMMOGRAM FOR MALIGNANT NEOPLASM OF BREAST: ICD-10-CM

## 2022-07-01 DIAGNOSIS — M48.061 SPINAL STENOSIS OF LUMBAR REGION, UNSPECIFIED WHETHER NEUROGENIC CLAUDICATION PRESENT: ICD-10-CM

## 2022-07-01 DIAGNOSIS — I69.354 HEMIPARESIS AFFECTING LEFT SIDE AS LATE EFFECT OF STROKE: ICD-10-CM

## 2022-07-01 DIAGNOSIS — E78.2 MIXED HYPERLIPIDEMIA: ICD-10-CM

## 2022-07-01 DIAGNOSIS — M47.816 LUMBAR SPONDYLOSIS: ICD-10-CM

## 2022-07-01 DIAGNOSIS — J30.89 NON-SEASONAL ALLERGIC RHINITIS, UNSPECIFIED TRIGGER: ICD-10-CM

## 2022-07-01 DIAGNOSIS — N32.81 OVERACTIVE BLADDER: ICD-10-CM

## 2022-07-01 PROCEDURE — 99499 RISK ADDL DX/OHS AUDIT: ICD-10-PCS | Mod: S$GLB,,, | Performed by: NURSE PRACTITIONER

## 2022-07-01 PROCEDURE — G0439 PR MEDICARE ANNUAL WELLNESS SUBSEQUENT VISIT: ICD-10-PCS | Mod: S$GLB,,, | Performed by: NURSE PRACTITIONER

## 2022-07-01 PROCEDURE — 3080F PR MOST RECENT DIASTOLIC BLOOD PRESSURE >= 90 MM HG: ICD-10-PCS | Mod: CPTII,S$GLB,, | Performed by: NURSE PRACTITIONER

## 2022-07-01 PROCEDURE — 3074F SYST BP LT 130 MM HG: CPT | Mod: CPTII,S$GLB,, | Performed by: NURSE PRACTITIONER

## 2022-07-01 PROCEDURE — 3080F DIAST BP >= 90 MM HG: CPT | Mod: CPTII,S$GLB,, | Performed by: NURSE PRACTITIONER

## 2022-07-01 PROCEDURE — 3074F PR MOST RECENT SYSTOLIC BLOOD PRESSURE < 130 MM HG: ICD-10-PCS | Mod: CPTII,S$GLB,, | Performed by: NURSE PRACTITIONER

## 2022-07-01 PROCEDURE — G0439 PPPS, SUBSEQ VISIT: HCPCS | Mod: S$GLB,,, | Performed by: NURSE PRACTITIONER

## 2022-07-01 PROCEDURE — 99499 UNLISTED E&M SERVICE: CPT | Mod: S$GLB,,, | Performed by: NURSE PRACTITIONER

## 2022-07-01 RX ORDER — GABAPENTIN 300 MG/1
300 CAPSULE ORAL 3 TIMES DAILY
COMMUNITY
End: 2022-07-12

## 2022-07-01 RX ORDER — MAGNESIUM HYDROXIDE 400 MG/5ML
1 SUSPENSION, ORAL (FINAL DOSE FORM) ORAL DAILY
COMMUNITY

## 2022-07-01 NOTE — PATIENT INSTRUCTIONS
Counseling and Referral of Other Preventative  (Italic type indicates deductible and co-insurance are waived)    Patient Name: Annie Baird  Today's Date: 7/1/2022    Health Maintenance       Date Due Completion Date    Cervical Cancer Screening Never done ---    Pneumococcal Vaccines (Age 0-64) (1 - PCV) Never done ---    HIV Screening Never done ---    TETANUS VACCINE Never done ---    Sign Pain Contract Never done ---    High Dose Statin Never done ---    COVID-19 Vaccine (3 - Booster for Pfizer series) 01/08/2022 8/8/2021    Mammogram 05/07/2022 5/7/2021    Influenza Vaccine (1) 09/01/2022 ---    Urine Drug Screen 12/30/2022 12/30/2021        Orders Placed This Encounter   Procedures    Ambulatory referral/consult to Psychiatry     The following information is provided to all patients.  This information is to help you find resources for any of the problems found today that may be affecting your health:                Living healthy guide: [email or web address]      Understanding Diabetes: www.diabetes.org      Eating healthy: www.cdc.gov/healthyweight      CDC home safety checklist: www.cdc.gov/steadi/patient.html      Agency on Aging: www.goea.[place].[facility]      Alcoholics anonymous (AA): www.aa.org      Physical Activity: www.vance.nih.gov/ux2ydlh      Tobacco use: www.quitwithusla.org

## 2022-07-01 NOTE — PROGRESS NOTES
"Annie Baird presented for Medicare AW today. The following components were reviewed and updated:    · Medical history  · Family History  · Social history  · Allergies and Current Medications  · Health Risk Assessment  · Health Maintenance  · Care Team    **See Completed Assessments for Annual Wellness visit with in the encounter summary    The following assessments were completed:  · Depression Screening  · Cognitive function Screening        · Timed Get Up Test  · Whisper Test    Vitals:    07/01/22 0903   BP: (!) 124/90   Pulse: 81   Temp: 97.6 °F (36.4 °C)   TempSrc: Temporal   SpO2: 98%   Weight: 106 kg (233 lb 11.2 oz)   Height: 5' 3" (1.6 m)     Body mass index is 41.4 kg/m².   ]    Physical Exam  Vitals reviewed.   Constitutional:       Appearance: Normal appearance. She is obese.   HENT:      Head: Normocephalic and atraumatic.   Cardiovascular:      Rate and Rhythm: Normal rate and regular rhythm.      Pulses: Normal pulses.      Heart sounds: Normal heart sounds.   Pulmonary:      Effort: Pulmonary effort is normal.      Breath sounds: Normal breath sounds.   Musculoskeletal:         General: Normal range of motion.      Cervical back: Normal range of motion and neck supple.   Skin:     General: Skin is warm and dry.   Neurological:      Mental Status: She is alert and oriented to person, place, and time.   Psychiatric:         Mood and Affect: Mood normal.         Behavior: Behavior normal.      Comments: Fast speech          Outpatient Medications Marked as Taking for the 7/1/22 encounter (Office Visit) with SAMI Burton   Medication Sig Dispense Refill    acetaminophen (TYLENOL) 325 MG tablet Take 325 mg by mouth every 6 (six) hours as needed for Pain.      benztropine (COGENTIN) 1 MG tablet Take 1 mg by mouth once daily.       clotrimazole (LOTRIMIN) 1 % cream Apply topically 2 (two) times daily as needed.      cyclobenzaprine (FLEXERIL) 10 MG tablet as needed      EPIDIOLEX 100 " mg/mL GIVE 8.2 ML BY MOUTH 2 TIMES A DAY. 492 mL 4    fluticasone propionate (FLONASE) 50 mcg/actuation nasal spray 1 spray by Each Nostril route daily as needed.      gabapentin (NEURONTIN) 300 MG capsule Take 300 mg by mouth 3 (three) times daily.      gabapentin (NEURONTIN) 600 MG tablet Take 1 tablet (600 mg total) by mouth 3 (three) times daily. 90 tablet 0    HYDROcodone-acetaminophen (NORCO) 7.5-325 mg per tablet Take 1 tablet by mouth every 6 (six) hours as needed for Pain. 120 tablet 0    hydrOXYzine HCL (ATARAX) 25 MG tablet Take 1 tablet (25 mg total) by mouth daily 2 hours after breakfast. 90 tablet 1    lamoTRIgine (LAMICTAL) 150 MG Tab Take 2 tablets (300 mg total) by mouth 2 (two) times daily. Take with 25 mg tablets for dose of 325 mg  tablet 1    lamoTRIgine (LAMICTAL) 25 MG tablet Take 1 tablet (25 mg total) by mouth 2 (two) times daily. 180 tablet 1    levETIRAcetam (KEPPRA) 750 MG Tab Take 2 tablets (1,500 mg total) by mouth 2 (two) times daily. 360 tablet 1    LIDOcaine (LIDODERM) 5 % Place 1 patch onto the skin every 24 hours. Remove & Discard patch within 12 hours or as directed by MD      LORazepam (ATIVAN) 0.5 MG tablet Take 0.5 mg by mouth every 6 (six) hours as needed for Anxiety.      losartan (COZAAR) 50 MG tablet Take 1 tablet (50 mg total) by mouth every evening. 90 tablet 1    magnesium oxide (MAG-OX) 400 mg (241.3 mg magnesium) tablet Take 1 tablet by mouth once daily.      metoprolol succinate (TOPROL-XL) 50 MG 24 hr tablet Take 1 tablet (50 mg total) by mouth once daily. 90 tablet 1    midazolam 5 mg/spray (0.1 mL) Spry 1 spray by Nasal route daily as needed.      montelukast (SINGULAIR) 10 mg tablet Take 1 tablet (10 mg total) by mouth every evening. 90 tablet 1    multivitamin with minerals tablet Take 1 tablet by mouth once daily.      nortriptyline (PAMELOR) 25 MG capsule Take 25 mg by mouth once daily.      omeprazole (PRILOSEC) 40 MG capsule Take 1  capsule (40 mg total) by mouth every morning. 90 capsule 2    ondansetron (ZOFRAN) 4 MG tablet Take 8 mg by mouth every 8 (eight) hours as needed.       potassium gluconate 595 mg (99 mg) Tab Take 1 tablet by mouth once daily.      TENS UNITS MISC by Misc.(Non-Drug; Combo Route) route.      tolterodine (DETROL LA) 2 MG Cp24 Take 2 capsules (4 mg total) by mouth once daily. 180 capsule 0    vitamin D (VITAMIN D3) 1000 units Tab Take 1,000 Units by mouth once daily.          Diagnoses and health risks identified today and associated recommendations/orders:  1. Encounter for preventive health examination  Medicare awv complete. Health maintenance:  Mammogram and HIV screening ordered today and message sent to staff to call patient to schedule. Cervical cancer screening due-OB/GYN referral placed. Tetanus vaccine and covid 19 boosters due-encouraged patient to obtain. Pneumococcal vaccine-patient states she had this and patient to bring vaccine records. High dose statin recommended-f/u with pcp.   Patient states she will establish PCP in August with Dr. Méndez due to location change but has not seen him yet.   - Ambulatory referral/consult to Obstetrics / Gynecology; Future  - HIV 1 / 2 ANTIBODY; Future  - Mammo Digital Screening Bilat w/ Pasquale; Future    2. Depression, major, recurrent, moderate  New since divorce. Scored 12 on PHQ-9 indicating moderate depression. States it comes and goes. Patient denies any thoughts of suicide, harming self, or others. Patient agreed to treatment. Referral placed to psychiatry.   - Ambulatory referral/consult to Psychiatry; Future    3. Severe obesity (BMI >= 40)  This problem is currently not controlled. Instructed patient to diet and exercise to lose weight. Follow up with your PCP as planned to discuss adjustments to your treatment plan.      4. Seizure disorder  Seen by Dr. Haas in dec 2021. Chronic and stable on current management. See med list above. Follow up with  neurology. Referral placed to neurology nearby.  - Ambulatory referral/consult to Neurology; Future    5. Nonintractable Lennox-Gastaut syndrome without status epilepticus  Seen by Dr. Haas in dec 2021. Chronic and stable on current management. See med list above. Follow up with neurology. Referral placed to neurology nearby.  - Ambulatory referral/consult to Neurology; Future    6. Sacroiliitis  Chronic and stable on current management. See med list above. Follow up with PCP.      7. Hemiparesis affecting left side as late effect of stroke  Chronic. Slight. Fall precautions recommended. Follow up with PCP.      8. S/P placement of VNS (vagus nerve stimulation) device  Seen by Dr. Haas in dec 2021. Chronic and stable on current management. See med list above. Follow up with neurology. Patient states she needs a neurologist nearby since she moved.  Referral placed to neurology in [place].   - Ambulatory referral/consult to Neurology; Future    9. Degenerative disc disease, lumbar  Chronic and stable on current management. See med list above. Follow up with PCP.      10. Spinal stenosis of lumbar region, unspecified whether neurogenic claudication present  Chronic and stable on current management. See med list above. Follow up with PCP.      11. Lumbar spondylosis  Chronic and stable on current management. See med list above. Follow up with PCP.      12. Chronic pain syndrome  Chronic and stable on current management. See med list above. Follow up with PCP.      13. ALESSIA (generalized anxiety disorder)  Chronic and stable on current management. See med list above. Follow up with PCP.      14. Mild intermittent asthma without complication  Chronic and stable on current management. Off medications for a few months now. Follow up with PCP.      15. Non-seasonal allergic rhinitis, unspecified trigger  Chronic and stable on current management. See med list above. Follow up with PCP.      16. Nicotine use disorder  This  problem is currently not controlled. Smoking cessation program ordered previously and patient to call to schedule an appointment. Please follow up with your PCP as planned to discuss adjustments to your treatment plan.     17. Raynaud's phenomenon without gangrene  Chronic and stable on current management. See med list above. Follow up with PCP.      18. Primary hypertension  Chronic and stable on current management. See med list above. Recommend low sodium diet. Follow up with PCP.       19. Mixed hyperlipidemia  Lab Results   Component Value Date    CHOL 243 (H) 05/07/2021     Lab Results   Component Value Date    HDL 49 05/07/2021     Lab Results   Component Value Date    LDLCALC 153.2 05/07/2021     Lab Results   Component Value Date    TRIG 204 (H) 05/07/2021     Lab Results   Component Value Date    CHOLHDL 20.2 05/07/2021   High dose statin recommended-f/u with pcp.     20. Overactive bladder  Chronic and stable on current management. See med list above. Follow up with PCP.      21. B12 deficiency  Chronic and stable on current management. See med list above. Follow up with PCP.      22. Gastroesophageal reflux disease, unspecified whether esophagitis present  Chronic and stable on current management. See med list above. Follow up with PCP.          Provided Summer with a 5-10 year written screening schedule and personal prevention plan. Recommendations were developed using the USPSTF age appropriate recommendations. Education, counseling, and referrals were provided as needed.  After Visit Summary printed and given to patient which includes a list of additional screenings\tests needed.    Follow up in about 1 year (around 7/1/2023) for annual wellness visit.      SAMI Burton    I offered to discuss advanced care planning, including how to pick a person who would make decisions for you if you were unable to make them for yourself, called a health care power of , and what kind of decisions  you might make such as use of life sustaining treatments such as ventilators and tube feeding when faced with a life limiting illness recorded on a living will that they will need to know. (How you want to be cared for as you near the end of your natural life)     X Patient is interested in learning more about how to make advanced directives.  I provided them paperwork and offered to discuss this with them.

## 2022-07-01 NOTE — Clinical Note
Medicare awv complete. Health maintenance:  Mammogram and HIV screening ordered today and message sent to staff to call patient to schedule. Cervical cancer screening due-OB/GYN referral placed. Tetanus vaccine and covid 19 boosters due-encouraged patient to obtain. Pneumococcal vaccine-patient states she had this and patient to bring vaccine records. High dose statin recommended-f/u with pcp.  Patient states she will establish PCP in August with Dr. Méndez due to location change but has not seen him yet.   2. Depression, major, recurrent, moderate New since divorce. Scored 12 on PHQ-9 indicating moderate depression. States it comes and goes. Patient denies any thoughts of suicide, harming self, or others. Patient agreed to treatment. Referral placed to psychiatry.   Patient states she needs a neurologist nearby since she moved.  Referral placed to neurology in [place].

## 2022-07-05 ENCOUNTER — TELEPHONE (OUTPATIENT)
Dept: ADMINISTRATIVE | Facility: CLINIC | Age: 42
End: 2022-07-05
Payer: COMMERCIAL

## 2022-07-05 NOTE — TELEPHONE ENCOUNTER
----- Message from SAMI Burton sent at 7/1/2022 10:03 AM CDT -----  Hi,   Please call patient to schedule her mammogram and HIV screening. Also needs to schedule with smoking cessation program if you have their direct number or are able to send them a message-Was ordered in 2021.  Thanks

## 2022-07-05 NOTE — TELEPHONE ENCOUNTER
Called pt; no answer; left message informing pt I was calling to schedule her some appointments per provider's message and to return my call to get appointments scheduled

## 2022-07-06 ENCOUNTER — TELEPHONE (OUTPATIENT)
Dept: ADMINISTRATIVE | Facility: CLINIC | Age: 42
End: 2022-07-06
Payer: COMMERCIAL

## 2022-07-06 NOTE — TELEPHONE ENCOUNTER
Called pt; no answer; left message informing pt I was calling to schedule her some appointments per provider's message and to return my call to get appointments scheduled; left my name and number in the message but also left scheduling number as well

## 2022-07-07 ENCOUNTER — TELEPHONE (OUTPATIENT)
Dept: ADMINISTRATIVE | Facility: CLINIC | Age: 42
End: 2022-07-07
Payer: COMMERCIAL

## 2022-07-07 ENCOUNTER — TELEPHONE (OUTPATIENT)
Dept: PAIN MEDICINE | Facility: CLINIC | Age: 42
End: 2022-07-07
Payer: COMMERCIAL

## 2022-07-07 NOTE — TELEPHONE ENCOUNTER
----- Message from Dash Munguia sent at 7/7/2022  9:24 AM CDT -----  Type: Needs Medical Advice  Who Called:  Pt    Best Call Back Number: [phone]  Additional Information: Sts she wants to know how often she can do the video appts due to the drive over and she wants to stick with Dr Rossi.  Would like a call back.  Please advise -- Thank you

## 2022-07-07 NOTE — TELEPHONE ENCOUNTER
She needs an in clinic appt due to UDS. After this visit we can make a 3 month f/u so it is not so inconvenient

## 2022-07-08 ENCOUNTER — TELEPHONE (OUTPATIENT)
Dept: ADMINISTRATIVE | Facility: CLINIC | Age: 42
End: 2022-07-08
Payer: COMMERCIAL

## 2022-07-12 ENCOUNTER — PATIENT MESSAGE (OUTPATIENT)
Dept: FAMILY MEDICINE | Facility: CLINIC | Age: 42
End: 2022-07-12
Payer: COMMERCIAL

## 2022-07-12 ENCOUNTER — PATIENT MESSAGE (OUTPATIENT)
Dept: PAIN MEDICINE | Facility: CLINIC | Age: 42
End: 2022-07-12
Payer: COMMERCIAL

## 2022-07-12 ENCOUNTER — PATIENT MESSAGE (OUTPATIENT)
Dept: NEUROLOGY | Facility: CLINIC | Age: 42
End: 2022-07-12
Payer: COMMERCIAL

## 2022-07-12 DIAGNOSIS — M54.50 CHRONIC MIDLINE LOW BACK PAIN, UNSPECIFIED WHETHER SCIATICA PRESENT: ICD-10-CM

## 2022-07-12 DIAGNOSIS — M51.36 DDD (DEGENERATIVE DISC DISEASE), LUMBAR: Primary | ICD-10-CM

## 2022-07-12 DIAGNOSIS — G89.29 CHRONIC MIDLINE LOW BACK PAIN, UNSPECIFIED WHETHER SCIATICA PRESENT: ICD-10-CM

## 2022-07-12 DIAGNOSIS — M53.3 SACROILIAC JOINT PAIN: ICD-10-CM

## 2022-07-12 DIAGNOSIS — M70.62 GREATER TROCHANTERIC BURSITIS OF LEFT HIP: ICD-10-CM

## 2022-07-12 RX ORDER — GABAPENTIN 600 MG/1
600 TABLET ORAL 3 TIMES DAILY
Qty: 270 TABLET | Refills: 1 | Status: SHIPPED | OUTPATIENT
Start: 2022-07-12 | End: 2022-10-13 | Stop reason: SDUPTHER

## 2022-07-12 RX ORDER — GABAPENTIN 600 MG/1
600 TABLET ORAL 3 TIMES DAILY
Qty: 270 TABLET | Refills: 0 | Status: SHIPPED | OUTPATIENT
Start: 2022-07-12 | End: 2022-07-12 | Stop reason: SDUPTHER

## 2022-07-12 NOTE — TELEPHONE ENCOUNTER
Spoke with patient and informed Dr. Haas is willing to do virtual appointments with her. Patient agrees to that since transportation is her only issue. Scheduled for a virtual on 7/14/22 with Dr. Haas

## 2022-07-12 NOTE — TELEPHONE ENCOUNTER
No new care gaps identified.  [facility] Embedded Care Gaps. Reference number: [id number]. 7/12/2022   10:15:06 AM RIKIT

## 2022-07-13 ENCOUNTER — DOCUMENTATION ONLY (OUTPATIENT)
Dept: PAIN MEDICINE | Facility: CLINIC | Age: 42
End: 2022-07-13
Payer: COMMERCIAL

## 2022-07-14 ENCOUNTER — PATIENT MESSAGE (OUTPATIENT)
Dept: ADMINISTRATIVE | Facility: OTHER | Age: 42
End: 2022-07-14
Payer: COMMERCIAL

## 2022-07-14 ENCOUNTER — OFFICE VISIT (OUTPATIENT)
Dept: NEUROLOGY | Facility: CLINIC | Age: 42
End: 2022-07-14
Payer: COMMERCIAL

## 2022-07-14 DIAGNOSIS — Z96.89 S/P PLACEMENT OF VNS (VAGUS NERVE STIMULATION) DEVICE: ICD-10-CM

## 2022-07-14 DIAGNOSIS — G40.909 SEIZURE DISORDER: Primary | ICD-10-CM

## 2022-07-14 PROCEDURE — 4010F ACE/ARB THERAPY RXD/TAKEN: CPT | Mod: CPTII,95,, | Performed by: PSYCHIATRY & NEUROLOGY

## 2022-07-14 PROCEDURE — 99215 OFFICE O/P EST HI 40 MIN: CPT | Mod: 95,,, | Performed by: PSYCHIATRY & NEUROLOGY

## 2022-07-14 PROCEDURE — 1159F PR MEDICATION LIST DOCUMENTED IN MEDICAL RECORD: ICD-10-PCS | Mod: CPTII,95,, | Performed by: PSYCHIATRY & NEUROLOGY

## 2022-07-14 PROCEDURE — 4010F PR ACE/ARB THEARPY RXD/TAKEN: ICD-10-PCS | Mod: CPTII,95,, | Performed by: PSYCHIATRY & NEUROLOGY

## 2022-07-14 PROCEDURE — 99215 PR OFFICE/OUTPT VISIT, EST, LEVL V, 40-54 MIN: ICD-10-PCS | Mod: 95,,, | Performed by: PSYCHIATRY & NEUROLOGY

## 2022-07-14 PROCEDURE — 1159F MED LIST DOCD IN RCRD: CPT | Mod: CPTII,95,, | Performed by: PSYCHIATRY & NEUROLOGY

## 2022-07-14 PROCEDURE — 1160F RVW MEDS BY RX/DR IN RCRD: CPT | Mod: CPTII,95,, | Performed by: PSYCHIATRY & NEUROLOGY

## 2022-07-14 PROCEDURE — 1160F PR REVIEW ALL MEDS BY PRESCRIBER/CLIN PHARMACIST DOCUMENTED: ICD-10-PCS | Mod: CPTII,95,, | Performed by: PSYCHIATRY & NEUROLOGY

## 2022-07-14 NOTE — PROGRESS NOTES
"  The patient location is: [place]  The chief complaint leading to consultation is: seizures    Visit type: audiovisual    Face to Face time with patient: 10 minutes  40 minutes of total time spent on the encounter, which includes face to face time and non-face to face time preparing to see the patient (eg, review of tests), Obtaining and/or reviewing separately obtained history, Documenting clinical information in the electronic or other health record, Independently interpreting results (not separately reported) and communicating results to the patient/family/caregiver, or Care coordination (not separately reported).     Each patient to whom he or she provides medical services by telemedicine is:  (1) informed of the relationship between the physician and patient and the respective role of any other health care provider with respect to management of the patient; and (2) notified that he or she may decline to receive medical services by telemedicine and may withdraw from such care at any time.    Notes:       Date: 7/14/2022    Patient ID: Annie Baird is a 42 y.o. female.    Chief Complaint: No chief complaint on file.      History of Present Illness:  Ms. Baird is a 42 y.o. female who presents for followup of seizures.     Interval history: Since our last visit she has had 5 GTCs seizures but she notes she was stressed out. She notes her anxiety has been a lot higher. She has also had 7 focal seizures.     Seizure history: Seizures started at age 11. These were GTCs. They went away in her early 20s and then they came back "with a vengence" in her late 20s. She then started having different types of seizures too. They were very severe having up to 47 seizures in a week.      She has 3 types of seizures:  1) GTC - Sometimes she has a weird copper taste and feels "weird and funny". Whole body convulsions. She will be combative afterwards. She has some incontinence with these. Had one a couple of weeks " "ago. On average these happen once a month or every 2 months.   2) "dumb moment seizure" - No aura with these. She will stare off in space and then comes back to and knows that something happened. One time she walked into the store but didn't remember walking. Had one yesterday. Since her VNS has been off, she has had more of these. She is having them every other day.   3) "pseudoseizures" - Stress triggers. Looks like a grand mal but different. This was diagnosed by EEG monitoring. Maybe these happen every few months. Her younger daughter can tell the difference between a real GTC and pseudoseizures.      She has had two big strokes and "multiple" mini strokes. She notes she has white matter changes in the brain. They have a strong family history of strokes and heart attacks. 2 aunts on her mother's side had epilepsy (but one was due to a tumor).      Current AEDs:  Cannabidiol (Epidiolex) 6mg/kg BID  Gabapentin 600 mg TID  Lamictal 325 mg BID  keppra 1500 mg BID (previously was on higher dose and caused side effects) - lower doses had seizures.      VNS placed originally 5-6 years ago about. On June 14, 2021, she had to have revision of the wires and put a new battery pack there.      She has been told she has LGS. She had special education in school. She got her GED.      Other AEDs tried:  Dilantin - allergic  Phenobarbital  Tegretol  valproic acid (Depakote, VPA) - allergic     AEDs not tried:  acetazolamide (Diamox, AZM)  amantadine  brivaracetam (Briviact)  clobazam (Onfi or Frizium, CLB)  cenobamate (Xcopri)  ethosuximide (Zarontin, ESM)  eslicarbazine (Aptiom, ESL)  felbamate (felbatol, FBM)  lacosamide (Vimpat, LCS)   methsuximide (Celontin, MSM)  methyphenytoin (Mesantion, MHT)  oxcarbazepine (Trileptal OXC)  perampanel (Fycompa, FCP)   pregabalin (Lyrica, PGB)  primidone (Mysoline, PRM)  retigabine (Potiga, RTG)  rufinamide (Banzel, RUF)  tiagabine (Gabatril,  TGB)  topiramate (Topamax, TPM)  viagabatrin, " (Sabril, VGB)  zonisamide (Zonegran, ZNA)    VNS settings (adjusted on 12/13/21)  Output current (mA):               1  Signal frequency (Hz):            15  Pulse width (µs):                     130  Signal on time (s):                   30  Signal off time (min):               1.8     Autostim current (mA):            1.125  Autostim pulse width (µs):      130  Autostim on time (s):               30     Magnet current (mA):              1.25  Magnet pulse width (µs):        250  Magnet on time (s):                 60       Allergies:  Review of patient's allergies indicates:   Allergen Reactions    Depakote [divalproex] Hives    Dilantin [phenytoin sodium extended] Hives       Current Medications:  Current Outpatient Medications   Medication Sig Dispense Refill    acetaminophen (TYLENOL) 325 MG tablet Take 325 mg by mouth every 6 (six) hours as needed for Pain.      benztropine (COGENTIN) 1 MG tablet Take 1 mg by mouth once daily.       clotrimazole (LOTRIMIN) 1 % cream Apply topically 2 (two) times daily as needed.      cyclobenzaprine (FLEXERIL) 10 MG tablet as needed      EPIDIOLEX 100 mg/mL GIVE 8.2 ML BY MOUTH 2 TIMES A DAY. 492 mL 4    fluticasone propionate (FLONASE) 50 mcg/actuation nasal spray 1 spray by Each Nostril route daily as needed.      gabapentin (NEURONTIN) 600 MG tablet Take 1 tablet (600 mg total) by mouth 3 (three) times daily. 270 tablet 1    HYDROcodone-acetaminophen (NORCO) 7.5-325 mg per tablet Take 1 tablet by mouth every 6 (six) hours as needed for Pain. 120 tablet 0    hydrOXYzine HCL (ATARAX) 25 MG tablet Take 1 tablet (25 mg total) by mouth daily 2 hours after breakfast. 90 tablet 1    lamoTRIgine (LAMICTAL) 150 MG Tab Take 2 tablets (300 mg total) by mouth 2 (two) times daily. Take with 25 mg tablets for dose of 325 mg  tablet 1    lamoTRIgine (LAMICTAL) 25 MG tablet Take 1 tablet (25 mg total) by mouth 2 (two) times daily. 180 tablet 1    levETIRAcetam (KEPPRA)  750 MG Tab Take 2 tablets (1,500 mg total) by mouth 2 (two) times daily. 360 tablet 1    LIDOcaine (LIDODERM) 5 % Place 1 patch onto the skin every 24 hours. Remove & Discard patch within 12 hours or as directed by MD      LORazepam (ATIVAN) 0.5 MG tablet Take 0.5 mg by mouth every 6 (six) hours as needed for Anxiety.      losartan (COZAAR) 50 MG tablet Take 1 tablet (50 mg total) by mouth every evening. 90 tablet 1    magnesium oxide (MAG-OX) 400 mg (241.3 mg magnesium) tablet Take 1 tablet by mouth once daily.      metoprolol succinate (TOPROL-XL) 50 MG 24 hr tablet Take 1 tablet (50 mg total) by mouth once daily. 90 tablet 1    midazolam 5 mg/spray (0.1 mL) Spry 1 spray by Nasal route daily as needed.      montelukast (SINGULAIR) 10 mg tablet Take 1 tablet (10 mg total) by mouth every evening. 90 tablet 1    multivitamin with minerals tablet Take 1 tablet by mouth once daily.      nortriptyline (PAMELOR) 25 MG capsule Take 25 mg by mouth once daily.      omeprazole (PRILOSEC) 40 MG capsule Take 1 capsule (40 mg total) by mouth every morning. 90 capsule 2    ondansetron (ZOFRAN) 4 MG tablet Take 8 mg by mouth every 8 (eight) hours as needed.       potassium gluconate 595 mg (99 mg) Tab Take 1 tablet by mouth once daily.      TENS UNITS MISC by Misc.(Non-Drug; Combo Route) route.      tolterodine (DETROL LA) 2 MG Cp24 Take 2 capsules (4 mg total) by mouth once daily. 180 capsule 0    vitamin D (VITAMIN D3) 1000 units Tab Take 1,000 Units by mouth once daily.       No current facility-administered medications for this visit.       Past Medical History:  Past Medical History:   Diagnosis Date    Asthma     GERD (gastroesophageal reflux disease)     Hypertension     Seizures     Traumatic tear of left rotator cuff        Past Surgical History:  Past Surgical History:   Procedure Laterality Date    APPENDECTOMY       SECTION      EPIDURAL STEROID INJECTION INTO LUMBAR SPINE N/A 2021     Procedure: Injection-steroid-epidural-lumbar;  Surgeon: Yolie Lebron MD;  [facility];  Service: Pain Management;  Laterality: N/A;  L5-S1     INJECTION OF JOINT Left 5/27/2022    Procedure: Injection, Joint Sacroiliac Left and GTB;  Surgeon: Yolie Lebron MD;  Location: [facility];  Service: Pain Management;  Laterality: Left;    JOINT REPLACEMENT      VAGAL NERVE STIMULATOR placement Left 06/2021       Family History:  family history includes COPD in her father and mother; Cancer in her father; Heart disease in her mother; Hyperlipidemia in her mother; Hypertension in her mother; Stroke in her mother.    Social History:   reports that she has been smoking vaping with nicotine. She has a 30.00 pack-year smoking history. She has never used smokeless tobacco. She reports previous alcohol use. She reports that she does not use drugs.    Physical Exam:  There were no vitals filed for this visit.  There is no height or weight on file to calculate BMI.    Neurological Exam:  Mental status: Awake and alert  Speech language: No dysarthria or aphasia on conversation  Cranial nerves: Face symmetric  Motor: Moves all extremities well  Coordination: No ataxia. No tremor.      Data:  I have personally reviewed other provider's notes, labs, & imaging made available to me today.     Labs:  CBC:   Lab Results   Component Value Date    WBC 9.23 05/07/2021    HGB 12.2 05/07/2021    HCT 37.6 05/07/2021     05/07/2021    MCV 91 05/07/2021    RDW 12.4 05/07/2021     BMP:   Lab Results   Component Value Date     11/29/2021    K 3.9 11/29/2021     11/29/2021    CO2 24 11/29/2021    BUN 7 11/29/2021    CREATININE 0.8 11/29/2021     (H) 11/29/2021    CALCIUM 9.0 11/29/2021    MG 1.8 05/07/2021     LFTS;   Lab Results   Component Value Date    PROT 7.4 11/29/2021    ALBUMIN 4.0 11/29/2021    BILITOT 0.4 11/29/2021    AST 20 11/29/2021    ALKPHOS 77 11/29/2021    ALT 17 11/29/2021     COAGS:   Lab  Results   Component Value Date    INR 1.0 06/03/2021     FLP:   Lab Results   Component Value Date    CHOL 243 (H) 05/07/2021    HDL 49 05/07/2021    LDLCALC 153.2 05/07/2021    TRIG 204 (H) 05/07/2021    CHOLHDL 20.2 05/07/2021       Assessment and Plan:  Ms. Baird is a 42 y.o. female here for followup of seizures. She has continued to have seizures per her report. She is not present for me to adjust VNS and she states she has moved to [place] and cannot come over here and [facility].     She did not get bloodwork in September. We will order trough levels to see if we can adjust lamictal. She did not tolerate higher keppra doses in the past.     Seizure disorder  -     Cancel: Levetiracetam Level; Future; Expected date: 07/15/2022  -     Cancel: Lamotrigine Level; Future; Expected date: 07/15/2022  -     Cancel: Comprehensive Metabolic Panel; Future; Expected date: 07/14/2022  -     Cancel: CBC Auto Differential; Future; Expected date: 07/14/2022  -     Ambulatory referral/consult to Neurology; Future; Expected date: 07/21/2022  -     CBC Auto Differential; Future; Expected date: 07/14/2022  -     Comprehensive Metabolic Panel; Future; Expected date: 07/14/2022  -     Lamotrigine Level; Future; Expected date: 07/15/2022  -     Levetiracetam Level; Future; Expected date: 07/15/2022    S/P placement of VNS (vagus nerve stimulation) device  -     Cancel: Levetiracetam Level; Future; Expected date: 07/15/2022  -     Cancel: Lamotrigine Level; Future; Expected date: 07/15/2022  -     Cancel: Comprehensive Metabolic Panel; Future; Expected date: 07/14/2022  -     Cancel: CBC Auto Differential; Future; Expected date: 07/14/2022  -     Ambulatory referral/consult to Neurology; Future; Expected date: 07/21/2022  -     CBC Auto Differential; Future; Expected date: 07/14/2022  -     Comprehensive Metabolic Panel; Future; Expected date: 07/14/2022  -     Lamotrigine Level; Future; Expected  date: 07/15/2022  -     Levetiracetam Level; Future; Expected date: 07/15/2022         I spent a total of 40 minutes on the day of the visit.This includes face to face time and non-face to face time preparing to see the patient (eg, review of tests), Obtaining and/or reviewing separately obtained history, Documenting clinical information in the electronic or other health record, Independently interpreting results and communicating results to the patient/family/caregiver, or Care coordination.

## 2022-07-15 ENCOUNTER — TELEPHONE (OUTPATIENT)
Dept: PAIN MEDICINE | Facility: CLINIC | Age: 42
End: 2022-07-15
Payer: COMMERCIAL

## 2022-07-15 NOTE — TELEPHONE ENCOUNTER
----- Message from Phoebe Loredo sent at 7/15/2022  2:06 PM CDT -----  Regarding: referral  Contact: patient  Type: Needs Medical Advice  Who Called:  patient  Symptoms (please be specific):    How long has patient had these symptoms:    Pharmacy name and phone #:    Best Call Back Number: [phone]  Additional Information: Patient states [facility] never received the the referral and is asking if it can be resent to [phone]. Please call patient to advise.Thanks!

## 2022-07-15 NOTE — TELEPHONE ENCOUNTER
----- Message from Marika Manning sent at 7/15/2022 10:29 AM CDT -----  Contact: Patient  Type:  Needs Medical Advice    Who Called:  Patient      Would the patient rather a call back or a response via [facility]?  Call    Best Call Back Number:  [phone]      Additional Information:  Patient is checking the status of the referral the office was putting in the system for Dr Darby  pain management in [place], patient states the referral has not been sent     Dr Darby phone number is [phone]        Please call to advise

## 2022-07-18 ENCOUNTER — TELEPHONE (OUTPATIENT)
Dept: PAIN MEDICINE | Facility: CLINIC | Age: 42
End: 2022-07-18
Payer: COMMERCIAL

## 2022-07-18 ENCOUNTER — TELEPHONE (OUTPATIENT)
Dept: NEUROLOGY | Facility: CLINIC | Age: 42
End: 2022-07-18
Payer: COMMERCIAL

## 2022-07-18 NOTE — TELEPHONE ENCOUNTER
----- Message from Phoebe Loredo sent at 7/18/2022 11:54 AM CDT -----  Regarding: advise  Contact: patient  Type: Needs Medical Advice  Who Called:  patient  Symptoms (please be specific):    How long has patient had these symptoms:    Pharmacy name and phone #:    Best Call Back Number:[phone]  Additional Information: Patient is calling regarding her VNS. Patient states it is working but once the wire was replace for a longer on one of the wire sticking away from the others. It is causing a little discomfort. Please call patient to advise.Thanks!

## 2022-07-18 NOTE — TELEPHONE ENCOUNTER
Returned call to patient who reports that the physician she has been referred to in neurology has contacted her to schedule and does not have any further needs to be addressed at this time.

## 2022-07-18 NOTE — TELEPHONE ENCOUNTER
----- Message from Leopoldo Conway sent at 7/18/2022  1:57 PM CDT -----  Regarding: ret call  Type:  Patient Returning Call    Who Called:  Annie    Who Left Message for Patient:  Celine    Does the patient know what this is regarding?:  yes    Best Call Back Number: [phone]    Additional Information:

## 2022-07-18 NOTE — TELEPHONE ENCOUNTER
Patient states her wire for her VNS is starting to irritate her and cause pain. Advised patient to contact Dr. Corona and provided phone number. Patient voiced understanding.

## 2022-07-18 NOTE — TELEPHONE ENCOUNTER
----- Message from Teresa Donovan sent at 7/18/2022  2:16 PM CDT -----  Name of Who is Calling: MARGARET COOMBS [[id number]]           What is the request in detail: patient requesting call back for referral for neuro center in [place] and exposed wire concern           Can the clinic reply by [facility]: no           [facility]: [phone]

## 2022-07-19 DIAGNOSIS — Z96.89 S/P PLACEMENT OF VNS (VAGUS NERVE STIMULATION) DEVICE: Primary | ICD-10-CM

## 2022-07-20 ENCOUNTER — TELEPHONE (OUTPATIENT)
Dept: NEUROSURGERY | Facility: CLINIC | Age: 42
End: 2022-07-20
Payer: COMMERCIAL

## 2022-07-20 DIAGNOSIS — Z96.89 S/P PLACEMENT OF VNS (VAGUS NERVE STIMULATION) DEVICE: Primary | ICD-10-CM

## 2022-07-20 NOTE — TELEPHONE ENCOUNTER
Called patient, no answer - left VM for pt informing her that there are no neurosurgeons at [facility] in [place] that see pts for VNS. I explained that other surgeons would just refer her back to Dr. Corona if she needs a revision or any further surgery. I told her the plan moving forward is to have her get xrays in [place], then have a virtual visit with Dr. Corona to discuss and go from there. This would temporarily solve the transportation issue. I told pt I will wait to hear back from her so we can confirm and get those appointments scheduled. I said I will wait to hear back from her. I provided a call back number and encouraged pt to message through portal.

## 2022-07-21 ENCOUNTER — TELEPHONE (OUTPATIENT)
Dept: NEUROSURGERY | Facility: CLINIC | Age: 42
End: 2022-07-21
Payer: COMMERCIAL

## 2022-07-22 ENCOUNTER — HOSPITAL ENCOUNTER (OUTPATIENT)
Dept: RADIOLOGY | Facility: HOSPITAL | Age: 42
Discharge: HOME | End: 2022-07-22
Attending: NEUROLOGICAL SURGERY
Payer: COMMERCIAL

## 2022-07-22 DIAGNOSIS — Z96.89 S/P PLACEMENT OF VNS (VAGUS NERVE STIMULATION) DEVICE: ICD-10-CM

## 2022-07-22 PROCEDURE — 71046 X-RAY EXAM CHEST 2 VIEWS: CPT | Mod: 26,,, | Performed by: RADIOLOGY

## 2022-07-22 PROCEDURE — 71046 XR CHEST PA AND LATERAL: ICD-10-PCS | Mod: 26,,, | Performed by: RADIOLOGY

## 2022-07-22 PROCEDURE — 71046 X-RAY EXAM CHEST 2 VIEWS: CPT | Mod: TC

## 2022-07-26 ENCOUNTER — TELEPHONE (OUTPATIENT)
Dept: NEUROSURGERY | Facility: CLINIC | Age: 42
End: 2022-07-26

## 2022-07-26 ENCOUNTER — TELEPHONE (OUTPATIENT)
Dept: NEUROSURGERY | Facility: CLINIC | Age: 42
End: 2022-07-26
Payer: COMMERCIAL

## 2022-07-26 ENCOUNTER — OFFICE VISIT (OUTPATIENT)
Dept: NEUROSURGERY | Facility: CLINIC | Age: 42
End: 2022-07-26
Payer: COMMERCIAL

## 2022-07-26 DIAGNOSIS — Z96.89 S/P PLACEMENT OF VNS (VAGUS NERVE STIMULATION) DEVICE: Primary | ICD-10-CM

## 2022-07-26 PROCEDURE — 4010F ACE/ARB THERAPY RXD/TAKEN: CPT | Mod: CPTII,95,, | Performed by: NEUROLOGICAL SURGERY

## 2022-07-26 PROCEDURE — 99213 PR OFFICE/OUTPT VISIT, EST, LEVL III, 20-29 MIN: ICD-10-PCS | Mod: 95,,, | Performed by: NEUROLOGICAL SURGERY

## 2022-07-26 PROCEDURE — 99213 OFFICE O/P EST LOW 20 MIN: CPT | Mod: 95,,, | Performed by: NEUROLOGICAL SURGERY

## 2022-07-26 PROCEDURE — 4010F PR ACE/ARB THEARPY RXD/TAKEN: ICD-10-PCS | Mod: CPTII,95,, | Performed by: NEUROLOGICAL SURGERY

## 2022-07-26 NOTE — PROGRESS NOTES
Neurosurgery  Established Patient    SUBJECTIVE:     History of Present Illness:  Patient comes back to see me follow-up for her last revision of her vagal nerve stimulator in June of 2021. We did 8 lead and a pulse generator revision.  She did well for a while but starting 1 month ago she started having feeling of pulling on the lead and now she feels that this possible disconnection or wire backing out around the pulse generator.  Is difficult to fully evaluate this on a virtual visit.      Review of patient's allergies indicates:   Allergen Reactions    Depakote [divalproex] Hives    Dilantin [phenytoin sodium extended] Hives       Current Outpatient Medications   Medication Sig Dispense Refill    acetaminophen (TYLENOL) 325 MG tablet Take 325 mg by mouth every 6 (six) hours as needed for Pain.      benztropine (COGENTIN) 1 MG tablet Take 1 mg by mouth once daily.       clotrimazole (LOTRIMIN) 1 % cream Apply topically 2 (two) times daily as needed.      cyclobenzaprine (FLEXERIL) 10 MG tablet as needed      EPIDIOLEX 100 mg/mL GIVE 8.2 ML BY MOUTH 2 TIMES A DAY. 492 mL 4    fluticasone propionate (FLONASE) 50 mcg/actuation nasal spray 1 spray by Each Nostril route daily as needed.      gabapentin (NEURONTIN) 600 MG tablet Take 1 tablet (600 mg total) by mouth 3 (three) times daily. 270 tablet 1    HYDROcodone-acetaminophen (NORCO) 7.5-325 mg per tablet Take 1 tablet by mouth every 6 (six) hours as needed for Pain. 120 tablet 0    hydrOXYzine HCL (ATARAX) 25 MG tablet Take 1 tablet (25 mg total) by mouth daily 2 hours after breakfast. 90 tablet 1    lamoTRIgine (LAMICTAL) 150 MG Tab Take 2 tablets (300 mg total) by mouth 2 (two) times daily. Take with 25 mg tablets for dose of 325 mg  tablet 1    lamoTRIgine (LAMICTAL) 25 MG tablet Take 1 tablet (25 mg total) by mouth 2 (two) times daily. 180 tablet 1    levETIRAcetam (KEPPRA) 750 MG Tab Take 2 tablets (1,500 mg total) by mouth 2 (two) times  daily. 360 tablet 1    LIDOcaine (LIDODERM) 5 % Place 1 patch onto the skin every 24 hours. Remove & Discard patch within 12 hours or as directed by MD      LORazepam (ATIVAN) 0.5 MG tablet Take 0.5 mg by mouth every 6 (six) hours as needed for Anxiety.      losartan (COZAAR) 50 MG tablet Take 1 tablet (50 mg total) by mouth every evening. 90 tablet 1    magnesium oxide (MAG-OX) 400 mg (241.3 mg magnesium) tablet Take 1 tablet by mouth once daily.      metoprolol succinate (TOPROL-XL) 50 MG 24 hr tablet Take 1 tablet (50 mg total) by mouth once daily. 90 tablet 1    midazolam 5 mg/spray (0.1 mL) Spry 1 spray by Nasal route daily as needed.      montelukast (SINGULAIR) 10 mg tablet Take 1 tablet (10 mg total) by mouth every evening. 90 tablet 1    multivitamin with minerals tablet Take 1 tablet by mouth once daily.      nortriptyline (PAMELOR) 25 MG capsule Take 25 mg by mouth once daily.      omeprazole (PRILOSEC) 40 MG capsule Take 1 capsule (40 mg total) by mouth every morning. 90 capsule 2    ondansetron (ZOFRAN) 4 MG tablet Take 8 mg by mouth every 8 (eight) hours as needed.       potassium gluconate 595 mg (99 mg) Tab Take 1 tablet by mouth once daily.      TENS UNITS MISC by Misc.(Non-Drug; Combo Route) route.      tolterodine (DETROL LA) 2 MG Cp24 Take 2 capsules (4 mg total) by mouth once daily. 180 capsule 0    vitamin D (VITAMIN D3) 1000 units Tab Take 1,000 Units by mouth once daily.       No current facility-administered medications for this visit.       Past Medical History:   Diagnosis Date    Asthma     GERD (gastroesophageal reflux disease)     Hypertension     Seizures     Traumatic tear of left rotator cuff      Past Surgical History:   Procedure Laterality Date    APPENDECTOMY       SECTION      EPIDURAL STEROID INJECTION INTO LUMBAR SPINE N/A 2021    Procedure: Injection-steroid-epidural-lumbar;  Surgeon: Yolie Lebron MD;  Location: [facility] OR;  Service: Pain  Management;  Laterality: N/A;  L5-S1     INJECTION OF JOINT Left 5/27/2022    Procedure: Injection, Joint Sacroiliac Left and GTB;  Surgeon: Yolie Lebron MD;  Location: [facility] OR;  Service: Pain Management;  Laterality: Left;    JOINT REPLACEMENT      VAGAL NERVE STIMULATOR placement Left 06/2021     Family History     Problem Relation (Age of Onset)    COPD Mother, Father    Cancer Father    Heart disease Mother    Hyperlipidemia Mother    Hypertension Mother    Stroke Mother        Social History     Socioeconomic History    Marital status: Legally     Number of children: 2   Tobacco Use    Smoking status: Current Every Day Smoker     Packs/day: 1.50     Years: 20.00     Pack years: 30.00     Types: Vaping with nicotine    Smokeless tobacco: Never Used    Tobacco comment: started at age 22 daily use   Substance and Sexual Activity    Alcohol use: Not Currently    Drug use: Never    Sexual activity: Not Currently     Social Determinants of Health     Financial Resource Strain: Low Risk     Difficulty of Paying Living Expenses: Not very hard   Food Insecurity: No Food Insecurity    Worried About Running Out of Food in the Last Year: Never true    Ran Out of Food in the Last Year: Never true   Transportation Needs: Unmet Transportation Needs    Lack of Transportation (Medical): Yes    Lack of Transportation (Non-Medical): No   Physical Activity: Insufficiently Active    Days of Exercise per Week: 7 days    Minutes of Exercise per Session: 20 min   Stress: Stress Concern Present    Feeling of Stress : Very much   Social Connections: Socially Isolated    Frequency of Communication with Friends and Family: Twice a week    Frequency of Social Gatherings with Friends and Family: Twice a week    Attends Temple Services: Never    Active Member of Clubs or Organizations: No    Attends Club or Organization Meetings: Never    Marital Status:    Housing Stability: Low Risk      Unable to Pay for Housing in the Last Year: No    Number of Places Lived in the Last Year: 2    Unstable Housing in the Last Year: No       Review of Systems    OBJECTIVE:     Vital Signs     There is no height or weight on file to calculate BMI.    Neurosurgery Physical Exam      Diagnostic Results:  No recent imaging    ASSESSMENT/PLAN:     We will need to get updated x-rays of the cervical spine and neck area and have her come in for physical evaluation and interrogation of the vagal nerve stimulator.        Note dictated with voice recognition software, please excuse any grammatical errors.

## 2022-07-26 NOTE — TELEPHONE ENCOUNTER
Called x 2 around 8:15am - no answer. Left  for patient informing her that Dr. Corona had an urgent case put on this morning, and we have rescheduled her 8:30 to a 10am appointment. I have since called again - no answer.

## 2022-07-28 ENCOUNTER — PATIENT MESSAGE (OUTPATIENT)
Dept: OBSTETRICS AND GYNECOLOGY | Facility: CLINIC | Age: 42
End: 2022-07-28

## 2022-07-28 ENCOUNTER — LAB VISIT (OUTPATIENT)
Dept: LAB | Facility: HOSPITAL | Age: 42
End: 2022-07-28
Attending: NURSE PRACTITIONER
Payer: COMMERCIAL

## 2022-07-28 ENCOUNTER — OFFICE VISIT (OUTPATIENT)
Dept: OBSTETRICS AND GYNECOLOGY | Facility: CLINIC | Age: 42
End: 2022-07-28
Payer: COMMERCIAL

## 2022-07-28 VITALS
SYSTOLIC BLOOD PRESSURE: 134 MMHG | DIASTOLIC BLOOD PRESSURE: 86 MMHG | BODY MASS INDEX: 43.35 KG/M2 | WEIGHT: 244.69 LBS

## 2022-07-28 DIAGNOSIS — R10.30 LOWER ABDOMINAL PAIN: ICD-10-CM

## 2022-07-28 DIAGNOSIS — Z01.419 WELL WOMAN EXAM WITH ROUTINE GYNECOLOGICAL EXAM: Primary | ICD-10-CM

## 2022-07-28 DIAGNOSIS — Z12.4 ENCOUNTER FOR SCREENING FOR CERVICAL CANCER: ICD-10-CM

## 2022-07-28 LAB — HCG INTACT+B SERPL-ACNC: <1.2 MIU/ML

## 2022-07-28 PROCEDURE — 3079F DIAST BP 80-89 MM HG: CPT | Mod: CPTII,S$GLB,, | Performed by: NURSE PRACTITIONER

## 2022-07-28 PROCEDURE — 4010F ACE/ARB THERAPY RXD/TAKEN: CPT | Mod: CPTII,S$GLB,, | Performed by: NURSE PRACTITIONER

## 2022-07-28 PROCEDURE — 99386 PREV VISIT NEW AGE 40-64: CPT | Mod: 25,S$GLB,, | Performed by: NURSE PRACTITIONER

## 2022-07-28 PROCEDURE — 3075F PR MOST RECENT SYSTOLIC BLOOD PRESS GE 130-139MM HG: ICD-10-PCS | Mod: CPTII,S$GLB,, | Performed by: NURSE PRACTITIONER

## 2022-07-28 PROCEDURE — 81025 URINE PREGNANCY TEST: CPT | Mod: S$GLB,,, | Performed by: NURSE PRACTITIONER

## 2022-07-28 PROCEDURE — 3008F PR BODY MASS INDEX (BMI) DOCUMENTED: ICD-10-PCS | Mod: CPTII,S$GLB,, | Performed by: NURSE PRACTITIONER

## 2022-07-28 PROCEDURE — 1159F PR MEDICATION LIST DOCUMENTED IN MEDICAL RECORD: ICD-10-PCS | Mod: CPTII,S$GLB,, | Performed by: NURSE PRACTITIONER

## 2022-07-28 PROCEDURE — 99999 PR PBB SHADOW E&M-EST. PATIENT-LVL V: CPT | Mod: PBBFAC,,, | Performed by: NURSE PRACTITIONER

## 2022-07-28 PROCEDURE — 36415 COLL VENOUS BLD VENIPUNCTURE: CPT | Performed by: NURSE PRACTITIONER

## 2022-07-28 PROCEDURE — 88175 CYTOPATH C/V AUTO FLUID REDO: CPT | Performed by: NURSE PRACTITIONER

## 2022-07-28 PROCEDURE — 81025 PR  URINE PREGNANCY TEST: ICD-10-PCS | Mod: S$GLB,,, | Performed by: NURSE PRACTITIONER

## 2022-07-28 PROCEDURE — 81002 URINALYSIS NONAUTO W/O SCOPE: CPT | Mod: S$GLB,,, | Performed by: NURSE PRACTITIONER

## 2022-07-28 PROCEDURE — 99999 PR PBB SHADOW E&M-EST. PATIENT-LVL V: ICD-10-PCS | Mod: PBBFAC,,, | Performed by: NURSE PRACTITIONER

## 2022-07-28 PROCEDURE — 4010F PR ACE/ARB THEARPY RXD/TAKEN: ICD-10-PCS | Mod: CPTII,S$GLB,, | Performed by: NURSE PRACTITIONER

## 2022-07-28 PROCEDURE — 1159F MED LIST DOCD IN RCRD: CPT | Mod: CPTII,S$GLB,, | Performed by: NURSE PRACTITIONER

## 2022-07-28 PROCEDURE — 99386 PR PREVENTIVE VISIT,NEW,40-64: ICD-10-PCS | Mod: 25,S$GLB,, | Performed by: NURSE PRACTITIONER

## 2022-07-28 PROCEDURE — 87624 HPV HI-RISK TYP POOLED RSLT: CPT | Performed by: NURSE PRACTITIONER

## 2022-07-28 PROCEDURE — 3008F BODY MASS INDEX DOCD: CPT | Mod: CPTII,S$GLB,, | Performed by: NURSE PRACTITIONER

## 2022-07-28 PROCEDURE — 3079F PR MOST RECENT DIASTOLIC BLOOD PRESSURE 80-89 MM HG: ICD-10-PCS | Mod: CPTII,S$GLB,, | Performed by: NURSE PRACTITIONER

## 2022-07-28 PROCEDURE — 3075F SYST BP GE 130 - 139MM HG: CPT | Mod: CPTII,S$GLB,, | Performed by: NURSE PRACTITIONER

## 2022-07-28 PROCEDURE — 81002 PR URINALYSIS NONAUTO W/O SCOPE: ICD-10-PCS | Mod: S$GLB,,, | Performed by: NURSE PRACTITIONER

## 2022-07-28 PROCEDURE — 84702 CHORIONIC GONADOTROPIN TEST: CPT | Performed by: NURSE PRACTITIONER

## 2022-07-28 RX ORDER — TOLTERODINE TARTRATE 2 MG/1
TABLET, EXTENDED RELEASE ORAL
COMMUNITY
Start: 2022-03-19 | End: 2022-07-28

## 2022-07-28 RX ORDER — MELOXICAM 15 MG
15 TABLET ORAL DAILY
Status: ON HOLD | COMMUNITY
Start: 2022-06-23 | End: 2022-09-14

## 2022-07-28 NOTE — PROGRESS NOTES
Subjective:       Patient ID: Annie Baird is a 42 y.o. female.    Chief Complaint:  Annual Exam    Patient's last menstrual period was 2022.  History of Present Illness  Annual Exam-Premenopausal  Patient presents for annual exam. The patient is sexually active. GYN screening history: last pap: was normal and patient does not recall when last pap was. Last mmg 2021 normal. The patient wears seatbelts: yes. The patient participates in regular exercise: yes. Has the patient ever been transfused or tattooed?: yes. The patient reports that there is not domestic violence in her life.  Patient has complaints of lower abdominal pain for 1 week.  Denies urinary symptoms patient reports associated nausea, vomiting, and diarrhea.  Patient concerned for pregnancy although history of BTL.  Patient with history of recurrent dermoid cyst on ovaries requiring surgical removal x 6.     OB History    Para Term  AB Living   2 2 1 1   2   SAB IAB Ectopic Multiple Live Births           2      # Outcome Date GA Lbr Urbano/2nd Weight Sex Delivery Anes PTL Lv   2  2007        RAFAL   1 Term 2003        RAFAL       Review of Systems  Review of Systems   Constitutional: Negative for appetite change, fatigue, fever and unexpected weight change.   Eyes: Negative for visual disturbance.   Cardiovascular: Negative for chest pain.   Gastrointestinal: Positive for abdominal pain (lower), diarrhea, nausea and vomiting. Negative for bloating and constipation.   Genitourinary: Negative for bladder incontinence, dysmenorrhea, dyspareunia, dysuria, flank pain, frequency, genital sores, menorrhagia, menstrual problem, pelvic pain, urgency, vaginal bleeding, vaginal discharge, vaginal pain, postcoital bleeding, vaginal dryness and vaginal odor.   Integumentary:  Negative for rash, acne, mole/lesion, breast mass, nipple discharge, breast skin changes and breast tenderness.   Neurological: Negative for syncope and  headaches.   Hematological: Negative for adenopathy. Does not bruise/bleed easily.   All other systems reviewed and are negative.  Breast: Positive for breast self exam.Negative for asymmetry, lump, mass, nipple discharge, skin changes and tenderness           Objective:      Physical Exam:   Constitutional: She is oriented to person, place, and time. She appears well-developed and well-nourished.    HENT:   Head: Normocephalic and atraumatic.    Eyes: Pupils are equal, round, and reactive to light. Conjunctivae and EOM are normal.     Cardiovascular: Normal rate and regular rhythm.     Pulmonary/Chest: Effort normal. Right breast exhibits no inverted nipple, no mass, no nipple discharge, no skin change, no tenderness, no bleeding and no swelling. Left breast exhibits no inverted nipple, no mass, no nipple discharge, no skin change, no tenderness, no bleeding and no swelling. Breasts are symmetrical.        Abdominal: Soft. Hernia confirmed negative in the right inguinal area and confirmed negative in the left inguinal area.     Genitourinary:    Inguinal canal, vagina, uterus, right adnexa, left adnexa and rectum normal.      Pelvic exam was performed with patient supine.   The external female genitalia was normal.   Genitalia hair distrobution normal .   Labial bartholins normal.There is no rash, tenderness, lesion or injury on the right labia. There is no rash, tenderness, lesion or injury on the left labia. Cervix is normal. No erythema,  no vaginal discharge, bleeding, rectocele, cystocele or unspecified prolapse of vaginal walls in the vagina. Cervix exhibits no motion tenderness and no friability.    pap smear completedUterus is not tender.           Musculoskeletal: Normal range of motion and moves all extremeties.      Lymphadenopathy: No inguinal adenopathy noted on the right or left side.    Neurological: She is alert and oriented to person, place, and time.    Skin: Skin is warm and dry. No rash noted.  No erythema.    Psychiatric: She has a normal mood and affect. Her behavior is normal. Judgment and thought content normal.       UPT negative.  Urine dipstick shows negative for all components.       Assessment:     1. Well woman exam with routine gynecological exam    2. Encounter for screening for cervical cancer    3. Lower abdominal pain              Plan:   Summer was seen today for annual exam.    Diagnoses and all orders for this visit:    Well woman exam with routine gynecological exam  -     Ambulatory referral/consult to Obstetrics / Gynecology  -     Liquid-Based Pap Smear, Screening  -     HPV High Risk Genotypes, PCR    Encounter for screening for cervical cancer  -     Liquid-Based Pap Smear, Screening  -     HPV High Risk Genotypes, PCR    Lower abdominal pain  -     HCG, Quantitative; Future  -     POCT urine pregnancy  -     POCT urine dipstick without microscope  -     US Pelvis Complete Non OB; Future      Abdominal pain in combination with NVD suspicious for viral etiology. If persists, recommend follow up with PCP for further evaluation.     Follow up with me in 1 year for annual well woman exam.

## 2022-07-29 ENCOUNTER — TELEPHONE (OUTPATIENT)
Dept: NEUROLOGY | Facility: CLINIC | Age: 42
End: 2022-07-29
Payer: COMMERCIAL

## 2022-07-29 NOTE — TELEPHONE ENCOUNTER
----- Message from Jorge Bobo MA sent at 7/29/2022  4:28 PM CDT -----    ----- Message -----  From: Narcisa Mckeon  Sent: 7/29/2022   1:41 PM CDT  To: C[facility]ART[facility]NE[facility] Staff    Pt would like to be called back regarding  a referral to [facility]   [phone]    Pt can be reached at  [phone]

## 2022-08-02 ENCOUNTER — TELEPHONE (OUTPATIENT)
Dept: NEUROSURGERY | Facility: CLINIC | Age: 42
End: 2022-08-02
Payer: COMMERCIAL

## 2022-08-02 NOTE — TELEPHONE ENCOUNTER
----- Message from Marii Thompson sent at 8/2/2022  9:27 AM CDT -----  Contact: [phone]  Pt is calling regarding VMS . Please call back at [phone] . Thanks/justin

## 2022-08-02 NOTE — TELEPHONE ENCOUNTER
Spoke with pt, she stated that she needed to reschedule her Monday appt due to family member having extensive surgery this Friday. Patient was rescheduled for 8/11 @ 3pm with xray being scheduled for 2:15 with Og.    If unable to make appt patient will call back.

## 2022-08-04 LAB
FINAL PATHOLOGIC DIAGNOSIS: NORMAL
HPV HR 12 DNA SPEC QL NAA+PROBE: NEGATIVE
HPV16 AG SPEC QL: NEGATIVE
HPV18 DNA SPEC QL NAA+PROBE: NEGATIVE
Lab: NORMAL

## 2022-08-09 ENCOUNTER — TELEPHONE (OUTPATIENT)
Dept: NEUROSURGERY | Facility: CLINIC | Age: 42
End: 2022-08-09
Payer: COMMERCIAL

## 2022-08-09 NOTE — TELEPHONE ENCOUNTER
Spoke to pt and confirmed rescheduled date and times for imaging and f/u        ----- Message from Anil Salazar sent at 8/9/2022  2:37 PM CDT -----  Contact: Patient  The pt called and would like to have a nurse call her back    This is regarding her appt    She can be reached at [phone]

## 2022-08-17 ENCOUNTER — OFFICE VISIT (OUTPATIENT)
Dept: NEUROSURGERY | Facility: CLINIC | Age: 42
End: 2022-08-17
Payer: COMMERCIAL

## 2022-08-17 ENCOUNTER — HOSPITAL ENCOUNTER (OUTPATIENT)
Dept: RADIOLOGY | Facility: HOSPITAL | Age: 42
Discharge: HOME | End: 2022-08-17
Attending: NEUROLOGICAL SURGERY
Payer: COMMERCIAL

## 2022-08-17 VITALS
OXYGEN SATURATION: 98 % | HEIGHT: 63 IN | HEART RATE: 89 BPM | SYSTOLIC BLOOD PRESSURE: 140 MMHG | DIASTOLIC BLOOD PRESSURE: 81 MMHG | BODY MASS INDEX: 43.59 KG/M2 | WEIGHT: 246 LBS

## 2022-08-17 DIAGNOSIS — Z96.89 S/P PLACEMENT OF VNS (VAGUS NERVE STIMULATION) DEVICE: ICD-10-CM

## 2022-08-17 PROCEDURE — 4010F PR ACE/ARB THEARPY RXD/TAKEN: ICD-10-PCS | Mod: CPTII,S$GLB,, | Performed by: PHYSICIAN ASSISTANT

## 2022-08-17 PROCEDURE — 1159F PR MEDICATION LIST DOCUMENTED IN MEDICAL RECORD: ICD-10-PCS | Mod: CPTII,S$GLB,, | Performed by: PHYSICIAN ASSISTANT

## 2022-08-17 PROCEDURE — 4010F ACE/ARB THERAPY RXD/TAKEN: CPT | Mod: CPTII,S$GLB,, | Performed by: PHYSICIAN ASSISTANT

## 2022-08-17 PROCEDURE — 3079F DIAST BP 80-89 MM HG: CPT | Mod: CPTII,S$GLB,, | Performed by: PHYSICIAN ASSISTANT

## 2022-08-17 PROCEDURE — 72040 XR CERVICAL SPINE AP LATERAL: ICD-10-PCS | Mod: 26,,, | Performed by: RADIOLOGY

## 2022-08-17 PROCEDURE — 3077F SYST BP >= 140 MM HG: CPT | Mod: CPTII,S$GLB,, | Performed by: PHYSICIAN ASSISTANT

## 2022-08-17 PROCEDURE — 3008F PR BODY MASS INDEX (BMI) DOCUMENTED: ICD-10-PCS | Mod: CPTII,S$GLB,, | Performed by: PHYSICIAN ASSISTANT

## 2022-08-17 PROCEDURE — 3079F PR MOST RECENT DIASTOLIC BLOOD PRESSURE 80-89 MM HG: ICD-10-PCS | Mod: CPTII,S$GLB,, | Performed by: PHYSICIAN ASSISTANT

## 2022-08-17 PROCEDURE — 1159F MED LIST DOCD IN RCRD: CPT | Mod: CPTII,S$GLB,, | Performed by: PHYSICIAN ASSISTANT

## 2022-08-17 PROCEDURE — 3008F BODY MASS INDEX DOCD: CPT | Mod: CPTII,S$GLB,, | Performed by: PHYSICIAN ASSISTANT

## 2022-08-17 PROCEDURE — 99999 PR PBB SHADOW E&M-EST. PATIENT-LVL IV: CPT | Mod: PBBFAC,,, | Performed by: PHYSICIAN ASSISTANT

## 2022-08-17 PROCEDURE — 3077F PR MOST RECENT SYSTOLIC BLOOD PRESSURE >= 140 MM HG: ICD-10-PCS | Mod: CPTII,S$GLB,, | Performed by: PHYSICIAN ASSISTANT

## 2022-08-17 PROCEDURE — 99215 PR OFFICE/OUTPT VISIT, EST, LEVL V, 40-54 MIN: ICD-10-PCS | Mod: S$GLB,,, | Performed by: PHYSICIAN ASSISTANT

## 2022-08-17 PROCEDURE — 72040 X-RAY EXAM NECK SPINE 2-3 VW: CPT | Mod: TC

## 2022-08-17 PROCEDURE — 72040 X-RAY EXAM NECK SPINE 2-3 VW: CPT | Mod: 26,,, | Performed by: RADIOLOGY

## 2022-08-17 PROCEDURE — 99999 PR PBB SHADOW E&M-EST. PATIENT-LVL IV: ICD-10-PCS | Mod: PBBFAC,,, | Performed by: PHYSICIAN ASSISTANT

## 2022-08-17 PROCEDURE — 99215 OFFICE O/P EST HI 40 MIN: CPT | Mod: S$GLB,,, | Performed by: PHYSICIAN ASSISTANT

## 2022-08-17 RX ORDER — TIZANIDINE 4 MG/1
4 TABLET ORAL 2 TIMES DAILY
COMMUNITY
Start: 2022-08-04 | End: 2023-02-09

## 2022-08-17 RX ORDER — HYDROCODONE BITARTRATE AND ACETAMINOPHEN 7.5; 325 MG/1; MG/1
1 TABLET ORAL EVERY 8 HOURS PRN
COMMUNITY
Start: 2022-08-04 | End: 2022-09-15 | Stop reason: ALTCHOICE

## 2022-08-17 NOTE — PROGRESS NOTES
Neurosurgery  Established Patient    SUBJECTIVE:     History of Present Illness:  Annie Baird is a 42 y.o. female with a past medical history significant for complex epilepsy including Lennox-Gastaut syndrome VNS placement at outside facility about 6 years ago with lead and generator replacement on 6/14/21 by Dr. Corona due to neck pain symptoms.     She was last seen by Dr. Corona as a virtual visit on 7/26/22 for concern for the wire disconnection. Dr. Corona recommended cervical xrays to review VNS placement and in person follow up for VNS interrogation.     Today, she feels as thought the lead is pulling at her neck and notes great pain and discomfort when turning her head to the right. Pain has been present for about 3-4 months. States she feels the VNS electrical impulses more often and generator feels mobile under her skin. Denies fevers, chills, or abdominal pain.     Review of patient's allergies indicates:   Allergen Reactions    Depakote [divalproex] Hives    Dilantin [phenytoin sodium extended] Hives       Current Outpatient Medications   Medication Sig Dispense Refill    acetaminophen (TYLENOL) 325 MG tablet Take 325 mg by mouth every 6 (six) hours as needed for Pain.      benztropine (COGENTIN) 1 MG tablet Take 1 mg by mouth once daily.       clotrimazole (LOTRIMIN) 1 % cream Apply topically 2 (two) times daily as needed.      cyclobenzaprine (FLEXERIL) 10 MG tablet as needed      EPIDIOLEX 100 mg/mL GIVE 8.2 ML BY MOUTH 2 TIMES A DAY. 492 mL 4    gabapentin (NEURONTIN) 600 MG tablet Take 1 tablet (600 mg total) by mouth 3 (three) times daily. 270 tablet 1    HYDROcodone-acetaminophen (NORCO) 7.5-325 mg per tablet Take 1 tablet by mouth every 8 (eight) hours as needed.      hydrOXYzine HCL (ATARAX) 25 MG tablet Take 1 tablet (25 mg total) by mouth daily 2 hours after breakfast. 90 tablet 1    lamoTRIgine (LAMICTAL) 150 MG Tab Take 2 tablets (300 mg total) by mouth 2 (two) times daily. Take  with 25 mg tablets for dose of 325 mg  tablet 1    lamoTRIgine (LAMICTAL) 25 MG tablet Take 1 tablet (25 mg total) by mouth 2 (two) times daily. 180 tablet 1    levETIRAcetam (KEPPRA) 750 MG Tab Take 2 tablets (1,500 mg total) by mouth 2 (two) times daily. 360 tablet 1    LIDOcaine (LIDODERM) 5 % Place 1 patch onto the skin every 24 hours. Remove & Discard patch within 12 hours or as directed by MD      LORazepam (ATIVAN) 0.5 MG tablet Take 0.5 mg by mouth every 6 (six) hours as needed for Anxiety.      losartan (COZAAR) 50 MG tablet Take 1 tablet (50 mg total) by mouth every evening. 90 tablet 1    magnesium oxide (MAG-OX) 400 mg (241.3 mg magnesium) tablet Take 1 tablet by mouth once daily.      metoprolol succinate (TOPROL-XL) 50 MG 24 hr tablet Take 1 tablet (50 mg total) by mouth once daily. 90 tablet 1    MOBIC 15 mg tablet Take 15 mg by mouth once daily.      montelukast (SINGULAIR) 10 mg tablet Take 1 tablet (10 mg total) by mouth every evening. 90 tablet 1    multivitamin with minerals tablet Take 1 tablet by mouth once daily.      nortriptyline (PAMELOR) 25 MG capsule Take 25 mg by mouth once daily.      omeprazole (PRILOSEC) 40 MG capsule Take 1 capsule (40 mg total) by mouth every morning. 90 capsule 2    ondansetron (ZOFRAN) 4 MG tablet Take 8 mg by mouth every 8 (eight) hours as needed.       potassium gluconate 595 mg (99 mg) Tab Take 1 tablet by mouth once daily.      TENS UNITS MISC by Misc.(Non-Drug; Combo Route) route.      tiZANidine (ZANAFLEX) 4 MG tablet Take 4 mg by mouth 2 (two) times daily.      tolterodine (DETROL LA) 2 MG Cp24 Take 2 capsules (4 mg total) by mouth once daily. 180 capsule 0    vitamin D (VITAMIN D3) 1000 units Tab Take 1,000 Units by mouth once daily.       No current facility-administered medications for this visit.       Past Medical History:   Diagnosis Date    Asthma     GERD (gastroesophageal reflux disease)     Hypertension     Seizures      Traumatic tear of left rotator cuff      Past Surgical History:   Procedure Laterality Date    APPENDECTOMY       SECTION      EPIDURAL STEROID INJECTION INTO LUMBAR SPINE N/A 2021    Procedure: Injection-steroid-epidural-lumbar;  Surgeon: Yolie Lebron MD;  Location: [facility] OR;  Service: Pain Management;  Laterality: N/A;  L5-S1     INJECTION OF JOINT Left 2022    Procedure: Injection, Joint Sacroiliac Left and GTB;  Surgeon: Yolie Lebron MD;  Location: [facility] OR;  Service: Pain Management;  Laterality: Left;    JOINT REPLACEMENT      VAGAL NERVE STIMULATOR placement Left 2021     Family History     Problem Relation (Age of Onset)    COPD Mother, Father    Cancer Father    Heart disease Mother    Hyperlipidemia Mother    Hypertension Mother    Stroke Mother        Social History     Socioeconomic History    Marital status: Legally     Number of children: 2   Tobacco Use    Smoking status: Current Every Day Smoker     Packs/day: 1.50     Years: 20.00     Pack years: 30.00     Types: Vaping with nicotine    Smokeless tobacco: Never Used    Tobacco comment: started at age 22 daily use   Substance and Sexual Activity    Alcohol use: Not Currently    Drug use: Never    Sexual activity: Yes     Partners: Male     Birth control/protection: None     Social Determinants of Health     Financial Resource Strain: Low Risk     Difficulty of Paying Living Expenses: Not very hard   Food Insecurity: No Food Insecurity    Worried About Running Out of Food in the Last Year: Never true    Ran Out of Food in the Last Year: Never true   Transportation Needs: Unmet Transportation Needs    Lack of Transportation (Medical): Yes    Lack of Transportation (Non-Medical): No   Physical Activity: Insufficiently Active    Days of Exercise per Week: 7 days    Minutes of Exercise per Session: 20 min   Stress: Stress Concern Present    Feeling of Stress : Very much   Social Connections:  "Socially Isolated    Frequency of Communication with Friends and Family: Twice a week    Frequency of Social Gatherings with Friends and Family: Twice a week    Attends Scientologist Services: Never    Active Member of Clubs or Organizations: No    Attends Club or Organization Meetings: Never    Marital Status:    Housing Stability: Low Risk     Unable to Pay for Housing in the Last Year: No    Number of Places Lived in the Last Year: 2    Unstable Housing in the Last Year: No       Review of Systems  Positive per HPI, otherwise a pertinent ROS was performed and was negative.        OBJECTIVE:     Vital Signs  Pulse: 89  BP: (!) 140/81  SpO2: 98 %  Height: 5' 3" (160 cm)  Weight: 111.6 kg (246 lb)  Body mass index is 43.58 kg/m².      Neurosurgery Physical Exam  General: well developed, well nourished, no distress.   Head: normocephalic, atraumatic  Neck: No tracheal deviation. No palpable masses. Full ROM.   Neurologic: Alert and oriented. Thought content appropriate.  Mental Status: Awake, Alert, Oriented x 4  Language: No aphasia  Speech: No dysarthria  Cranial nerves: face symmetric, tongue midline, CN II-XII grossly intact.   Eyes: pupils equal, round, EOMI.   Pulmonary: normal respirations, no signs of respiratory distress  Abdomen: soft, non-distended, not tender to palpation    Sensory: intact to light touch throughout  Motor Strength: Moves all extremities spontaneously with good tone.  Grossly full strength upper and lower extremities. No abnormal movements seen.   Vascular: No LE edema.   Skin: Skin is warm, dry and intact.    Left chest incision well healed. Skin tugging noted at neck. Generator mobile.         Diagnostic Results:  The following diagnostics personally reviewed along with associated radiology report:   Cervical xray 8/17: VNS in place, lead with slight tightening of loop in comparison to chest xray on 7/22.        ASSESSMENT/PLAN:     Summer Izabella Baird is a 42 y.o. " female s/p VNS lead and battery replacementon 6/14/22 due to neck pain/discomfort who presents to clinic with complaint of recurrent left sided neck pain and generator feeling mobile under her skin.    Her VNS was interrogated and was found to be working with parameters confirmed. Upon in-person visualization it appears the lead is pulling/taut at her neck. Xray results stable with slight decrease in loop size making me suspicions it is pulling more today than in July when previous xray was taken. Her generator feels very mobile under her skin and is causing discomfort.     I would like her to follow up with Dr. Corona for discussion of VNS lead and generator revision vs exploration.     She is in agreement with the plan and voiced understanding. She is encouraged to call the clinic with any questions, concerns, or adverse clinical change.       Lakisha Darling PA-C  Neurosurgery  [facility]    Time spent on this encounter: 45 minutes. This includes face to face time and non-face to face time preparing to see the patient (eg, review of tests), obtaining and/or reviewing separately obtained history, documenting clinical information in the electronic or other health record, independently interpreting results and communicating results to the patient/family/caregiver, or care coordinator.

## 2022-08-17 NOTE — H&P (VIEW-ONLY)
Neurosurgery  Established Patient    SUBJECTIVE:     History of Present Illness:  Annie Baird is a 42 y.o. female with a past medical history significant for complex epilepsy including Lennox-Gastaut syndrome VNS placement at outside facility about 6 years ago with lead and generator replacement on 6/14/21 by Dr. Corona due to neck pain symptoms.     She was last seen by Dr. Corona as a virtual visit on 7/26/22 for concern for the wire disconnection. Dr. Corona recommended cervical xrays to review VNS placement and in person follow up for VNS interrogation.     Today, she feels as thought the lead is pulling at her neck and notes great pain and discomfort when turning her head to the right. Pain has been present for about 3-4 months. States she feels the VNS electrical impulses more often and generator feels mobile under her skin. Denies fevers, chills, or abdominal pain.     Review of patient's allergies indicates:   Allergen Reactions    Depakote [divalproex] Hives    Dilantin [phenytoin sodium extended] Hives       Current Outpatient Medications   Medication Sig Dispense Refill    acetaminophen (TYLENOL) 325 MG tablet Take 325 mg by mouth every 6 (six) hours as needed for Pain.      benztropine (COGENTIN) 1 MG tablet Take 1 mg by mouth once daily.       clotrimazole (LOTRIMIN) 1 % cream Apply topically 2 (two) times daily as needed.      cyclobenzaprine (FLEXERIL) 10 MG tablet as needed      EPIDIOLEX 100 mg/mL GIVE 8.2 ML BY MOUTH 2 TIMES A DAY. 492 mL 4    gabapentin (NEURONTIN) 600 MG tablet Take 1 tablet (600 mg total) by mouth 3 (three) times daily. 270 tablet 1    HYDROcodone-acetaminophen (NORCO) 7.5-325 mg per tablet Take 1 tablet by mouth every 8 (eight) hours as needed.      hydrOXYzine HCL (ATARAX) 25 MG tablet Take 1 tablet (25 mg total) by mouth daily 2 hours after breakfast. 90 tablet 1    lamoTRIgine (LAMICTAL) 150 MG Tab Take 2 tablets (300 mg total) by mouth 2 (two) times daily. Take  with 25 mg tablets for dose of 325 mg  tablet 1    lamoTRIgine (LAMICTAL) 25 MG tablet Take 1 tablet (25 mg total) by mouth 2 (two) times daily. 180 tablet 1    levETIRAcetam (KEPPRA) 750 MG Tab Take 2 tablets (1,500 mg total) by mouth 2 (two) times daily. 360 tablet 1    LIDOcaine (LIDODERM) 5 % Place 1 patch onto the skin every 24 hours. Remove & Discard patch within 12 hours or as directed by MD      LORazepam (ATIVAN) 0.5 MG tablet Take 0.5 mg by mouth every 6 (six) hours as needed for Anxiety.      losartan (COZAAR) 50 MG tablet Take 1 tablet (50 mg total) by mouth every evening. 90 tablet 1    magnesium oxide (MAG-OX) 400 mg (241.3 mg magnesium) tablet Take 1 tablet by mouth once daily.      metoprolol succinate (TOPROL-XL) 50 MG 24 hr tablet Take 1 tablet (50 mg total) by mouth once daily. 90 tablet 1    MOBIC 15 mg tablet Take 15 mg by mouth once daily.      montelukast (SINGULAIR) 10 mg tablet Take 1 tablet (10 mg total) by mouth every evening. 90 tablet 1    multivitamin with minerals tablet Take 1 tablet by mouth once daily.      nortriptyline (PAMELOR) 25 MG capsule Take 25 mg by mouth once daily.      omeprazole (PRILOSEC) 40 MG capsule Take 1 capsule (40 mg total) by mouth every morning. 90 capsule 2    ondansetron (ZOFRAN) 4 MG tablet Take 8 mg by mouth every 8 (eight) hours as needed.       potassium gluconate 595 mg (99 mg) Tab Take 1 tablet by mouth once daily.      TENS UNITS MISC by Misc.(Non-Drug; Combo Route) route.      tiZANidine (ZANAFLEX) 4 MG tablet Take 4 mg by mouth 2 (two) times daily.      tolterodine (DETROL LA) 2 MG Cp24 Take 2 capsules (4 mg total) by mouth once daily. 180 capsule 0    vitamin D (VITAMIN D3) 1000 units Tab Take 1,000 Units by mouth once daily.       No current facility-administered medications for this visit.       Past Medical History:   Diagnosis Date    Asthma     GERD (gastroesophageal reflux disease)     Hypertension     Seizures      Traumatic tear of left rotator cuff      Past Surgical History:   Procedure Laterality Date    APPENDECTOMY       SECTION      EPIDURAL STEROID INJECTION INTO LUMBAR SPINE N/A 2021    Procedure: Injection-steroid-epidural-lumbar;  Surgeon: Yolie Lebron MD;  Location: [facility] OR;  Service: Pain Management;  Laterality: N/A;  L5-S1     INJECTION OF JOINT Left 2022    Procedure: Injection, Joint Sacroiliac Left and GTB;  Surgeon: Yolie Lebron MD;  Location: [facility] OR;  Service: Pain Management;  Laterality: Left;    JOINT REPLACEMENT      VAGAL NERVE STIMULATOR placement Left 2021     Family History     Problem Relation (Age of Onset)    COPD Mother, Father    Cancer Father    Heart disease Mother    Hyperlipidemia Mother    Hypertension Mother    Stroke Mother        Social History     Socioeconomic History    Marital status: Legally     Number of children: 2   Tobacco Use    Smoking status: Current Every Day Smoker     Packs/day: 1.50     Years: 20.00     Pack years: 30.00     Types: Vaping with nicotine    Smokeless tobacco: Never Used    Tobacco comment: started at age 22 daily use   Substance and Sexual Activity    Alcohol use: Not Currently    Drug use: Never    Sexual activity: Yes     Partners: Male     Birth control/protection: None     Social Determinants of Health     Financial Resource Strain: Low Risk     Difficulty of Paying Living Expenses: Not very hard   Food Insecurity: No Food Insecurity    Worried About Running Out of Food in the Last Year: Never true    Ran Out of Food in the Last Year: Never true   Transportation Needs: Unmet Transportation Needs    Lack of Transportation (Medical): Yes    Lack of Transportation (Non-Medical): No   Physical Activity: Insufficiently Active    Days of Exercise per Week: 7 days    Minutes of Exercise per Session: 20 min   Stress: Stress Concern Present    Feeling of Stress : Very much   Social Connections:  "Socially Isolated    Frequency of Communication with Friends and Family: Twice a week    Frequency of Social Gatherings with Friends and Family: Twice a week    Attends [employer] Services: Never    Active Member of Clubs or Organizations: No    Attends Club or Organization Meetings: Never    Marital Status:    Housing Stability: Low Risk     Unable to Pay for Housing in the Last Year: No    Number of Places Lived in the Last Year: 2    Unstable Housing in the Last Year: No       Review of Systems  Positive per HPI, otherwise a pertinent ROS was performed and was negative.        OBJECTIVE:     Vital Signs  Pulse: 89  BP: (!) 140/81  SpO2: 98 %  Height: 5' 3" (160 cm)  Weight: 111.6 kg (246 lb)  Body mass index is 43.58 kg/m².      Neurosurgery Physical Exam  General: well developed, well nourished, no distress.   Head: normocephalic, atraumatic  Neck: No tracheal deviation. No palpable masses. Full ROM.   Neurologic: Alert and oriented. Thought content appropriate.  Mental Status: Awake, Alert, Oriented x 4  Language: No aphasia  Speech: No dysarthria  Cranial nerves: face symmetric, tongue midline, CN II-XII grossly intact.   Eyes: pupils equal, round, EOMI.   Pulmonary: normal respirations, no signs of respiratory distress  Abdomen: soft, non-distended, not tender to palpation    Sensory: intact to light touch throughout  Motor Strength: Moves all extremities spontaneously with good tone.  Grossly full strength upper and lower extremities. No abnormal movements seen.   Vascular: No LE edema.   Skin: Skin is warm, dry and intact.    Left chest incision well healed. Skin tugging noted at neck. Generator mobile.         Diagnostic Results:  The following diagnostics personally reviewed along with associated radiology report:   Cervical xray 8/17: VNS in place, lead with slight tightening of loop in comparison to chest xray on 7/22.        ASSESSMENT/PLAN:     Summer Izabella Baird is a 42 y.o. " female s/p VNS lead and battery replacementon 6/14/22 due to neck pain/discomfort who presents to clinic with complaint of recurrent left sided neck pain and generator feeling mobile under her skin.    Her VNS was interrogated and was found to be working with parameters confirmed. Upon in-person visualization it appears the lead is pulling/taut at her neck. Xray results stable with slight decrease in loop size making me suspicions it is pulling more today than in July when previous xray was taken. Her generator feels very mobile under her skin and is causing discomfort.     I would like her to follow up with Dr. Corona for discussion of VNS lead and generator revision vs exploration.     She is in agreement with the plan and voiced understanding. She is encouraged to call the clinic with any questions, concerns, or adverse clinical change.       Lakisha Darling PA-C  Neurosurgery  [facility]    Time spent on this encounter: 45 minutes. This includes face to face time and non-face to face time preparing to see the patient (eg, review of tests), obtaining and/or reviewing separately obtained history, documenting clinical information in the electronic or other health record, independently interpreting results and communicating results to the patient/family/caregiver, or care coordinator.

## 2022-08-22 ENCOUNTER — TELEPHONE (OUTPATIENT)
Dept: NEUROSURGERY | Facility: CLINIC | Age: 42
End: 2022-08-22
Payer: COMMERCIAL

## 2022-08-22 NOTE — TELEPHONE ENCOUNTER
Spoke to pt and gave her an update. I told her that we are still waiting for Lakisha to speak with Dr. Corona about getting surgery scheduled. I told her I will call to update once I speak with lakisha tomorrow. Pt understands.      ----- Message from Navid White sent at 8/22/2022 12:26 PM CDT -----  Regarding: call  Contact: @ [phone]  Patient requesting a return call about the VNS wire is sticking out more and to get surgery update, pls call

## 2022-08-24 DIAGNOSIS — G40.211 LOCALIZATION-RELATED (FOCAL) (PARTIAL) SYMPTOMATIC EPILEPSY AND EPILEPTIC SYNDROMES WITH COMPLEX PARTIAL SEIZURES, INTRACTABLE, WITH STATUS EPILEPTICUS: Primary | ICD-10-CM

## 2022-08-31 ENCOUNTER — TELEPHONE (OUTPATIENT)
Dept: INTERNAL MEDICINE | Facility: CLINIC | Age: 42
End: 2022-08-31
Payer: COMMERCIAL

## 2022-08-31 NOTE — TELEPHONE ENCOUNTER
Spoke with patient; MA explained to patient Dr Méndez wasn't going to be able to care for her daughter due to she's underage and he's not a Pediatrician doctor; Pt stated daughter didn't want to see a pediatrician provider but she understand Dr Méndez decision; pt decided to cancel both appts until she could set up an appt for her daughter /LD

## 2022-09-02 ENCOUNTER — PATIENT MESSAGE (OUTPATIENT)
Dept: NEUROLOGY | Facility: CLINIC | Age: 42
End: 2022-09-02
Payer: COMMERCIAL

## 2022-09-06 ENCOUNTER — TELEPHONE (OUTPATIENT)
Dept: NEUROSURGERY | Facility: CLINIC | Age: 42
End: 2022-09-06
Payer: COMMERCIAL

## 2022-09-06 NOTE — TELEPHONE ENCOUNTER
----- Message from Deana Farfan sent at 9/6/2022 12:32 PM CDT -----  Contact: pt  Requesting call back       Confirmed patient's contact info below:  Contact Name: Annie Baird  Phone Number: [phone]

## 2022-09-12 ENCOUNTER — PATIENT MESSAGE (OUTPATIENT)
Dept: NEUROLOGY | Facility: CLINIC | Age: 42
End: 2022-09-12
Payer: COMMERCIAL

## 2022-09-12 ENCOUNTER — TELEPHONE (OUTPATIENT)
Dept: NEUROSURGERY | Facility: CLINIC | Age: 42
End: 2022-09-12
Payer: COMMERCIAL

## 2022-09-12 ENCOUNTER — TELEPHONE (OUTPATIENT)
Dept: NEUROLOGY | Facility: CLINIC | Age: 42
End: 2022-09-12
Payer: COMMERCIAL

## 2022-09-12 NOTE — TELEPHONE ENCOUNTER
----- Message from Kassandra Dominique sent at 9/12/2022  4:46 PM CDT -----  Regarding: pt advice  Contact: pt @ [phone]  Pt calling to speak with someone in Dr. Corona's office regarding her surgery, and her nasal spray, please call.

## 2022-09-12 NOTE — TELEPHONE ENCOUNTER
----- Message from Leopoldo Conway sent at 2022  9:44 AM CDT -----  Regarding: question  Type:  Pharmacy Calling to Clarify an RX    Name of Caller:  Summer    Pharmacy Name:    [id number] - [place] - [address] [place] [address]  Phone: [phone] Fax: [phone]    Prescription Name:  NAYZILAM® (midazolam) nasal spray 5mg     What do they need to clarify?:  pt used last dose last night b/c having sz    Best Call Back Number:  [phone]    Additional Information:  med was prescribed by previous neuro Dr Luciana Rossi for emergency use for sz. Pt is now out of emergency med. Box has refills noted but rx is  so cannot get refill.

## 2022-09-12 NOTE — TELEPHONE ENCOUNTER
Patient called about getting her seizure rescue medication filled. We are not familiar with the rescue meds. Her neurologist would need to fill that for her. Gave her arrival time of 6 am Wednesday for surgery to 2nd floor day of surgery center. And follow the fasting and bathing instructions previously given

## 2022-09-13 ENCOUNTER — PATIENT MESSAGE (OUTPATIENT)
Dept: NEUROSURGERY | Facility: CLINIC | Age: 42
End: 2022-09-13
Payer: COMMERCIAL

## 2022-09-13 ENCOUNTER — TELEPHONE (OUTPATIENT)
Dept: NEUROSURGERY | Facility: CLINIC | Age: 42
End: 2022-09-13
Payer: COMMERCIAL

## 2022-09-13 ENCOUNTER — ANESTHESIA EVENT (OUTPATIENT)
Dept: SURGERY | Facility: HOSPITAL | Age: 42
End: 2022-09-13
Payer: COMMERCIAL

## 2022-09-13 NOTE — PRE-PROCEDURE INSTRUCTIONS
PreOp Instructions given:   - Verbal medication information (what to hold and what to take)   - NPO guidelines 2400  - Arrival place directions given; time to be given the day before procedure by the   Surgeon's Office 0600 DOSC  - Bathing with antibacterial soap   - Don't wear any jewelry or bring any valuables AM of surgery   - No makeup or moisturizer to face   - No perfume/cologne, powder, lotions or aftershave   Pt. verbalized understanding.   Pt denies any h/o Anesthesia/Sedation complications or side effects.

## 2022-09-13 NOTE — ANESTHESIA PREPROCEDURE EVALUATION
[facility]  Anesthesia Pre-Operative Evaluation         Patient Name/: Annie Baird, 1980  MRN: [id number]    SUBJECTIVE:     Pre-operative evaluation for Procedure(s) (LRB):  REPLACEMENT, BATTERY, NEUROSTIMULATOR, VAGAL- revision (Left)     2022    Annie Baird is a 42 y.o. female w/ a significant PMHx of morbid obesity, GERD, mild intermittent asthma, HTN, GERD, anxiety, chronic pain, and Lennox-Gastaut syndrome s/p VNS lead and battery replacement 22 due to neck pain/discomfort     Patient now presents for the above procedure(s).      Prev airway:     Induction:  Intravenous    Intubated:  Postinduction    Mask Ventilation:  Easy with oral airway    Attempts:  1    Attempted By:  CRNA    Method of Intubation:  Direct    Blade:  Montero 2    Laryngeal View Grade: Grade I - full view of chords      Difficult Airway Encountered?: No      Complications:  None    Airway Device:  Oral endotracheal tube    Airway Device Size:  7.0    Style/Cuff Inflation:  Cuffed    Inflation Amount (mL):  7    Tube secured:  21    Secured at:  The lips    Placement Verified By:  Capnometry    Complicating Factors:  None      Patient Active Problem List   Diagnosis    Gastroesophageal reflux disease    Mild intermittent asthma without complication    Seizure disorder    Chronic midline low back pain    Primary hypertension    Hemiparesis affecting left side as late effect of stroke    Polysubstance dependence    Debility    ALESSIA (generalized anxiety disorder)    Severe obesity (BMI >= 40)    Overactive bladder    Lennox-Gastaut syndrome    B12 deficiency    Chronic pain syndrome    Raynaud's phenomenon    Spinal stenosis of lumbar region    Lumbar spondylosis    Degenerative disc disease, lumbar    Non-seasonal allergic rhinitis    S/P placement of VNS (vagus nerve stimulation) device    Mixed hyperlipidemia    Sacroiliitis    Depression, major, recurrent,  moderate       Review of patient's allergies indicates:   Allergen Reactions    Depakote [divalproex] Hives    Dilantin [phenytoin sodium extended] Hives       Current Inpatient Medications:       No current facility-administered medications on file prior to encounter.     Current Outpatient Medications on File Prior to Encounter   Medication Sig Dispense Refill    acetaminophen (TYLENOL) 325 MG tablet Take 325 mg by mouth every 6 (six) hours as needed for Pain.      benztropine (COGENTIN) 1 MG tablet Take 1 mg by mouth once daily.       clotrimazole (LOTRIMIN) 1 % cream Apply topically 2 (two) times daily as needed.      cyclobenzaprine (FLEXERIL) 10 MG tablet as needed      EPIDIOLEX 100 mg/mL GIVE 8.2 ML BY MOUTH 2 TIMES A DAY. 492 mL 4    gabapentin (NEURONTIN) 600 MG tablet Take 1 tablet (600 mg total) by mouth 3 (three) times daily. 270 tablet 1    HYDROcodone-acetaminophen (NORCO) 7.5-325 mg per tablet Take 1 tablet by mouth every 8 (eight) hours as needed.      hydrOXYzine HCL (ATARAX) 25 MG tablet Take 1 tablet (25 mg total) by mouth daily 2 hours after breakfast. 90 tablet 1    lamoTRIgine (LAMICTAL) 150 MG Tab Take 2 tablets (300 mg total) by mouth 2 (two) times daily. Take with 25 mg tablets for dose of 325 mg  tablet 1    lamoTRIgine (LAMICTAL) 25 MG tablet Take 1 tablet (25 mg total) by mouth 2 (two) times daily. 180 tablet 1    levETIRAcetam (KEPPRA) 750 MG Tab Take 2 tablets (1,500 mg total) by mouth 2 (two) times daily. 360 tablet 1    LIDOcaine (LIDODERM) 5 % Place 1 patch onto the skin every 24 hours. Remove & Discard patch within 12 hours or as directed by MD      LORazepam (ATIVAN) 0.5 MG tablet Take 0.5 mg by mouth every 6 (six) hours as needed for Anxiety.      losartan (COZAAR) 50 MG tablet Take 1 tablet (50 mg total) by mouth every evening. 90 tablet 1    magnesium oxide (MAG-OX) 400 mg (241.3 mg magnesium) tablet Take 1 tablet by mouth once daily.      metoprolol  succinate (TOPROL-XL) 50 MG 24 hr tablet Take 1 tablet (50 mg total) by mouth once daily. 90 tablet 1    MOBIC 15 mg tablet Take 15 mg by mouth once daily.      montelukast (SINGULAIR) 10 mg tablet Take 1 tablet (10 mg total) by mouth every evening. 90 tablet 1    multivitamin with minerals tablet Take 1 tablet by mouth once daily.      nortriptyline (PAMELOR) 25 MG capsule Take 25 mg by mouth once daily.      omeprazole (PRILOSEC) 40 MG capsule Take 1 capsule (40 mg total) by mouth every morning. 90 capsule 2    ondansetron (ZOFRAN) 4 MG tablet Take 8 mg by mouth every 8 (eight) hours as needed.       potassium gluconate 595 mg (99 mg) Tab Take 1 tablet by mouth once daily.      TENS UNITS MISC by Misc.(Non-Drug; Combo Route) route.      tiZANidine (ZANAFLEX) 4 MG tablet Take 4 mg by mouth 2 (two) times daily.      tolterodine (DETROL LA) 2 MG Cp24 Take 2 capsules (4 mg total) by mouth once daily. 180 capsule 0    vitamin D (VITAMIN D3) 1000 units Tab Take 1,000 Units by mouth once daily.         Past Surgical History:   Procedure Laterality Date    APPENDECTOMY       SECTION      EPIDURAL STEROID INJECTION INTO LUMBAR SPINE N/A 2021    Procedure: Injection-steroid-epidural-lumbar;  Surgeon: Yolie Lebron MD;  Location: [facility] OR;  Service: Pain Management;  Laterality: N/A;  L5-S1     INJECTION OF JOINT Left 2022    Procedure: Injection, Joint Sacroiliac Left and GTB;  Surgeon: Yolie Lebron MD;  Location: [facility] OR;  Service: Pain Management;  Laterality: Left;    JOINT REPLACEMENT      VAGAL NERVE STIMULATOR placement Left 2021       Social History:  Tobacco Use: High Risk    Smoking Tobacco Use: Every Day    Smokeless Tobacco Use: Never       Alcohol Use: Not At Risk    Frequency of Alcohol Consumption: Never    Average Number of Drinks: Patient does not drink    Frequency of Binge Drinking: Never       OBJECTIVE:     Vital Signs Range:  BMI Readings from Last 1  Encounters:   08/17/22 43.58 kg/m²               Significant Labs:        Component Value Date/Time    WBC 9.23 05/07/2021 0935    HGB 12.2 05/07/2021 0935    HCT 37.6 05/07/2021 0935     05/07/2021 0935     11/29/2021 1440    K 3.9 11/29/2021 1440     11/29/2021 1440    CO2 24 11/29/2021 1440     (H) 11/29/2021 1440    BUN 7 11/29/2021 1440    CREATININE 0.8 11/29/2021 1440    MG 1.8 05/07/2021 0935    CALCIUM 9.0 11/29/2021 1440    ALBUMIN 4.0 11/29/2021 1440    PROT 7.4 11/29/2021 1440    ALKPHOS 77 11/29/2021 1440    BILITOT 0.4 11/29/2021 1440    AST 20 11/29/2021 1440    ALT 17 11/29/2021 1440    INR 1.0 06/03/2021 1550    HGBA1C 4.9 05/07/2021 0935        Please see Results Review for additional labs.     Diagnostic Studies: No relevant studies.    EKG:   Results for orders placed or performed in visit on 06/03/21   EKG 12-lead    Collection Time: 06/03/21  3:29 PM    Narrative    Test Reason : Z01.818,    Vent. Rate : 089 BPM     Atrial Rate : 089 BPM     P-R Int : 172 ms          QRS Dur : 080 ms      QT Int : 358 ms       P-R-T Axes : 047 026 035 degrees     QTc Int : 435 ms    Normal sinus rhythm  Normal ECG  No previous ECGs available  Confirmed by Jay Tolbert MD ([id number]) on 6/8/2021 10:27:38 AM    Referred By:  DR. MONDRAGON           Confirmed By:Jay Tolbert MD       ECHO:  No results found for this or any previous visit.        ASSESSMENT/PLAN:       Pre-op Assessment    I have reviewed the Patient Summary Reports.     I have reviewed the Nursing Notes. I have reviewed the NPO Status.   I have reviewed the Medications.     Review of Systems  Anesthesia Hx:  No problems with previous Anesthesia  History of prior surgery of interest to airway management or planning: Previous anesthesia: General Denies Family Hx of Anesthesia complications.   Denies Personal Hx of Anesthesia complications.   Social:  Smoker, No Alcohol Use    Hematology/Oncology:  Hematology Normal   Oncology Normal      EENT/Dental:EENT/Dental Normal   Cardiovascular:   Hypertension  Denies Angina. RAYNAUDS'S PHENOMENON Functional Capacity unable to determine, WC MOSTLY , CAN TAKE 10-12 STEPS, CAN GO UP 3-4 STEPS WITH ASSISTANCE  limited by disability Hypertension , Recent typical clinic B/P of 144/63    Pulmonary:   Denies Asthma.  Denies Shortness of breath.  Denies Recent URI.  Asthma: (LAST EPISODE 3-4 YEARS AGO)  last episode was > 1 year ago. Emergency visits this year is none.   Renal/:  Renal/ Normal  HAS OVERACTIVE BLADDER   Hepatic/GI:  Hepatic/GI Normal  Esophageal / Stomach Disorders Gerd    Musculoskeletal:  Musculoskeletal General/Symptoms: Functional capacity is wheelchair dependant.  Joint Disease:  Arthritis, Osteoarthritis    Neurological:   Seizures, well controlled HAS VNS Neuro Symptoms of pain OF NECK AND LOWER BACKOsteoarthritis  Seizure Disorder , Most recent seizure occurred < 1 week ago LAST ON 5/31, 3 DAYS AGO CVA - Cerebrovasular Accident , Most recent CVA was on ABOUT 11 YEARS AGO  TIA - Transient Ischemic Attack (DOES NOT KNOW WHEN)    Endocrine:  Endocrine Normal  Metabolic Disorders, Morbid Obesity / BMI > 40  Psych:   Psychiatric History anxiety H/O POLYSUBSTANCE DEPENDENCE Anxiety Disorder.          Physical Exam  General: Well nourished and Cooperative    Airway:  Mallampati: II   Mouth Opening: Normal  TM Distance: Normal  Tongue: Normal  Neck ROM: Normal ROM    Dental:  Intact    Chest/Lungs:  Normal Respiratory Rate    Heart:  Rate: Normal  Rhythm: Regular Rhythm  Sounds: Normal        Anesthesia Plan  Type of Anesthesia, risks & benefits discussed:    Anesthesia Type: Gen ETT, Gen Natural Airway, Gen Supraglottic Airway  Intra-op Monitoring Plan: Standard ASA Monitors  Post Op Pain Control Plan: multimodal analgesia and IV/PO Opioids PRN  Induction:  IV  Airway Plan: Direct, Post-Induction  Informed Consent: Informed consent signed with the Patient and all parties understand the risks  and agree with anesthesia plan.  All questions answered.   ASA Score: 3  Day of Surgery Review of History & Physical: H&P Update referred to the surgeon/provider.    Ready For Surgery From Anesthesia Perspective.     .

## 2022-09-13 NOTE — TELEPHONE ENCOUNTER
----- Message from Alejandra Mcdermott, Patient Care Assistant sent at 9/13/2022  2:34 PM CDT -----  Regarding: time for procedure  Contact: Pt  Pt is requesting a call back in regards to wanting to know the exact time of her procedure. Pt states she was given two different and would like to know what time she should be there for exactly.      Pt @ [phone]

## 2022-09-14 ENCOUNTER — HOSPITAL ENCOUNTER (OUTPATIENT)
Facility: HOSPITAL | Age: 42
Discharge: HOME | End: 2022-09-14
Attending: NEUROLOGICAL SURGERY | Admitting: NEUROLOGICAL SURGERY
Payer: COMMERCIAL

## 2022-09-14 ENCOUNTER — ANESTHESIA (OUTPATIENT)
Dept: SURGERY | Facility: HOSPITAL | Age: 42
End: 2022-09-14
Payer: COMMERCIAL

## 2022-09-14 VITALS
OXYGEN SATURATION: 97 % | DIASTOLIC BLOOD PRESSURE: 62 MMHG | HEIGHT: 63 IN | RESPIRATION RATE: 20 BRPM | TEMPERATURE: 98 F | BODY MASS INDEX: 42.35 KG/M2 | SYSTOLIC BLOOD PRESSURE: 104 MMHG | WEIGHT: 239 LBS | HEART RATE: 79 BPM

## 2022-09-14 DIAGNOSIS — Z96.89 S/P PLACEMENT OF VNS (VAGUS NERVE STIMULATION) DEVICE: Primary | ICD-10-CM

## 2022-09-14 DIAGNOSIS — G40.812 LENNOX-GASTAUT SYNDROME: ICD-10-CM

## 2022-09-14 DIAGNOSIS — I10 HYPERTENSION, UNSPECIFIED TYPE: ICD-10-CM

## 2022-09-14 LAB
ABO + RH BLD: NORMAL
ANION GAP SERPL CALC-SCNC: 10 MMOL/L (ref 8–16)
APTT BLDCRRT: 26.5 SEC (ref 21–32)
BASOPHILS # BLD AUTO: 0.08 K/UL (ref 0–0.2)
BASOPHILS NFR BLD: 0.6 % (ref 0–1.9)
BILIRUB UR QL STRIP: NEGATIVE
BLD GP AB SCN CELLS X3 SERPL QL: NORMAL
BUN SERPL-MCNC: 9 MG/DL (ref 6–20)
CALCIUM SERPL-MCNC: 9.3 MG/DL (ref 8.7–10.5)
CHLORIDE SERPL-SCNC: 103 MMOL/L (ref 95–110)
CLARITY UR REFRACT.AUTO: CLEAR
CO2 SERPL-SCNC: 24 MMOL/L (ref 23–29)
COLOR UR AUTO: YELLOW
CREAT SERPL-MCNC: 0.7 MG/DL (ref 0.5–1.4)
DIFFERENTIAL METHOD: ABNORMAL
EOSINOPHIL # BLD AUTO: 0.2 K/UL (ref 0–0.5)
EOSINOPHIL NFR BLD: 1.7 % (ref 0–8)
ERYTHROCYTE [DISTWIDTH] IN BLOOD BY AUTOMATED COUNT: 11.6 % (ref 11.5–14.5)
EST. GFR  (NO RACE VARIABLE): >60 ML/MIN/1.73 M^2
GLUCOSE SERPL-MCNC: 84 MG/DL (ref 70–110)
GLUCOSE UR QL STRIP: NEGATIVE
HCT VFR BLD AUTO: 39 % (ref 37–48.5)
HGB BLD-MCNC: 12.7 G/DL (ref 12–16)
HGB UR QL STRIP: ABNORMAL
IMM GRANULOCYTES # BLD AUTO: 0.04 K/UL (ref 0–0.04)
IMM GRANULOCYTES NFR BLD AUTO: 0.3 % (ref 0–0.5)
INR PPP: 1 (ref 0.8–1.2)
KETONES UR QL STRIP: NEGATIVE
LEUKOCYTE ESTERASE UR QL STRIP: NEGATIVE
LYMPHOCYTES # BLD AUTO: 3.5 K/UL (ref 1–4.8)
LYMPHOCYTES NFR BLD: 28.2 % (ref 18–48)
MCH RBC QN AUTO: 29 PG (ref 27–31)
MCHC RBC AUTO-ENTMCNC: 32.6 G/DL (ref 32–36)
MCV RBC AUTO: 89 FL (ref 82–98)
MICROSCOPIC COMMENT: NORMAL
MONOCYTES # BLD AUTO: 0.9 K/UL (ref 0.3–1)
MONOCYTES NFR BLD: 6.9 % (ref 4–15)
NEUTROPHILS # BLD AUTO: 7.8 K/UL (ref 1.8–7.7)
NEUTROPHILS NFR BLD: 62.3 % (ref 38–73)
NITRITE UR QL STRIP: NEGATIVE
NRBC BLD-RTO: 0 /100 WBC
PH UR STRIP: 6 [PH] (ref 5–8)
PLATELET # BLD AUTO: 255 K/UL (ref 150–450)
PMV BLD AUTO: 9.8 FL (ref 9.2–12.9)
POTASSIUM SERPL-SCNC: 4.1 MMOL/L (ref 3.5–5.1)
PROT UR QL STRIP: ABNORMAL
PROTHROMBIN TIME: 10 SEC (ref 9–12.5)
RBC # BLD AUTO: 4.38 M/UL (ref 4–5.4)
RBC #/AREA URNS AUTO: 2 /HPF (ref 0–4)
SODIUM SERPL-SCNC: 137 MMOL/L (ref 136–145)
SP GR UR STRIP: >1.03 (ref 1–1.03)
SQUAMOUS #/AREA URNS AUTO: 6 /HPF
URN SPEC COLLECT METH UR: ABNORMAL
WBC # BLD AUTO: 12.47 K/UL (ref 3.9–12.7)
WBC #/AREA URNS AUTO: 2 /HPF (ref 0–5)

## 2022-09-14 PROCEDURE — 25000003 PHARM REV CODE 250: Performed by: STUDENT IN AN ORGANIZED HEALTH CARE EDUCATION/TRAINING PROGRAM

## 2022-09-14 PROCEDURE — 80048 BASIC METABOLIC PNL TOTAL CA: CPT | Performed by: STUDENT IN AN ORGANIZED HEALTH CARE EDUCATION/TRAINING PROGRAM

## 2022-09-14 PROCEDURE — 86901 BLOOD TYPING SEROLOGIC RH(D): CPT | Performed by: NEUROLOGICAL SURGERY

## 2022-09-14 PROCEDURE — D9220A PRA ANESTHESIA: ICD-10-PCS | Mod: ,,, | Performed by: ANESTHESIOLOGY

## 2022-09-14 PROCEDURE — 85025 COMPLETE CBC W/AUTO DIFF WBC: CPT | Performed by: STUDENT IN AN ORGANIZED HEALTH CARE EDUCATION/TRAINING PROGRAM

## 2022-09-14 PROCEDURE — 37000009 HC ANESTHESIA EA ADD 15 MINS: Performed by: NEUROLOGICAL SURGERY

## 2022-09-14 PROCEDURE — 63600175 PHARM REV CODE 636 W HCPCS: Performed by: STUDENT IN AN ORGANIZED HEALTH CARE EDUCATION/TRAINING PROGRAM

## 2022-09-14 PROCEDURE — 64569 REVISE/REPL VAGUS N ELTRD: CPT | Mod: LT,,, | Performed by: NEUROLOGICAL SURGERY

## 2022-09-14 PROCEDURE — D9220A PRA ANESTHESIA: Mod: ,,, | Performed by: ANESTHESIOLOGY

## 2022-09-14 PROCEDURE — 71000015 HC POSTOP RECOV 1ST HR: Performed by: NEUROLOGICAL SURGERY

## 2022-09-14 PROCEDURE — 25000003 PHARM REV CODE 250: Performed by: NEUROLOGICAL SURGERY

## 2022-09-14 PROCEDURE — 37000008 HC ANESTHESIA 1ST 15 MINUTES: Performed by: NEUROLOGICAL SURGERY

## 2022-09-14 PROCEDURE — 81001 URINALYSIS AUTO W/SCOPE: CPT | Performed by: STUDENT IN AN ORGANIZED HEALTH CARE EDUCATION/TRAINING PROGRAM

## 2022-09-14 PROCEDURE — 27201037 HC PRESSURE MONITORING SET UP

## 2022-09-14 PROCEDURE — 27201423 OPTIME MED/SURG SUP & DEVICES STERILE SUPPLY: Performed by: NEUROLOGICAL SURGERY

## 2022-09-14 PROCEDURE — 36000707: Performed by: NEUROLOGICAL SURGERY

## 2022-09-14 PROCEDURE — 36000706: Performed by: NEUROLOGICAL SURGERY

## 2022-09-14 PROCEDURE — 85730 THROMBOPLASTIN TIME PARTIAL: CPT | Performed by: STUDENT IN AN ORGANIZED HEALTH CARE EDUCATION/TRAINING PROGRAM

## 2022-09-14 PROCEDURE — 64569 PR REVISE/REPLACE CRANIAL NERVE STIM ELECTRODE/CONNECT PULSE GEN: ICD-10-PCS | Mod: LT,,, | Performed by: NEUROLOGICAL SURGERY

## 2022-09-14 PROCEDURE — 85610 PROTHROMBIN TIME: CPT | Performed by: STUDENT IN AN ORGANIZED HEALTH CARE EDUCATION/TRAINING PROGRAM

## 2022-09-14 PROCEDURE — 71000016 HC POSTOP RECOV ADDL HR: Performed by: NEUROLOGICAL SURGERY

## 2022-09-14 PROCEDURE — 71000045 HC DOSC ROUTINE RECOVERY EA ADD'L HR: Performed by: NEUROLOGICAL SURGERY

## 2022-09-14 PROCEDURE — 71000044 HC DOSC ROUTINE RECOVERY FIRST HOUR: Performed by: NEUROLOGICAL SURGERY

## 2022-09-14 RX ORDER — BACITRACIN ZINC 500 UNIT/G
OINTMENT (GRAM) TOPICAL
Status: DISCONTINUED | OUTPATIENT
Start: 2022-09-14 | End: 2022-09-14 | Stop reason: HOSPADM

## 2022-09-14 RX ORDER — PROPOFOL 10 MG/ML
VIAL (ML) INTRAVENOUS
Status: DISCONTINUED | OUTPATIENT
Start: 2022-09-14 | End: 2022-09-14

## 2022-09-14 RX ORDER — ACETAMINOPHEN 10 MG/ML
INJECTION, SOLUTION INTRAVENOUS
Status: DISCONTINUED | OUTPATIENT
Start: 2022-09-14 | End: 2022-09-14

## 2022-09-14 RX ORDER — MIDAZOLAM HYDROCHLORIDE 1 MG/ML
INJECTION, SOLUTION INTRAMUSCULAR; INTRAVENOUS
Status: DISCONTINUED | OUTPATIENT
Start: 2022-09-14 | End: 2022-09-14

## 2022-09-14 RX ORDER — CEPHALEXIN 500 MG/1
500 CAPSULE ORAL EVERY 8 HOURS
Qty: 15 CAPSULE | Refills: 0 | Status: SHIPPED | OUTPATIENT
Start: 2022-09-14 | End: 2022-09-19

## 2022-09-14 RX ORDER — SODIUM CHLORIDE 0.9 % (FLUSH) 0.9 %
10 SYRINGE (ML) INJECTION
Status: DISCONTINUED | OUTPATIENT
Start: 2022-09-14 | End: 2022-09-14 | Stop reason: HOSPADM

## 2022-09-14 RX ORDER — ONDANSETRON 2 MG/ML
INJECTION INTRAMUSCULAR; INTRAVENOUS
Status: DISCONTINUED | OUTPATIENT
Start: 2022-09-14 | End: 2022-09-14

## 2022-09-14 RX ORDER — SODIUM CHLORIDE 9 MG/ML
INJECTION, SOLUTION INTRAVENOUS CONTINUOUS
Status: DISCONTINUED | OUTPATIENT
Start: 2022-09-14 | End: 2022-09-14 | Stop reason: HOSPADM

## 2022-09-14 RX ORDER — CEFAZOLIN SODIUM/WATER 2 G/20 ML
2 SYRINGE (ML) INTRAVENOUS
Status: COMPLETED | OUTPATIENT
Start: 2022-09-14 | End: 2022-09-14

## 2022-09-14 RX ORDER — MUPIROCIN 20 MG/G
OINTMENT TOPICAL
Status: DISCONTINUED | OUTPATIENT
Start: 2022-09-14 | End: 2022-09-14 | Stop reason: HOSPADM

## 2022-09-14 RX ORDER — FENTANYL CITRATE 50 UG/ML
INJECTION, SOLUTION INTRAMUSCULAR; INTRAVENOUS
Status: DISCONTINUED | OUTPATIENT
Start: 2022-09-14 | End: 2022-09-14

## 2022-09-14 RX ORDER — KETAMINE HCL IN 0.9 % NACL 50 MG/5 ML
SYRINGE (ML) INTRAVENOUS
Status: DISCONTINUED | OUTPATIENT
Start: 2022-09-14 | End: 2022-09-14

## 2022-09-14 RX ORDER — OXYCODONE AND ACETAMINOPHEN 5; 325 MG/1; MG/1
1 TABLET ORAL EVERY 4 HOURS PRN
Qty: 28 TABLET | Refills: 0 | Status: SHIPPED | OUTPATIENT
Start: 2022-09-14 | End: 2022-09-15

## 2022-09-14 RX ORDER — ROCURONIUM BROMIDE 10 MG/ML
INJECTION, SOLUTION INTRAVENOUS
Status: DISCONTINUED | OUTPATIENT
Start: 2022-09-14 | End: 2022-09-14

## 2022-09-14 RX ORDER — PHENYLEPHRINE HYDROCHLORIDE 10 MG/ML
INJECTION INTRAVENOUS
Status: DISCONTINUED | OUTPATIENT
Start: 2022-09-14 | End: 2022-09-14

## 2022-09-14 RX ORDER — VASOPRESSIN 20 [USP'U]/ML
INJECTION, SOLUTION INTRAMUSCULAR; SUBCUTANEOUS
Status: DISCONTINUED | OUTPATIENT
Start: 2022-09-14 | End: 2022-09-14

## 2022-09-14 RX ORDER — LIDOCAINE HYDROCHLORIDE AND EPINEPHRINE 20; 10 MG/ML; UG/ML
INJECTION, SOLUTION INFILTRATION; PERINEURAL
Status: DISCONTINUED | OUTPATIENT
Start: 2022-09-14 | End: 2022-09-14 | Stop reason: HOSPADM

## 2022-09-14 RX ORDER — HALOPERIDOL 5 MG/ML
0.5 INJECTION INTRAMUSCULAR EVERY 10 MIN PRN
Status: DISCONTINUED | OUTPATIENT
Start: 2022-09-14 | End: 2022-09-14 | Stop reason: HOSPADM

## 2022-09-14 RX ORDER — MUPIROCIN 20 MG/G
1 OINTMENT TOPICAL 2 TIMES DAILY
Status: DISCONTINUED | OUTPATIENT
Start: 2022-09-14 | End: 2022-09-14 | Stop reason: HOSPADM

## 2022-09-14 RX ORDER — LIDOCAINE HYDROCHLORIDE 20 MG/ML
INJECTION INTRAVENOUS
Status: DISCONTINUED | OUTPATIENT
Start: 2022-09-14 | End: 2022-09-14

## 2022-09-14 RX ORDER — DEXAMETHASONE SODIUM PHOSPHATE 4 MG/ML
INJECTION, SOLUTION INTRA-ARTICULAR; INTRALESIONAL; INTRAMUSCULAR; INTRAVENOUS; SOFT TISSUE
Status: DISCONTINUED | OUTPATIENT
Start: 2022-09-14 | End: 2022-09-14

## 2022-09-14 RX ORDER — HYDROMORPHONE HYDROCHLORIDE 1 MG/ML
0.2 INJECTION, SOLUTION INTRAMUSCULAR; INTRAVENOUS; SUBCUTANEOUS EVERY 5 MIN PRN
Status: DISCONTINUED | OUTPATIENT
Start: 2022-09-14 | End: 2022-09-14 | Stop reason: HOSPADM

## 2022-09-14 RX ADMIN — PHENYLEPHRINE HYDROCHLORIDE 100 MCG: 10 INJECTION INTRAVENOUS at 08:09

## 2022-09-14 RX ADMIN — Medication 30 MG: at 08:09

## 2022-09-14 RX ADMIN — HYDROMORPHONE HYDROCHLORIDE 0.2 MG: 1 INJECTION, SOLUTION INTRAMUSCULAR; INTRAVENOUS; SUBCUTANEOUS at 10:09

## 2022-09-14 RX ADMIN — LIDOCAINE HYDROCHLORIDE 75 MG: 20 INJECTION INTRAVENOUS at 08:09

## 2022-09-14 RX ADMIN — ROCURONIUM BROMIDE 10 MG: 10 INJECTION INTRAVENOUS at 08:09

## 2022-09-14 RX ADMIN — PHENYLEPHRINE HYDROCHLORIDE 100 MCG: 10 INJECTION INTRAVENOUS at 09:09

## 2022-09-14 RX ADMIN — FENTANYL CITRATE 100 MCG: 50 INJECTION, SOLUTION INTRAMUSCULAR; INTRAVENOUS at 08:09

## 2022-09-14 RX ADMIN — MIDAZOLAM HYDROCHLORIDE 2 MG: 1 INJECTION, SOLUTION INTRAMUSCULAR; INTRAVENOUS at 08:09

## 2022-09-14 RX ADMIN — Medication 2 G: at 08:09

## 2022-09-14 RX ADMIN — VASOPRESSIN 2 UNITS: 20 INJECTION INTRAVENOUS at 09:09

## 2022-09-14 RX ADMIN — ROCURONIUM BROMIDE 40 MG: 10 INJECTION INTRAVENOUS at 08:09

## 2022-09-14 RX ADMIN — ACETAMINOPHEN 1000 MG: 10 INJECTION INTRAVENOUS at 08:09

## 2022-09-14 RX ADMIN — PROPOFOL 20 MG: 10 INJECTION, EMULSION INTRAVENOUS at 08:09

## 2022-09-14 RX ADMIN — VASOPRESSIN 1 UNITS: 20 INJECTION INTRAVENOUS at 09:09

## 2022-09-14 RX ADMIN — HYDROMORPHONE HYDROCHLORIDE 0.2 MG: 1 INJECTION, SOLUTION INTRAMUSCULAR; INTRAVENOUS; SUBCUTANEOUS at 11:09

## 2022-09-14 RX ADMIN — ONDANSETRON 4 MG: 2 INJECTION INTRAMUSCULAR; INTRAVENOUS at 09:09

## 2022-09-14 RX ADMIN — MUPIROCIN: 20 OINTMENT TOPICAL at 06:09

## 2022-09-14 RX ADMIN — VASOPRESSIN 1 UNITS: 20 INJECTION INTRAVENOUS at 08:09

## 2022-09-14 RX ADMIN — PROPOFOL 150 MG: 10 INJECTION, EMULSION INTRAVENOUS at 08:09

## 2022-09-14 RX ADMIN — DEXAMETHASONE SODIUM PHOSPHATE 4 MG: 4 INJECTION INTRA-ARTICULAR; INTRALESIONAL; INTRAMUSCULAR; INTRAVENOUS; SOFT TISSUE at 08:09

## 2022-09-14 RX ADMIN — SODIUM CHLORIDE: 0.9 INJECTION, SOLUTION INTRAVENOUS at 07:09

## 2022-09-14 RX ADMIN — SUGAMMADEX 200 MG: 100 INJECTION, SOLUTION INTRAVENOUS at 09:09

## 2022-09-14 NOTE — BRIEF OP NOTE
James Dimas - Surgery ([facility])  Brief Operative Note    Surgery Date: 9/14/2022     Surgeon(s) and Role:     * Berto Corona MD - Primary     * Summer Baldwin MD - Resident - Assisting        Pre-op Diagnosis:  Localization-related (focal) (partial) symptomatic epilepsy and epileptic syndromes with complex partial seizures, intractable, with status epilepticus [G40.211]    Post-op Diagnosis:  Post-Op Diagnosis Codes:     * Localization-related (focal) (partial) symptomatic epilepsy and epileptic syndromes with complex partial seizures, intractable, with status epilepticus [G40.211]    Procedure(s) (LRB):  REPLACEMENT, BATTERY, NEUROSTIMULATOR, VAGAL- revision (Left)    Anesthesia: General    Operative Findings: L sided VNS revision. Wires were found to be tethered in between neck and chest incision, so the adhesions were split to free the leads up. The battery was tacked down to reduce movement as well. Incisions closed with monocryl and glue.    Estimated Blood Loss: * No values recorded between 9/14/2022  8:58 AM and 9/14/2022 10:27 AM *         Specimens:   Specimen (24h ago, onward)      None              Discharge Note    OUTCOME: Patient tolerated treatment/procedure well without complication and is now ready for discharge.    DISPOSITION: Home or Self Care    FINAL DIAGNOSIS:  <principal problem not specified>    FOLLOWUP: In clinic    DISCHARGE INSTRUCTIONS:  No discharge procedures on file.

## 2022-09-14 NOTE — ANESTHESIA PROCEDURE NOTES
Intubation    Date/Time: 9/14/2022 8:12 AM  Performed by: Dash Rucker DO  Authorized by: Andrew Velasquez MD     Intubation:     Induction:  Intravenous    Intubated:  Postinduction    Mask Ventilation:  Easy with oral airway    Attempts:  1    Attempted By:  Resident anesthesiologist    Method of Intubation:  Direct    Blade:  Montero 2    Laryngeal View Grade: Grade I - full view of cords      Difficult Airway Encountered?: No      Complications:  None    Airway Device:  Oral endotracheal tube    Airway Device Size:  7.0    Style/Cuff Inflation:  Cuffed (inflated to minimal occlusive pressure)    Tube secured:  23    Secured at:  The lips    Placement Verified By:  Capnometry    Complicating Factors:  None    Findings Post-Intubation:  BS equal bilateral

## 2022-09-14 NOTE — PATIENT INSTRUCTIONS
Follow up in Dr. Corona's clinic in 2 weeks to check on your incisions.     Take pain medications only as needed. Please take keflex 3 times a day for 5 days to prevent infection. You may shower two days after surgery, but pat incisions dry. No baths or swimming.

## 2022-09-14 NOTE — INTERVAL H&P NOTE
The patient has been examined and the H&P has been reviewed:    I concur with the findings and no changes have occurred since H&P was written.    Surgery risks, benefits and alternative options discussed and understood by patient/family.      Proceed to OR as planned for VNS revision    There are no hospital problems to display for this patient.

## 2022-09-14 NOTE — PLAN OF CARE
Discharge instructions given to patient, spouse and daughter. Copies provided. Verbalized understanding. VSS. No c/o nausea. Pain level improved. No s/s of distress noted. Surgical sites x2 C/D/I. RX x2 being filled and brought to bedside. Patient meets criteria for discharge.

## 2022-09-14 NOTE — TRANSFER OF CARE
"Anesthesia Transfer of Care Note    Patient: Annie Baird    Procedure(s) Performed: Procedure(s) (LRB):  REPLACEMENT, BATTERY, NEUROSTIMULATOR, VAGAL- revision (Left)    Patient location: [facility]    Anesthesia Type: general    Transport from OR: Transported from OR on 6-10 L/min O2 by face mask with adequate spontaneous ventilation    Post pain: adequate analgesia    Post assessment: tolerated procedure well and no apparent anesthetic complications    Post vital signs: stable    Level of consciousness: awake    Nausea/Vomiting: no nausea/vomiting    Complications: none    Transfer of care protocol was followed      Last vitals:   Visit Vitals  /80 (BP Location: Left arm, Patient Position: Lying)   Pulse 74   Temp 36.8 °C (98.2 °F) (Oral)   Resp 18   Ht 5' 3" (1.6 m)   Wt 108.4 kg (239 lb)   LMP 06/17/2022   SpO2 100%   Breastfeeding No   BMI 42.34 kg/m²     "

## 2022-09-15 ENCOUNTER — PATIENT MESSAGE (OUTPATIENT)
Dept: NEUROSURGERY | Facility: CLINIC | Age: 42
End: 2022-09-15
Payer: COMMERCIAL

## 2022-09-15 RX ORDER — OXYCODONE AND ACETAMINOPHEN 7.5; 325 MG/1; MG/1
1 TABLET ORAL EVERY 4 HOURS PRN
Qty: 30 TABLET | Refills: 0 | Status: SHIPPED | OUTPATIENT
Start: 2022-09-15 | End: 2022-09-20 | Stop reason: ALTCHOICE

## 2022-09-15 NOTE — PROGRESS NOTES
Patient called in increased pain POD1 from VNS revision. Denies significant swelling or drainage from incisions or bruising. Discussed with patient that we will increased percocet to 7.5 mg q 4 hours for one week in the acute post op period. The medication will then be tapered down.     Tyra Laird PA-C   [phone]  Neurosurgery  [facility]

## 2022-09-15 NOTE — OP NOTE
James Dimas - Surgery ([facility])  Neurosurgery  Operative Note    OP Note      Date of Procedure: 9/14/2022       Pre-Operative Diagnosis:  Vagal nerve stimulator malfunction      Post-Operative Diagnosis: same      Anesthesia: General    Procedures performed:  Vagal nerve stimulator revision an untethering of vagal nerve stimulator lead     Surgeon: Berto Corona MD    Assistant:: Summer Baldwin MD    Indication for Procedure:  This is a 42-year-old female who is a vagal nerve stimulator in place that is functioning well however patient has continued to have significant amount of pain with turning of the neck and there appeared to be a tethering of the lead at the neck area between the neck incision and the clavicle.  We felt this needed cervical surgical intervention after medical therapy with pain management fail    Operative Note:  Patient was anesthetized intubated by anesthesia.  Was placed in supine position.  Neck and chest was prepped and draped sterile fashion we 1st started by opening the neck area.  We were very careful not use the Bovie since the pulse generator was still in play.  We dissected down found the leads of the neck.  We used 15 blade and sharp dissection to dissect out the lead from the scarring tissue.  We used Metzenbaum to be able to track the lead down toward the clavicle area we freed that up but could not free up the entire length we then made a separate incision over the pulse generator we used a plasma knife and avoid using any Bovie cautery to preserve pulse generator we dissected through the posterior and right out dissected the leads free and then dissected from the bottom up until we were able to meet and free up the lead completely from top to bottom.  We left an extra loop of the top to give us some play we irrigated out both wounds closed both wounds placed a pressure dressing on the pulse generator to avoid hematoma sterile dressing put in place patient was extubated brought to  recovery room without any problems or complication.    EBL:  Minimal  Specimen Sent:  None

## 2022-09-19 ENCOUNTER — TELEPHONE (OUTPATIENT)
Dept: NEUROLOGY | Facility: CLINIC | Age: 42
End: 2022-09-19
Payer: COMMERCIAL

## 2022-09-19 NOTE — TELEPHONE ENCOUNTER
----- Message from Leopoldo Conway sent at 9/16/2022  4:36 PM CDT -----  Regarding: med questions  Type:  Pharmacy Calling to Clarify an RX    Name of Caller:  Tran    Pharmacy Name:    [facility]    Prescription Name:    EPIDIOLEX 100 mg/mL 492 mL 4 5/17/2022    Sig: GIVE 8.2 ML BY MOUTH 2 TIMES A DAY.   Sent to pharmacy as: EPIDIOLEX 100 mg/mL   E-Prescribing Status: Receipt confirmed by pharmacy (5/17/2022  3:01 PM CDT)   No prior authorization was found for this prescription.   Found prior authorization for another prescription for the same medication: Waiting for Auth Details     What do they need to clarify?:  needs to know if PT Still taking med. If  so needs pa to fill.    Best Call Back Number:  [phone]    Additional Information:

## 2022-09-20 ENCOUNTER — PATIENT MESSAGE (OUTPATIENT)
Dept: NEUROSURGERY | Facility: CLINIC | Age: 42
End: 2022-09-20
Payer: COMMERCIAL

## 2022-09-20 RX ORDER — OXYCODONE AND ACETAMINOPHEN 5; 325 MG/1; MG/1
1 TABLET ORAL EVERY 6 HOURS PRN
Qty: 28 TABLET | Refills: 0 | Status: SHIPPED | OUTPATIENT
Start: 2022-09-20 | End: 2022-10-13

## 2022-09-21 ENCOUNTER — OFFICE VISIT (OUTPATIENT)
Dept: INTERNAL MEDICINE | Facility: CLINIC | Age: 42
End: 2022-09-21
Payer: COMMERCIAL

## 2022-09-21 VITALS
TEMPERATURE: 98 F | SYSTOLIC BLOOD PRESSURE: 118 MMHG | BODY MASS INDEX: 42.18 KG/M2 | DIASTOLIC BLOOD PRESSURE: 72 MMHG | HEART RATE: 81 BPM | OXYGEN SATURATION: 98 % | WEIGHT: 238.13 LBS

## 2022-09-21 DIAGNOSIS — J45.20 MILD INTERMITTENT ASTHMA WITHOUT COMPLICATION: ICD-10-CM

## 2022-09-21 DIAGNOSIS — G89.4 CHRONIC PAIN SYNDROME: ICD-10-CM

## 2022-09-21 DIAGNOSIS — Z96.89 S/P PLACEMENT OF VNS (VAGUS NERVE STIMULATION) DEVICE: ICD-10-CM

## 2022-09-21 DIAGNOSIS — I10 HYPERTENSION, UNSPECIFIED TYPE: ICD-10-CM

## 2022-09-21 DIAGNOSIS — I69.354 HEMIPARESIS AFFECTING LEFT SIDE AS LATE EFFECT OF STROKE: ICD-10-CM

## 2022-09-21 DIAGNOSIS — N32.81 OVERACTIVE BLADDER: ICD-10-CM

## 2022-09-21 DIAGNOSIS — Z00.00 WELL ADULT EXAM: Primary | ICD-10-CM

## 2022-09-21 DIAGNOSIS — J30.89 NON-SEASONAL ALLERGIC RHINITIS, UNSPECIFIED TRIGGER: ICD-10-CM

## 2022-09-21 DIAGNOSIS — E66.01 SEVERE OBESITY (BMI >= 40): ICD-10-CM

## 2022-09-21 DIAGNOSIS — I73.00 RAYNAUD'S PHENOMENON WITHOUT GANGRENE: ICD-10-CM

## 2022-09-21 DIAGNOSIS — M48.061 SPINAL STENOSIS OF LUMBAR REGION, UNSPECIFIED WHETHER NEUROGENIC CLAUDICATION PRESENT: ICD-10-CM

## 2022-09-21 DIAGNOSIS — I10 PRIMARY HYPERTENSION: ICD-10-CM

## 2022-09-21 DIAGNOSIS — F33.1 DEPRESSION, MAJOR, RECURRENT, MODERATE: ICD-10-CM

## 2022-09-21 DIAGNOSIS — E78.2 MIXED HYPERLIPIDEMIA: ICD-10-CM

## 2022-09-21 DIAGNOSIS — E53.8 B12 DEFICIENCY: ICD-10-CM

## 2022-09-21 DIAGNOSIS — G40.812 NONINTRACTABLE LENNOX-GASTAUT SYNDROME WITHOUT STATUS EPILEPTICUS: ICD-10-CM

## 2022-09-21 DIAGNOSIS — K21.9 GASTROESOPHAGEAL REFLUX DISEASE, UNSPECIFIED WHETHER ESOPHAGITIS PRESENT: ICD-10-CM

## 2022-09-21 DIAGNOSIS — F41.1 GAD (GENERALIZED ANXIETY DISORDER): ICD-10-CM

## 2022-09-21 PROBLEM — E66.813 CLASS 3 SEVERE OBESITY DUE TO EXCESS CALORIES WITH SERIOUS COMORBIDITY AND BODY MASS INDEX (BMI) OF 40.0 TO 44.9 IN ADULT: Status: ACTIVE | Noted: 2021-05-07

## 2022-09-21 PROCEDURE — 3074F SYST BP LT 130 MM HG: CPT | Mod: CPTII,S$GLB,, | Performed by: PEDIATRICS

## 2022-09-21 PROCEDURE — 3008F BODY MASS INDEX DOCD: CPT | Mod: CPTII,S$GLB,, | Performed by: PEDIATRICS

## 2022-09-21 PROCEDURE — 3074F PR MOST RECENT SYSTOLIC BLOOD PRESSURE < 130 MM HG: ICD-10-PCS | Mod: CPTII,S$GLB,, | Performed by: PEDIATRICS

## 2022-09-21 PROCEDURE — 99499 RISK ADDL DX/OHS AUDIT: ICD-10-PCS | Mod: S$GLB,,, | Performed by: PEDIATRICS

## 2022-09-21 PROCEDURE — 99499 UNLISTED E&M SERVICE: CPT | Mod: S$GLB,,, | Performed by: PEDIATRICS

## 2022-09-21 PROCEDURE — 99215 PR OFFICE/OUTPT VISIT, EST, LEVL V, 40-54 MIN: ICD-10-PCS | Mod: 25,S$GLB,, | Performed by: PEDIATRICS

## 2022-09-21 PROCEDURE — 90686 FLU VACCINE (QUAD) GREATER THAN OR EQUAL TO 3YO PRESERVATIVE FREE IM: ICD-10-PCS | Mod: S$GLB,,, | Performed by: PEDIATRICS

## 2022-09-21 PROCEDURE — 99215 OFFICE O/P EST HI 40 MIN: CPT | Mod: 25,S$GLB,, | Performed by: PEDIATRICS

## 2022-09-21 PROCEDURE — 1159F PR MEDICATION LIST DOCUMENTED IN MEDICAL RECORD: ICD-10-PCS | Mod: CPTII,S$GLB,, | Performed by: PEDIATRICS

## 2022-09-21 PROCEDURE — 3078F DIAST BP <80 MM HG: CPT | Mod: CPTII,S$GLB,, | Performed by: PEDIATRICS

## 2022-09-21 PROCEDURE — 99999 PR PBB SHADOW E&M-EST. PATIENT-LVL V: ICD-10-PCS | Mod: PBBFAC,,, | Performed by: PEDIATRICS

## 2022-09-21 PROCEDURE — 4010F ACE/ARB THERAPY RXD/TAKEN: CPT | Mod: CPTII,S$GLB,, | Performed by: PEDIATRICS

## 2022-09-21 PROCEDURE — 90471 FLU VACCINE (QUAD) GREATER THAN OR EQUAL TO 3YO PRESERVATIVE FREE IM: ICD-10-PCS | Mod: S$GLB,,, | Performed by: PEDIATRICS

## 2022-09-21 PROCEDURE — 4010F PR ACE/ARB THEARPY RXD/TAKEN: ICD-10-PCS | Mod: CPTII,S$GLB,, | Performed by: PEDIATRICS

## 2022-09-21 PROCEDURE — 90471 IMMUNIZATION ADMIN: CPT | Mod: S$GLB,,, | Performed by: PEDIATRICS

## 2022-09-21 PROCEDURE — 90686 IIV4 VACC NO PRSV 0.5 ML IM: CPT | Mod: S$GLB,,, | Performed by: PEDIATRICS

## 2022-09-21 PROCEDURE — 1159F MED LIST DOCD IN RCRD: CPT | Mod: CPTII,S$GLB,, | Performed by: PEDIATRICS

## 2022-09-21 PROCEDURE — 3078F PR MOST RECENT DIASTOLIC BLOOD PRESSURE < 80 MM HG: ICD-10-PCS | Mod: CPTII,S$GLB,, | Performed by: PEDIATRICS

## 2022-09-21 PROCEDURE — 3008F PR BODY MASS INDEX (BMI) DOCUMENTED: ICD-10-PCS | Mod: CPTII,S$GLB,, | Performed by: PEDIATRICS

## 2022-09-21 PROCEDURE — 99999 PR PBB SHADOW E&M-EST. PATIENT-LVL V: CPT | Mod: PBBFAC,,, | Performed by: PEDIATRICS

## 2022-09-21 RX ORDER — MONTELUKAST SODIUM 10 MG/1
10 TABLET ORAL NIGHTLY
Qty: 90 TABLET | Refills: 3 | Status: SHIPPED | OUTPATIENT
Start: 2022-09-21 | End: 2022-12-01 | Stop reason: SDUPTHER

## 2022-09-21 RX ORDER — OMEPRAZOLE 40 MG/1
40 CAPSULE, DELAYED RELEASE ORAL EVERY MORNING
Qty: 90 CAPSULE | Refills: 3 | Status: SHIPPED | OUTPATIENT
Start: 2022-09-21 | End: 2022-12-01 | Stop reason: SDUPTHER

## 2022-09-21 RX ORDER — LOSARTAN POTASSIUM 50 MG/1
50 TABLET ORAL NIGHTLY
Qty: 90 TABLET | Refills: 3 | Status: SHIPPED | OUTPATIENT
Start: 2022-09-21 | End: 2022-12-01 | Stop reason: SDUPTHER

## 2022-09-21 RX ORDER — NAPROXEN 500 MG/1
500 TABLET ORAL 2 TIMES DAILY PRN
COMMUNITY
Start: 2022-08-23

## 2022-09-21 RX ORDER — METOPROLOL SUCCINATE 50 MG/1
50 TABLET, EXTENDED RELEASE ORAL DAILY
Qty: 90 TABLET | Refills: 3 | Status: SHIPPED | OUTPATIENT
Start: 2022-09-21 | End: 2022-12-01 | Stop reason: SDUPTHER

## 2022-09-21 RX ORDER — MELOXICAM 15 MG/1
TABLET ORAL
COMMUNITY
Start: 2022-06-21 | End: 2022-10-13

## 2022-09-21 NOTE — ANESTHESIA POSTPROCEDURE EVALUATION
Anesthesia Post Evaluation    Patient: Annie Baird    Procedure(s) Performed: Procedure(s) (LRB):  REPLACEMENT, BATTERY, NEUROSTIMULATOR, VAGAL- revision (Left)    Final Anesthesia Type: general      Patient location during evaluation: [facility]  Patient participation: Yes- Able to Participate  Level of consciousness: awake and alert and oriented  Post-procedure vital signs: reviewed and stable  Pain management: adequate  Airway patency: patent    PONV status at discharge: No PONV  Anesthetic complications: no      Cardiovascular status: hemodynamically stable  Respiratory status: unassisted, spontaneous ventilation and room air  Hydration status: euvolemic  Follow-up not needed.          Vitals Value Taken Time   /62 09/14/22 1216   Temp 36.6 °C (97.9 °F) 09/14/22 1030   Pulse 75 09/14/22 1219   Resp 37 09/14/22 1219   SpO2 99 % 09/14/22 1219   Vitals shown include unvalidated device data.      No case tracking events are documented in the log.      Pain/Miguelina Score: No data recorded

## 2022-09-21 NOTE — PROGRESS NOTES
Subjective:       Patient ID: Annie Baird is a 42 y.o. female.    Chief Complaint: Establish Care    Annie Baird is a 42 y.o. female who presents to clinic to establish care    PMHx, PSHx, SocHx, and FHx reviewed and discussed with patient.       Gastroesophageal reflux disease: on PPI succsefuly      Mild intermittent asthma without complication: currently quiet     Seizure disorder/Lennox-Gastaut syndrome: S/P placement of VNS (vagus nerve stimulation) device, followed by Neuro in [facility] and [facility]     Chronic midline low back pain: seeing chronic pain Dr. Darby, [place]  website reviewed     Primary hypertension: complaint with medications, BP generally good     Hemiparesis affecting left side as late effect of stroke: years ago, Pt does not know cause     Polysubstance dependence: currently off     ALESSIA (generalized anxiety disorder)/depression/insomnia: seeing Psych through telemed in [facility]     Overactive bladder: Pt symptomatic, has accidents, currently not on meds, will check her previous records for Tx and let me know     B12 deficiency: on oral     Raynaud's phenomenon: currentl yonly with cold weather, needs to establish Rhuem here     Non-seasonal allergic rhinitis: PRN Tx     Mixed hyperlipidemia: currently trying lifestyle mod, down 60lbs    Past Surgical History:  No date: APPENDECTOMY  No date:  SECTION  2021: EPIDURAL STEROID INJECTION INTO LUMBAR SPINE; N/A      Comment:  Procedure: Injection-steroid-epidural-lumbar;  Surgeon:                Yolie Lebron MD;  [facility];  Service: Pain                Management;  Laterality: N/A;  L5-S1   2022: INJECTION OF JOINT; Left      Comment:  Procedure: Injection, Joint Sacroiliac Left and GTB;                 Surgeon: Yolie Lebron MD;  [facility];                 Service: Pain Management;  Laterality: Left;  No date: JOINT REPLACEMENT  2022: REPLACEMENT OF BATTERY OF VAGUS NERVE STIMULATOR; Left       Comment:  Procedure: REPLACEMENT, BATTERY, NEUROSTIMULATOR, VAGAL-               revision;  Surgeon: Berto Corona MD;  Location: [facility] OR                [address] [place] [address];  Service: Neurosurgery;  Laterality: Left;  06/2021: VAGAL NERVE STIMULATOR placement; Left    Review of patient's family history indicates:      Social History    Socioeconomic History      Marital status: Legally       Number of children: 2    Tobacco Use      Smoking status: Every Day        Packs/day: 1.50        Years: 20.00        Pack years: 30        Types: Vaping with nicotine, Cigarettes      Smokeless tobacco: Never      Tobacco comments: started at age 22 daily use    Substance and Sexual Activity      Alcohol use: Not Currently      Drug use: Never      Sexual activity: Yes        Partners: Male        Birth control/protection: None    Social Determinants of Health  Financial Resource Strain: Low Risk       Difficulty of Paying Living Expenses: Not very hard  Food Insecurity: No Food Insecurity      Worried About Running Out of Food in the Last Year: Never true      Ran Out of Food in the Last Year: Never true  Transportation Needs: Unmet Transportation Needs      Lack of Transportation (Medical): Yes      Lack of Transportation (Non-Medical): No  Physical Activity: Insufficiently Active      Days of Exercise per Week: 7 days      Minutes of Exercise per Session: 20 min  Stress: Stress Concern Present      Feeling of Stress : Very much  Social Connections: Socially Isolated      Frequency of Communication with Friends and Family: Twice a week      Frequency of Social Gatherings with Friends and Family: Twice a week      Attends Taoist Services: Never      Active Member of Clubs or Organizations: No      Attends Club or Organization Meetings: Never      Marital Status:   Housing Stability: Low Risk       Unable to Pay for Housing in the Last Year: No      Number of Places Lived in the Last Year: 2      Unstable Housing in  the Last Year: No         Review of Systems   Constitutional:  Negative for activity change, appetite change, chills, diaphoresis, fatigue, fever and unexpected weight change.   HENT:  Negative for nasal congestion, ear pain, mouth sores, nosebleeds, postnasal drip, rhinorrhea, sneezing and sore throat.    Eyes:  Negative for photophobia, pain, discharge, redness and visual disturbance.   Respiratory:  Negative for cough, chest tightness, shortness of breath, wheezing and stridor.    Cardiovascular:  Negative for chest pain, palpitations and leg swelling.   Gastrointestinal:  Negative for constipation, diarrhea, nausea and vomiting.   Genitourinary:  Negative for decreased urine volume, difficulty urinating, dysuria, flank pain, frequency, hematuria and urgency.   Musculoskeletal:  Negative for arthralgias, back pain, joint swelling, neck pain and neck stiffness.   Integumentary:  Negative for color change and rash.   Neurological:  Negative for dizziness, syncope, speech difficulty, weakness, light-headedness and headaches.   Hematological:  Negative for adenopathy. Does not bruise/bleed easily.   Psychiatric/Behavioral:  Negative for confusion, decreased concentration, dysphoric mood, hallucinations, sleep disturbance and suicidal ideas. The patient is not nervous/anxious.    All other systems reviewed and are negative.      Objective:      Physical Exam  Vitals and nursing note reviewed.   Constitutional:       General: She is not in acute distress.     Appearance: She is well-developed.   Neck:      Thyroid: No thyromegaly.      Vascular: No JVD.   Cardiovascular:      Rate and Rhythm: Normal rate and regular rhythm.      Heart sounds: Normal heart sounds. No murmur heard.  Pulmonary:      Effort: Pulmonary effort is normal. No respiratory distress.      Breath sounds: Normal breath sounds. No wheezing or rales.   Abdominal:      General: There is no distension.      Palpations: Abdomen is soft. There is no  mass.      Tenderness: There is no abdominal tenderness. There is no guarding.   Musculoskeletal:      Right lower leg: No edema.      Left lower leg: No edema.   Lymphadenopathy:      Cervical: No cervical adenopathy.   Skin:     Capillary Refill: Capillary refill takes less than 2 seconds.      Findings: No rash.      Comments:  No ulcerations of fingertips, L neck clavicular scar well healing   Neurological:      General: No focal deficit present.      Mental Status: She is alert and oriented to person, place, and time.      Cranial Nerves: No cranial nerve deficit.      Coordination: Coordination normal.   Psychiatric:         Mood and Affect: Mood normal.         Behavior: Behavior normal.         Thought Content: Thought content normal.         Judgment: Judgment normal.       Assessment:       Problem List Items Addressed This Visit       B12 deficiency    Relevant Orders    Homocysteine, Serum    Methylmalonic Acid, Serum    Chronic pain syndrome    Class 3 severe obesity due to excess calories with serious comorbidity and body mass index (BMI) of 40.0 to 44.9 in adult    Depression, major, recurrent, moderate    ALESSIA (generalized anxiety disorder)    Gastroesophageal reflux disease    Relevant Medications    omeprazole (PRILOSEC) 40 MG capsule    Hemiparesis affecting left side as late effect of stroke    Lennox-Gastaut syndrome    Mild intermittent asthma without complication    Mixed hyperlipidemia    Non-seasonal allergic rhinitis    Relevant Medications    montelukast (SINGULAIR) 10 mg tablet    Overactive bladder    Primary hypertension    Relevant Medications    losartan (COZAAR) 50 MG tablet    metoprolol succinate (TOPROL-XL) 50 MG 24 hr tablet    Other Relevant Orders    Lipid Panel    Comprehensive Metabolic Panel    CBC Auto Differential    Raynaud's phenomenon    Relevant Orders    Ambulatory referral/consult to Rheumatology    S/P placement of VNS (vagus nerve stimulation) device    Spinal  stenosis of lumbar region     Other Visit Diagnoses       Well adult exam    -  Primary    Hypertension, unspecified type        Relevant Medications    losartan (COZAAR) 50 MG tablet    metoprolol succinate (TOPROL-XL) 50 MG 24 hr tablet            Plan:     Well adult exam    Hemiparesis affecting left side as late effect of stroke    Nonintractable Lennox-Gastaut syndrome without status epilepticus    Mild intermittent asthma without complication    Mixed hyperlipidemia    Non-seasonal allergic rhinitis, unspecified trigger  -     montelukast (SINGULAIR) 10 mg tablet; Take 1 tablet (10 mg total) by mouth every evening.  Dispense: 90 tablet; Refill: 3    Overactive bladder    Primary hypertension  -     Lipid Panel; Future; Expected date: 09/21/2022  -     Comprehensive Metabolic Panel; Future; Expected date: 09/21/2022  -     CBC Auto Differential; Future; Expected date: 09/21/2022    Raynaud's phenomenon without gangrene  -     Ambulatory referral/consult to Rheumatology; Future; Expected date: 09/28/2022    S/P placement of VNS (vagus nerve stimulation) device    Severe obesity (BMI >= 40)  -     Vitamin D; Future; Expected date: 09/21/2022    Spinal stenosis of lumbar region, unspecified whether neurogenic claudication present    ALESSIA (generalized anxiety disorder)    Depression, major, recurrent, moderate    B12 deficiency  -     Homocysteine, Serum; Future; Expected date: 09/21/2022  -     Methylmalonic Acid, Serum; Future; Expected date: 09/21/2022    Chronic pain syndrome    Gastroesophageal reflux disease, unspecified whether esophagitis present  -     omeprazole (PRILOSEC) 40 MG capsule; Take 1 capsule (40 mg total) by mouth every morning.  Dispense: 90 capsule; Refill: 3    Hypertension, unspecified type  -     losartan (COZAAR) 50 MG tablet; Take 1 tablet (50 mg total) by mouth every evening.  Dispense: 90 tablet; Refill: 3  -     metoprolol succinate (TOPROL-XL) 50 MG 24 hr tablet; Take 1 tablet (50 mg  total) by mouth once daily.  Dispense: 90 tablet; Refill: 3    Other orders  -     Influenza - Quadrivalent (PF)      Maintain meds and set up subspecialty care. D&E and weight loss. Follow up in 6mo with labs to re assess chronic medical conditions. Flu and COVID vaccines discussed.    Scribe Attestation:   I, Dick Edwards, am scribing for, and in the presence of, Dr. Bean Baum Jr. I performed the above scribed service and the documentation accurately describes the services I performed. I attest to the accuracy of the note.    I, Dr. Bean Baum Jr, reviewed documentation as scribed above. I personally performed the services described in this documentation.  I agree that the record reflects my personal performance and is accurate and complete. Bean Baum Jr., MD.  09/21/2022

## 2022-09-22 ENCOUNTER — PATIENT MESSAGE (OUTPATIENT)
Dept: NEUROSURGERY | Facility: CLINIC | Age: 42
End: 2022-09-22

## 2022-09-22 ENCOUNTER — CLINICAL SUPPORT (OUTPATIENT)
Dept: NEUROSURGERY | Facility: CLINIC | Age: 42
End: 2022-09-22
Payer: COMMERCIAL

## 2022-09-22 ENCOUNTER — PATIENT MESSAGE (OUTPATIENT)
Dept: SPINE | Facility: CLINIC | Age: 42
End: 2022-09-22
Payer: COMMERCIAL

## 2022-09-22 NOTE — TELEPHONE ENCOUNTER
Patient said she has moved to [facility] and wants to know if you can place a referral to a back and spine doctor in that area.   Please advise.

## 2022-09-22 NOTE — PROGRESS NOTES
Patient seen via video visit  for 2 week post op s/p VNS revision with Dr Corona 09/15/2022 patient states the pain is starting to subside a little.          Incisions on anterior neck and left chest assessed, dermabond used for clsoure, no redness, swelling, or drainage, edges well approximated.     Patient was instructed as follows:   Discontinue Bacitracin after tonight.  May shower normally but pat dry after shower.  Do not submerge wound in bath tub or go swimming until released by the physician  Keep incision clean, dry and open to air as much as possible.     Advised to the patient after she is done with the pain medication, she can take tylenol for the pain.       All questions were answered. Patient will follow up with Didi Wise PA-C 10/25 for a virtual visit . Patient was encouraged to call clinic with any future concerns prior to follow up appt. If any worsening symptoms, patient should report to ED.       Tara Howe RN, BSN  Neurosurgery Nurse Navigator

## 2022-09-23 ENCOUNTER — TELEPHONE (OUTPATIENT)
Dept: INTERNAL MEDICINE | Facility: CLINIC | Age: 42
End: 2022-09-23
Payer: COMMERCIAL

## 2022-09-23 NOTE — TELEPHONE ENCOUNTER
----- Message from Spring Cerna sent at 9/23/2022 12:28 PM CDT -----  Contact: Summer  Summer would like call back at [phone] in regards to figuring out what referrals were sent out for her and also needs to know if all her medication can be refilled as well.  Thanks

## 2022-09-26 NOTE — HOSPITAL COURSE
Patient was admitted for pain from VNS wires on 9/14/22 and underwent VNS revision and battery re tethering on 9/14/22 without perioperative complications. The patient remained on abx until all drains were discontinued and was kept on appropriate DVT prophylaxis during the course of admission. At the time of discharge, the patient was tolerating PO intake without N/V, dysphagia, denied bowel or bladder dysfunction, denied new neurological symptoms, and reported pain controlled with current regimen. The surgical site was without evidence of drainage, breakdown or infection and all staples/sutures were intact. Therapy recommendations of home have been arranged and the patient will follow up in clinic as indicated in discharge instructions. All questions were answered and continued treatment/wound care instructions were discussed in detail prior to discharge.

## 2022-09-26 NOTE — ASSESSMENT & PLAN NOTE
Patient tolerated VNS revision well and was discharged home on POD 0 with 2 week follow up appointment for wound check.

## 2022-09-26 NOTE — DISCHARGE SUMMARY
[facility] - Surgery ([facility])  Neurosurgery  Discharge Summary      Patient Name: Annie Baird  MRN: [id number]  Admission Date: 9/14/2022  Hospital Length of Stay: 0 days  Discharge Date and Time:  9/14/22  Attending Physician: No att. providers found   Discharging Provider: Summer Baldwin MD  Primary Care Provider: Andre Kirk MD    HPI:   No notes on file    Procedure(s) (LRB):  REPLACEMENT, BATTERY, NEUROSTIMULATOR, VAGAL- revision (Left)     Hospital Course: Patient was admitted for pain from VNS wires on 9/14/22 and underwent VNS revision and battery re tethering on 9/14/22 without perioperative complications. The patient remained on abx until all drains were discontinued and was kept on appropriate DVT prophylaxis during the course of admission. At the time of discharge, the patient was tolerating PO intake without N/V, dysphagia, denied bowel or bladder dysfunction, denied new neurological symptoms, and reported pain controlled with current regimen. The surgical site was without evidence of drainage, breakdown or infection and all staples/sutures were intact. Therapy recommendations of home have been arranged and the patient will follow up in clinic as indicated in discharge instructions. All questions were answered and continued treatment/wound care instructions were discussed in detail prior to discharge.      Goals of Care Treatment Preferences:         Consults:       Pending Diagnostic Studies:     None        Final Active Diagnoses:    Diagnosis Date Noted POA    PRINCIPAL PROBLEM:  S/P placement of VNS (vagus nerve stimulation) device [Z96.89] 12/13/2021 Not Applicable      Problems Resolved During this Admission:      Discharged Condition: stable     Disposition: Home or Self Care    Follow Up:    Patient Instructions:      Diet Adult Regular     Notify your health care provider if you experience any of the following:     Notify your health care provider if you experience any  of the following:  increased confusion or weakness     Notify your health care provider if you experience any of the following:  persistent dizziness, light-headedness, or visual disturbances     Notify your health care provider if you experience any of the following:  worsening rash     Notify your health care provider if you experience any of the following:  severe persistent headache     Notify your health care provider if you experience any of the following:  difficulty breathing or increased cough     Notify your health care provider if you experience any of the following:  redness, tenderness, or signs of infection (pain, swelling, redness, odor or green/yellow discharge around incision site)     Notify your health care provider if you experience any of the following:  severe uncontrolled pain     Notify your health care provider if you experience any of the following:  persistent nausea and vomiting or diarrhea     Notify your health care provider if you experience any of the following:  temperature >100.4     Activity as tolerated     Medications:  Reconciled Home Medications:      Medication List      CONTINUE taking these medications    acetaminophen 325 MG tablet  Commonly known as: TYLENOL  Take 325 mg by mouth every 6 (six) hours as needed for Pain.     cyclobenzaprine 10 MG tablet  Commonly known as: FLEXERIL  as needed     EPIDIOLEX 100 mg/mL  Generic drug: cannabidioL  GIVE 8.2 ML BY MOUTH 2 TIMES A DAY.     gabapentin 600 MG tablet  Commonly known as: NEURONTIN  Take 1 tablet (600 mg total) by mouth 3 (three) times daily.     * lamoTRIgine 150 MG Tab  Commonly known as: LAMICTAL  Take 2 tablets (300 mg total) by mouth 2 (two) times daily. Take with 25 mg tablets for dose of 325 mg BID     * lamoTRIgine 25 MG tablet  Commonly known as: LAMICTAL  Take 1 tablet (25 mg total) by mouth 2 (two) times daily.     levETIRAcetam 750 MG Tab  Commonly known as: KEPPRA  Take 2 tablets (1,500 mg total) by mouth 2  (two) times daily.     LIDOcaine 5 %  Commonly known as: LIDODERM  Place 1 patch onto the skin every 24 hours. Remove & Discard patch within 12 hours or as directed by MD     LORazepam 0.5 MG tablet  Commonly known as: ATIVAN  Take 0.5 mg by mouth every 6 (six) hours as needed for Anxiety.     multivitamin with minerals tablet  Take 1 tablet by mouth once daily.     nortriptyline 25 MG capsule  Commonly known as: PAMELOR  Take 25 mg by mouth every evening.     ondansetron 4 MG tablet  Commonly known as: ZOFRAN  Take 8 mg by mouth every 8 (eight) hours as needed.     potassium gluconate 595 mg (99 mg) Tab  Take 1 tablet by mouth once daily.     TENS UNITS MISC  by Misc.(Non-Drug; Combo Route) route.     tiZANidine 4 MG tablet  Commonly known as: ZANAFLEX  Take 4 mg by mouth 2 (two) times daily.     vitamin D 1000 units Tab  Commonly known as: VITAMIN D3  Take 1,000 Units by mouth once daily.         * This list has 2 medication(s) that are the same as other medications prescribed for you. Read the directions carefully, and ask your doctor or other care provider to review them with you.            STOP taking these medications    hydrOXYzine HCL 25 MG tablet  Commonly known as: ATARAX     MOBIC 15 MG tablet  Generic drug: meloxicam        ASK your doctor about these medications    cephALEXin 500 MG capsule  Commonly known as: KEFLEX  Take 1 capsule (500 mg total) by mouth every 8 (eight) hours for 5 days  Ask about: Should I take this medication?            Summer Baldwin MD  Neurosurgery  [facility] - Surgery (2nd Fl)

## 2022-09-27 ENCOUNTER — PATIENT MESSAGE (OUTPATIENT)
Dept: PAIN MEDICINE | Facility: CLINIC | Age: 42
End: 2022-09-27
Payer: COMMERCIAL

## 2022-09-27 ENCOUNTER — PATIENT MESSAGE (OUTPATIENT)
Dept: NEUROSURGERY | Facility: CLINIC | Age: 42
End: 2022-09-27
Payer: COMMERCIAL

## 2022-09-27 ENCOUNTER — TELEPHONE (OUTPATIENT)
Dept: PAIN MEDICINE | Facility: CLINIC | Age: 42
End: 2022-09-27
Payer: COMMERCIAL

## 2022-09-27 ENCOUNTER — PATIENT MESSAGE (OUTPATIENT)
Dept: INTERNAL MEDICINE | Facility: CLINIC | Age: 42
End: 2022-09-27
Payer: COMMERCIAL

## 2022-09-27 NOTE — TELEPHONE ENCOUNTER
----- Message from Yolie Lebron MD sent at 9/27/2022  4:28 PM CDT -----  Yes, okay for her to see Cathi or myself. Thank you.

## 2022-09-29 ENCOUNTER — TELEPHONE (OUTPATIENT)
Dept: NEUROLOGY | Facility: CLINIC | Age: 42
End: 2022-09-29
Payer: COMMERCIAL

## 2022-09-29 NOTE — TELEPHONE ENCOUNTER
----- Message from Christiano Lorenzo sent at 9/29/2022  2:23 PM CDT -----  Type: Needs Medical Advice  Who Called:  Robyn Agarwal/ Jimena Luz    Best Call Back Number: [phone] Option 1-Option 4  Additional Information: Caller states that she would like a callback regarding the status of a fax that was sent  last week and this morning.    Ref# [id number]

## 2022-09-29 NOTE — TELEPHONE ENCOUNTER
Returned call to Jimena Luz and spoke with Jessa. Advised fax was sent to Dr. Corona. Patient is no longer following with Dr. Hasa and patient recently had surgery with Dr. Corona.

## 2022-10-04 ENCOUNTER — PATIENT MESSAGE (OUTPATIENT)
Dept: SPINE | Facility: CLINIC | Age: 42
End: 2022-10-04
Payer: COMMERCIAL

## 2022-10-13 ENCOUNTER — OFFICE VISIT (OUTPATIENT)
Dept: PAIN MEDICINE | Facility: CLINIC | Age: 42
End: 2022-10-13
Payer: COMMERCIAL

## 2022-10-13 VITALS
DIASTOLIC BLOOD PRESSURE: 94 MMHG | HEIGHT: 63 IN | BODY MASS INDEX: 42.17 KG/M2 | SYSTOLIC BLOOD PRESSURE: 142 MMHG | WEIGHT: 238 LBS | HEART RATE: 80 BPM

## 2022-10-13 DIAGNOSIS — M54.50 CHRONIC MIDLINE LOW BACK PAIN, UNSPECIFIED WHETHER SCIATICA PRESENT: ICD-10-CM

## 2022-10-13 DIAGNOSIS — G89.4 CHRONIC PAIN DISORDER: Primary | ICD-10-CM

## 2022-10-13 DIAGNOSIS — M54.16 LUMBAR RADICULOPATHY: Primary | ICD-10-CM

## 2022-10-13 DIAGNOSIS — F11.90 CHRONIC, CONTINUOUS USE OF OPIOIDS: Primary | ICD-10-CM

## 2022-10-13 DIAGNOSIS — G89.29 CHRONIC MIDLINE LOW BACK PAIN, UNSPECIFIED WHETHER SCIATICA PRESENT: ICD-10-CM

## 2022-10-13 PROCEDURE — 80326 AMPHETAMINES 5 OR MORE: CPT | Performed by: PHYSICIAN ASSISTANT

## 2022-10-13 PROCEDURE — 99999 PR PBB SHADOW E&M-EST. PATIENT-LVL IV: ICD-10-PCS | Mod: PBBFAC,,, | Performed by: PHYSICIAN ASSISTANT

## 2022-10-13 PROCEDURE — 99214 OFFICE O/P EST MOD 30 MIN: CPT | Mod: S$GLB,,, | Performed by: PHYSICIAN ASSISTANT

## 2022-10-13 PROCEDURE — 3077F SYST BP >= 140 MM HG: CPT | Mod: CPTII,S$GLB,, | Performed by: PHYSICIAN ASSISTANT

## 2022-10-13 PROCEDURE — 4010F ACE/ARB THERAPY RXD/TAKEN: CPT | Mod: CPTII,S$GLB,, | Performed by: PHYSICIAN ASSISTANT

## 2022-10-13 PROCEDURE — 3077F PR MOST RECENT SYSTOLIC BLOOD PRESSURE >= 140 MM HG: ICD-10-PCS | Mod: CPTII,S$GLB,, | Performed by: PHYSICIAN ASSISTANT

## 2022-10-13 PROCEDURE — 3080F DIAST BP >= 90 MM HG: CPT | Mod: CPTII,S$GLB,, | Performed by: PHYSICIAN ASSISTANT

## 2022-10-13 PROCEDURE — 3080F PR MOST RECENT DIASTOLIC BLOOD PRESSURE >= 90 MM HG: ICD-10-PCS | Mod: CPTII,S$GLB,, | Performed by: PHYSICIAN ASSISTANT

## 2022-10-13 PROCEDURE — 4010F PR ACE/ARB THEARPY RXD/TAKEN: ICD-10-PCS | Mod: CPTII,S$GLB,, | Performed by: PHYSICIAN ASSISTANT

## 2022-10-13 PROCEDURE — 1159F MED LIST DOCD IN RCRD: CPT | Mod: CPTII,S$GLB,, | Performed by: PHYSICIAN ASSISTANT

## 2022-10-13 PROCEDURE — 1160F RVW MEDS BY RX/DR IN RCRD: CPT | Mod: CPTII,S$GLB,, | Performed by: PHYSICIAN ASSISTANT

## 2022-10-13 PROCEDURE — 99999 PR PBB SHADOW E&M-EST. PATIENT-LVL IV: CPT | Mod: PBBFAC,,, | Performed by: PHYSICIAN ASSISTANT

## 2022-10-13 PROCEDURE — 1160F PR REVIEW ALL MEDS BY PRESCRIBER/CLIN PHARMACIST DOCUMENTED: ICD-10-PCS | Mod: CPTII,S$GLB,, | Performed by: PHYSICIAN ASSISTANT

## 2022-10-13 PROCEDURE — 99214 PR OFFICE/OUTPT VISIT, EST, LEVL IV, 30-39 MIN: ICD-10-PCS | Mod: S$GLB,,, | Performed by: PHYSICIAN ASSISTANT

## 2022-10-13 PROCEDURE — 1159F PR MEDICATION LIST DOCUMENTED IN MEDICAL RECORD: ICD-10-PCS | Mod: CPTII,S$GLB,, | Performed by: PHYSICIAN ASSISTANT

## 2022-10-13 RX ORDER — HYDROCODONE BITARTRATE AND ACETAMINOPHEN 7.5; 325 MG/1; MG/1
1 TABLET ORAL EVERY 6 HOURS PRN
Qty: 120 TABLET | Refills: 0 | Status: SHIPPED | OUTPATIENT
Start: 2022-10-27 | End: 2022-10-25

## 2022-10-13 RX ORDER — GABAPENTIN 600 MG/1
600 TABLET ORAL 3 TIMES DAILY
Qty: 90 TABLET | Refills: 0 | Status: SHIPPED | OUTPATIENT
Start: 2022-10-13 | End: 2022-12-01 | Stop reason: SDUPTHER

## 2022-10-13 NOTE — PROGRESS NOTES
FOLLOW UP NOTE:     CHIEF COMPLAINT: neck, back, leg, and arm pain    INITIAL HISTORY OF PRESENT ILLNESS: Annie Baird is a 41 y.o. female with PMH significant for seizure disorder s/p vagal nerve stimulator placement, HTN, hx of anxiety on chronic benzodiazepines, hx of left shoulder arthroscopic surgery (12/4/2012), hx of right CTS release (11/22/2013), hx of multiple CVA's (residual left sided weakness per patient and daughter report), narcolepsy, insomnia, and hx of C5-C6 ACDF (5/1/2019) presents as a referral for the evaluation of multiple pain complaints. The patient reports that her low back pain is the worst of her pains. The patient has had a rather extensive work-up in [place], and I have put the relevant diagnostic tests in my note below. In summary:     The patient reports that her pain began approximately 1 year ago when she experienced low back pain. The patient denies of any inciting incident or trauma. The patient reports of worsening pain over time. She reported of progressive symptoms that extended into her lower extremities. She eventually presented to the hospital for evaluation of possible cauda equina syndrome. CT myelogram results copied below. In regards to her pain: the patient localizes her pain to center of her lower back. The patient reports of radiation to her bilateral buttocks and down the posterior aspect of her RLE to her foot. The patient describes her pain as a sharp, stinging, and burning type of pain. The patient reports of numbness in her left 2nd, 3rd, and 4th digits in her hand and her 1st and 2nd toes bilaterally. The patient reports that her pain is a 6/10. Patient denies of any fecal incontinence or saddle anesthesia. The patient reports of daily episodes of urinary incontinence which has been ongoing for the past few months. The patient reports of requiring the assistance of a wheelchair for ambulation for the past few months.      Aggravating factors: activity  in general     Mitigating factors: laying on her left side     Relevant Surgeries: yes; history of cervical spine surgery X 1     Interventional Therapies: yes; Dr. Chun Pat in [place]. Dr. Pat did RFA's and epidurals. No benefit.     INTERVAL HISTORY OF PRESENT ILLNESS: Annie Baird is a 42 y.o. female with PMH significant for seizure disorder s/p vagal nerve stimulator placement, HTN, hx of anxiety on chronic benzodiazepines, hx of left shoulder arthroscopic surgery (12/4/2012), hx of right CTS release (11/22/2013), hx of multiple CVA's (residual left sided weakness per patient and daughter report), narcolepsy, insomnia, and hx of C5-C6 ACDF (5/1/2019) presents as an established patient for the continued management of chronic pain in multiple areas. For the last 4 months she was managed by pain management in [place]. She is relocating back to [place]. Hydrocodone was decrease to q 8h. Reports for the last 3 months she has not been able to get out of bed due to worsening pain. CC Today is worsening pain down right LE, posterior thigh to her right foot. She has benefited in the past from IESI at L5-S1 and requests a repeat. Today, the patient complains of pain in multiple locations which including her low back, mid back near her shoulder blades, neck, feet, and hands. The patient reports that activity in general worsens her pain. The patient is tolerating her current pain regimen without adverse side effects. The patient denies of any significant changes in her health since her last appointment. The patient also denies of any changes in the character of her pain since her last appointment. The patient reports that her current pain is a 6/10. Divorce was finalized in April. Patient denies of any urinary/fecal incontinence, saddle anesthesia, or weakness.      INTERVENTIONAL PAIN HISTORY:  7/12/2021:  Lumbar interlaminar epidural steroid injection at L5-S1 under fluoroscopic guidance (left paramedian  "approach) - improvement for a few days    CURRENT PAIN MEDICATIONS:   gabapentin 600/600/900 mg PO TID  Norco 7.5- 325 mg PO q 6 hr  zofran 4 mg PO q 8 hr     Previously taking:   Ativan (once or twice a month)  temazepam 30 mg PO QHS (insomnia)        ROS:  Review of Systems   Constitutional:  Negative for chills and fever.   HENT:  Negative for sore throat.    Eyes:  Negative for visual disturbance.   Respiratory:  Negative for shortness of breath.    Cardiovascular:  Negative for chest pain.   Gastrointestinal:  Negative for nausea and vomiting.   Genitourinary:  Negative for difficulty urinating.   Musculoskeletal:  Positive for arthralgias, back pain, gait problem, myalgias and neck pain.   Skin:  Negative for rash.   Allergic/Immunologic: Negative for immunocompromised state.   Neurological:  Positive for seizures, weakness and numbness. Negative for syncope.   Hematological:  Does not bruise/bleed easily.   Psychiatric/Behavioral:  Negative for suicidal ideas.       MEDICAL, SURGICAL, FAMILY, SOCIAL HX: reviewed    MEDICATIONS/ALLERGIES: reviewed    PHYSICAL EXAM:    VITALS: Vitals reviewed.   Vitals:    10/13/22 1250   BP: (!) 142/94   Pulse: 80   Weight: 108 kg (238 lb)   Height: 5' 3" (1.6 m)   PainSc:   6   PainLoc: Back       Physical Exam  Vitals and nursing note reviewed.   Constitutional:       Appearance: She is not diaphoretic.   HENT:      Head: Normocephalic and atraumatic.   Eyes:      General:         Right eye: No discharge.         Left eye: No discharge.      Conjunctiva/sclera: Conjunctivae normal.   Cardiovascular:      Rate and Rhythm: Normal rate.   Pulmonary:      Effort: Pulmonary effort is normal. No respiratory distress.      Breath sounds: Normal breath sounds.   Abdominal:      Palpations: Abdomen is soft.   Skin:     General: Skin is warm and dry.      Findings: No rash.   Neurological:      Mental Status: She is alert and oriented to person, place, and time.   Psychiatric:         " Mood and Affect: Mood and affect normal.         Cognition and Memory: Memory normal.         Judgment: Judgment normal.      UPPER EXTREMITIES: Normal alignment, normal range of motion, no atrophy, no skin changes,  hair growth and nail growth normal and equal bilaterally. No swelling, no tenderness.    LOWER EXTREMITIES:  Normal alignment, normal range of motion, no atrophy, no skin changes,  hair growth and nail growth normal and equal bilaterally. No swelling, no tenderness.     CERVICAL SPINE:  Cervical spine: ROM is full in flexion, extension and lateral rotation with increased pain with passive ROM  Spurling's maneuver - deferred given prior fusion  Myofascial exam: Tenderness to palpation across cervical paraspinous region bilaterally.     LUMBAR SPINE  Lumbar spine: ROM is full with flexion but limited with extension and oblique extension with increased pain with extension   ((+)) Supine straight leg raise on the right side    ((+)) Facet loading   Internal and external rotation of the hip causes no increased pain on either side.  Myofascial exam: Tenderness to palpation across lumbar paraspinous muscles.     ((+)) TTP at the SI joint on left  ((+)) ELEAZAR's test  ((+)) Distraction test  ((--)) Thigh thrust   TTP left GTB   MOTOR: Tone and bulk: normal bilateral upper and lower Strength: normal   Delt      Bi         Tri        WE      WF                        R          5          5          5          5          5          5            L          5          5          5          5          5          4               IP         ADD     ABD     Quad   TA        Gas      HAM  R          5          5          5          5          5          5          5  L          5          5          5          5          5          5          5     SENSATION: Decreased sensation in the entire left hand (non-dermatomal distribution)  REFLEXES: normal, symmetric, nonbrisk.  Toes down, no clonus. Negative mendiola's sign  bilaterally.  GAIT: antalgic gait; unsteady gait    IMAGING:    X-ray cervical spine (8/3/2021):  There is no detectable change in the appearance of the cervical spine compared to the prior study.  There are postsurgical changes of ACDF at the C5-6 level.  Hardware is intact.  Interbody fusion device is unchanged in appearance.  There is mild endplate osteophyte formation at the C4-5 level.  Vertebral body height and alignment are normal.  There is no fracture.  The odontoid process is intact.  The patient has a vagal stimulator.  Otherwise, the soft tissues are normal.     X-ray thoracic spine (7/23/2021):  The thoracic vertebral bodies maintain normal height and alignment without significant disc space narrowing. The posterior elements are intact. There is mild osteophyte formation at the T11-12 and T12-L1 levels. The paraspinous soft tissues are normal.    ASSESSMENT: Annie Baird is a 41 y.o. female with PMH significant for seizure disorder s/p vagal nerve stimulator placement, HTN, hx of anxiety on chronic benzodiazepines, hx of left shoulder arthroscopic surgery (12/4/2012), hx of right CTS release (11/22/2013), hx of multiple CVA's (residual left sided weakness per patient and daughter report), narcolepsy, insomnia, and hx of C5-C6 ACDF (5/1/2019) presents as an established patient for the continued management of chronic pain in multiple areas. Treatment plan outlined below.     PLAN:  1. Prescribed Norco 7.5-325 mg PO q 6 hr PRN for breakthrough pain; # 120 tablets; 0 refills.  reviewed without discrepancies. Previous UDS consistent   2. Continue gabapentin as prescribed for neuropathic pain. Discussed may attribute to LE swelling.   3. Previous UDS tests positive for marijuana secondary to epidiolex which she takes for her seizure disorder. UDS today  4. Schedule PATRICIA at L5-S1. I have explained the risks, benefits, and alternatives of the procedure in detail. The patient voices understanding and  all questions have been answered. The patient agrees to proceed as planned. Written Consent obtained.   5.  F/u 1 month  All medication management performed by Dr. Lebron

## 2022-10-14 ENCOUNTER — TELEPHONE (OUTPATIENT)
Dept: NEUROSURGERY | Facility: CLINIC | Age: 42
End: 2022-10-14
Payer: COMMERCIAL

## 2022-10-14 NOTE — TELEPHONE ENCOUNTER
Spoke to staff at [facility]. I explained that Dr. Corona has been out of town this week, and the paperwork they want him to fill out is currently sitting on desk. I said that as soon as he fills out that paperwork, I will fax it back. Message was sent to Paula.      ----- Message from Tona Saunders MA sent at 10/14/2022 10:40 AM CDT -----  Regarding: VNS Device  Contact: Paula Mitchell with  [facility] is requesting a call back in regards to VNS Device for the pt. Please call Paula to assist her with this matter.              Confirmed Contact below:  Contact Name:  Paula  Phone Number: [phone] option 1, option 4

## 2022-10-19 LAB
6MAM UR QL: NOT DETECTED
7AMINOCLONAZEPAM UR QL: NOT DETECTED
A-OH ALPRAZ UR QL: NOT DETECTED
ALPHA-OH-MIDAZOLAM: NOT DETECTED
ALPRAZ UR QL: NOT DETECTED
AMPHET UR QL SCN: NOT DETECTED
ANNOTATION COMMENT IMP: NORMAL
ANNOTATION COMMENT IMP: NORMAL
BARBITURATES UR QL: NOT DETECTED
BUPRENORPHINE UR QL: NOT DETECTED
BZE UR QL: NOT DETECTED
CARBOXYTHC UR QL: NOT DETECTED
CARISOPRODOL UR QL: NOT DETECTED
CLONAZEPAM UR QL: NOT DETECTED
CODEINE UR QL: NOT DETECTED
CREAT UR-MCNC: 210.3 MG/DL (ref 20–400)
DIAZEPAM UR QL: NOT DETECTED
ETHYL GLUCURONIDE UR QL: NOT DETECTED
FENTANYL UR QL: NOT DETECTED
GABAPENTIN: NOT DETECTED
HYDROCODONE UR QL: PRESENT
HYDROMORPHONE UR QL: PRESENT
LORAZEPAM UR QL: NOT DETECTED
MDA UR QL: NOT DETECTED
MDEA UR QL: NOT DETECTED
MDMA UR QL: NOT DETECTED
ME-PHENIDATE UR QL: NOT DETECTED
METHADONE UR QL: NOT DETECTED
METHAMPHET UR QL: NOT DETECTED
MIDAZOLAM UR QL SCN: NOT DETECTED
MORPHINE UR QL: NOT DETECTED
NALOXONE: NOT DETECTED
NORBUPRENORPHINE UR QL CFM: NOT DETECTED
NORDIAZEPAM UR QL: NOT DETECTED
NORFENTANYL UR QL: NOT DETECTED
NORHYDROCODONE UR QL CFM: NOT DETECTED
NORMEPERIDINE UR QL CFM: NOT DETECTED
NOROXYCODONE UR QL CFM: PRESENT
NOROXYMORPHONE UR QL SCN: PRESENT
OXAZEPAM UR QL: NOT DETECTED
OXYCODONE UR QL: PRESENT
OXYMORPHONE UR QL: PRESENT
PATHOLOGY STUDY: NORMAL
PCP UR QL: NOT DETECTED
PHENTERMINE UR QL: NOT DETECTED
PREGABALIN: NOT DETECTED
SERVICE CMNT-IMP: NORMAL
TAPENTADOL UR QL SCN: NOT DETECTED
TAPENTADOL UR QL SCN: NOT DETECTED
TEMAZEPAM UR QL: NOT DETECTED
TRAMADOL UR QL: NOT DETECTED
ZOLPIDEM METABOLITE: NOT DETECTED
ZOLPIDEM UR QL: NOT DETECTED

## 2022-10-20 ENCOUNTER — TELEPHONE (OUTPATIENT)
Dept: NEUROLOGY | Facility: CLINIC | Age: 42
End: 2022-10-20
Payer: COMMERCIAL

## 2022-10-20 NOTE — TELEPHONE ENCOUNTER
----- Message from Phoebe Loredo sent at 10/20/2022  2:56 PM CDT -----  Contact: Precious with [facility]  Type:  Pharmacy Calling to Clarify an RX    Name of Caller:  Precious  Pharmacy Name:  [facility]  Prescription Name:  Epidiolex 100 mg per ml  What do they need to clarify?:    Best Call Back Number:  [phone]  Additional Information:  Precious states the last time patient had medication was 07/18/22. They have not been able to reach the patient and needs to know if she is still needing the medication. Please call Precious to advise.Thanks!

## 2022-10-20 NOTE — TELEPHONE ENCOUNTER
Returned call to [facility]. Unable to get through automated system. Patient transferred care from Dr. Haas

## 2022-10-24 ENCOUNTER — PATIENT MESSAGE (OUTPATIENT)
Dept: PAIN MEDICINE | Facility: CLINIC | Age: 42
End: 2022-10-24
Payer: COMMERCIAL

## 2022-10-24 DIAGNOSIS — M51.36 DDD (DEGENERATIVE DISC DISEASE), LUMBAR: Primary | ICD-10-CM

## 2022-10-24 DIAGNOSIS — M54.16 LUMBAR RADICULOPATHY: ICD-10-CM

## 2022-10-24 DIAGNOSIS — G89.4 CHRONIC PAIN DISORDER: ICD-10-CM

## 2022-10-24 DIAGNOSIS — M53.3 SACROILIAC JOINT PAIN: ICD-10-CM

## 2022-10-24 NOTE — TELEPHONE ENCOUNTER
Dr Lebron,   Pt has a pending rx to the [facility]  I changed her pharmacy, can you please change the rx? Thank you.

## 2022-10-25 ENCOUNTER — TELEPHONE (OUTPATIENT)
Dept: NEUROSURGERY | Facility: CLINIC | Age: 42
End: 2022-10-25
Payer: COMMERCIAL

## 2022-10-25 RX ORDER — HYDROCODONE BITARTRATE AND ACETAMINOPHEN 7.5; 325 MG/1; MG/1
1 TABLET ORAL EVERY 6 HOURS PRN
Qty: 120 TABLET | Refills: 0 | Status: SHIPPED | OUTPATIENT
Start: 2022-10-25 | End: 2022-11-16 | Stop reason: SDUPTHER

## 2022-10-25 NOTE — TELEPHONE ENCOUNTER
Pt was contacted regarding her scheduled 4pm virtual appt with Heather Wise PA-C . The pt stated that she forgot about her appt and due to family concerns/issues she would not be able to do the virtual. She did express concern about possible wound opening located on her neck. I was not sure if the pt meant her neck or not but I did ask the pt to send a picture through the pt portal today. The pt VU and agreed to send a picture. She also stated that she would call back on tomorrow 10/26/22 for scheduling.  JAMA CHEUNG

## 2022-10-27 ENCOUNTER — PATIENT MESSAGE (OUTPATIENT)
Dept: NEUROSURGERY | Facility: CLINIC | Age: 42
End: 2022-10-27
Payer: COMMERCIAL

## 2022-11-10 DIAGNOSIS — M51.36 DDD (DEGENERATIVE DISC DISEASE), LUMBAR: ICD-10-CM

## 2022-11-10 DIAGNOSIS — M54.16 LUMBAR RADICULOPATHY: ICD-10-CM

## 2022-11-10 DIAGNOSIS — M53.3 SACROILIAC JOINT PAIN: ICD-10-CM

## 2022-11-10 DIAGNOSIS — G89.4 CHRONIC PAIN DISORDER: ICD-10-CM

## 2022-11-16 ENCOUNTER — OFFICE VISIT (OUTPATIENT)
Dept: PAIN MEDICINE | Facility: CLINIC | Age: 42
End: 2022-11-16
Payer: COMMERCIAL

## 2022-11-16 DIAGNOSIS — M53.3 SACROILIAC JOINT PAIN: ICD-10-CM

## 2022-11-16 DIAGNOSIS — M51.36 DDD (DEGENERATIVE DISC DISEASE), LUMBAR: ICD-10-CM

## 2022-11-16 DIAGNOSIS — M25.512 ACUTE PAIN OF LEFT SHOULDER: ICD-10-CM

## 2022-11-16 DIAGNOSIS — M54.16 LUMBAR RADICULOPATHY: Primary | ICD-10-CM

## 2022-11-16 DIAGNOSIS — G89.4 CHRONIC PAIN DISORDER: ICD-10-CM

## 2022-11-16 DIAGNOSIS — M54.16 LUMBAR RADICULOPATHY: ICD-10-CM

## 2022-11-16 DIAGNOSIS — F11.90 CHRONIC, CONTINUOUS USE OF OPIOIDS: ICD-10-CM

## 2022-11-16 PROCEDURE — 99214 PR OFFICE/OUTPT VISIT, EST, LEVL IV, 30-39 MIN: ICD-10-PCS | Mod: 95,,, | Performed by: PHYSICIAN ASSISTANT

## 2022-11-16 PROCEDURE — 4010F ACE/ARB THERAPY RXD/TAKEN: CPT | Mod: CPTII,95,, | Performed by: PHYSICIAN ASSISTANT

## 2022-11-16 PROCEDURE — 99214 OFFICE O/P EST MOD 30 MIN: CPT | Mod: 95,,, | Performed by: PHYSICIAN ASSISTANT

## 2022-11-16 PROCEDURE — 4010F PR ACE/ARB THEARPY RXD/TAKEN: ICD-10-PCS | Mod: CPTII,95,, | Performed by: PHYSICIAN ASSISTANT

## 2022-11-16 RX ORDER — HYDROCODONE BITARTRATE AND ACETAMINOPHEN 7.5; 325 MG/1; MG/1
1 TABLET ORAL EVERY 6 HOURS PRN
Qty: 120 TABLET | Refills: 0 | Status: SHIPPED | OUTPATIENT
Start: 2022-11-24 | End: 2022-12-16 | Stop reason: SDUPTHER

## 2022-11-16 NOTE — TELEPHONE ENCOUNTER
----- Message from Cathi Rossi PA-C sent at 11/16/2022  9:41 AM CST -----  Please call patient to reschedule PATRICIA

## 2022-11-16 NOTE — PROGRESS NOTES
FOLLOW UP NOTE:     CHIEF COMPLAINT: neck, back, leg, and arm pain  Visit type: Virtual visit with synchronous audio and video  Total time spent with patient: 25 minutes  Each patient to whom he or she provides medical services by telemedicine is:  (1) informed of the relationship between the physician and patient and the respective role of any other health care provider with respect to management of the patient; and (2) notified that he or she may decline to receive medical services by telemedicine and may withdraw from such care at any time.  Patient is at home in [place].     INITIAL HISTORY OF PRESENT ILLNESS: Annie Baird is a 41 y.o. female with PMH significant for seizure disorder s/p vagal nerve stimulator placement, HTN, hx of anxiety on chronic benzodiazepines, hx of left shoulder arthroscopic surgery (12/4/2012), hx of right CTS release (11/22/2013), hx of multiple CVA's (residual left sided weakness per patient and daughter report), narcolepsy, insomnia, and hx of C5-C6 ACDF (5/1/2019) presents as a referral for the evaluation of multiple pain complaints. The patient reports that her low back pain is the worst of her pains. The patient has had a rather extensive work-up in [place], and I have put the relevant diagnostic tests in my note below. In summary:     The patient reports that her pain began approximately 1 year ago when she experienced low back pain. The patient denies of any inciting incident or trauma. The patient reports of worsening pain over time. She reported of progressive symptoms that extended into her lower extremities. She eventually presented to the hospital for evaluation of possible cauda equina syndrome. CT myelogram results copied below. In regards to her pain: the patient localizes her pain to center of her lower back. The patient reports of radiation to her bilateral buttocks and down the posterior aspect of her RLE to her foot. The patient describes her pain as  a sharp, stinging, and burning type of pain. The patient reports of numbness in her left 2nd, 3rd, and 4th digits in her hand and her 1st and 2nd toes bilaterally. The patient reports that her pain is a 6/10. Patient denies of any fecal incontinence or saddle anesthesia. The patient reports of daily episodes of urinary incontinence which has been ongoing for the past few months. The patient reports of requiring the assistance of a wheelchair for ambulation for the past few months.      Aggravating factors: activity in general     Mitigating factors: laying on her left side     Relevant Surgeries: yes; history of cervical spine surgery X 1     Interventional Therapies: yes; Dr. Chun Pat in [place]. Dr. Pat did RFA's and epidurals. No benefit.     INTERVAL HISTORY OF PRESENT ILLNESS: Annie Baird is a 42 y.o. female with PMH significant for seizure disorder s/p vagal nerve stimulator placement, HTN, hx of anxiety on chronic benzodiazepines, hx of left shoulder arthroscopic surgery (12/4/2012), hx of right CTS release (11/22/2013), hx of multiple CVA's (residual left sided weakness per patient and daughter report), narcolepsy, insomnia, and hx of C5-C6 ACDF (5/1/2019) presents as an established patient for the continued management of chronic pain in multiple areas. For the last 4 months she was managed by pain management in [place]. She is relocating back to [place]. Hydrocodone was decrease to q 8h. Reports for the last 3 months she has not been able to get out of bed due to worsening pain. CC Today is worsening pain down right LE, posterior thigh to her right foot. She has benefited in the past from IESI at L5-S1 and requests a repeat. Today, the patient complains of pain in multiple locations which including her low back, mid back near her shoulder blades, neck, feet, and hands. Since [facility] she has been packing to move and has injured her left shoulder. The patient reports that activity in general  worsens her pain. The patient is tolerating her current pain regimen without adverse side effects. The patient denies of any significant changes in her health since her last appointment. The patient also denies of any changes in the character of her pain since her last appointment. The patient reports that her current pain is a 6/10. Divorce was finalized in April. Patient denies of any urinary/fecal incontinence, saddle anesthesia, or weakness.      INTERVENTIONAL PAIN HISTORY:  7/12/2021:  Lumbar interlaminar epidural steroid injection at L5-S1 under fluoroscopic guidance (left paramedian approach) - improvement for a few days    CURRENT PAIN MEDICATIONS:   gabapentin 600/600/900 mg PO TID  Norco 7.5- 325 mg PO q 6 hr  zofran 4 mg PO q 8 hr     Previously taking:   Ativan (once or twice a month)  temazepam 30 mg PO QHS (insomnia)        ROS:  Review of Systems   Constitutional:  Negative for chills and fever.   HENT:  Negative for sore throat.    Eyes:  Negative for visual disturbance.   Respiratory:  Negative for shortness of breath.    Cardiovascular:  Negative for chest pain.   Gastrointestinal:  Negative for nausea and vomiting.   Genitourinary:  Negative for difficulty urinating.   Musculoskeletal:  Positive for arthralgias, back pain, gait problem, myalgias and neck pain.   Skin:  Negative for rash.   Allergic/Immunologic: Negative for immunocompromised state.   Neurological:  Positive for seizures, weakness and numbness. Negative for syncope.   Hematological:  Does not bruise/bleed easily.   Psychiatric/Behavioral:  Negative for suicidal ideas.       MEDICAL, SURGICAL, FAMILY, SOCIAL HX: reviewed    MEDICATIONS/ALLERGIES: reviewed    PHYSICAL EXAM:    GENERAL: A and O x3, the patient appears well groomed and is in no acute distress.  Skin: No rashes or obvious lesions  HEENT: normocephalic, atraumatic  LUNGS: non labored breathing  ABDOMEN: non distended  UPPER EXTREMITIES and lower extremities. Normal  ROM  CERVICAL SPINE:  Cervical spine: ROM is full in flexion, extension and lateral rotation without increased pain.    LUMBAR SPINE  Lumbar spine: ROM is limited with flexion extension and oblique extension with mild-to-moderate increased pain.      MENTAL STATUS: normal orientation, speech, language, and fund of knowledge for social situation.  Emotional state appropriate.    GAIT: normal rise, base, steps, and arm swing.      GAIT: antalgic gait; unsteady gait    IMAGING:    X-ray cervical spine (8/3/2021):  There is no detectable change in the appearance of the cervical spine compared to the prior study.  There are postsurgical changes of ACDF at the C5-6 level.  Hardware is intact.  Interbody fusion device is unchanged in appearance.  There is mild endplate osteophyte formation at the C4-5 level.  Vertebral body height and alignment are normal.  There is no fracture.  The odontoid process is intact.  The patient has a vagal stimulator.  Otherwise, the soft tissues are normal.     X-ray thoracic spine (7/23/2021):  The thoracic vertebral bodies maintain normal height and alignment without significant disc space narrowing. The posterior elements are intact. There is mild osteophyte formation at the T11-12 and T12-L1 levels. The paraspinous soft tissues are normal.    ASSESSMENT: Annie Baird is a 41 y.o. female with PMH significant for seizure disorder s/p vagal nerve stimulator placement, HTN, hx of anxiety on chronic benzodiazepines, hx of left shoulder arthroscopic surgery (12/4/2012), hx of right CTS release (11/22/2013), hx of multiple CVA's (residual left sided weakness per patient and daughter report), narcolepsy, insomnia, and hx of C5-C6 ACDF (5/1/2019) presents as an established patient for the continued management of chronic pain in multiple areas. Treatment plan outlined below.     PLAN:  1. Prescribed Norco 7.5-325 mg PO q 6 hr PRN for breakthrough pain; # 120 tablets; 0 refills.   reviewed without discrepancies. Previous UDS consistent   2. Continue gabapentin as prescribed for neuropathic pain. Discussed may attribute to LE swelling.   3. Previous UDS tests positive for marijuana secondary to epidiolex which she takes for her seizure disorder. Previous UDS consistent  4. Will call to reschedule PATRICIA at L5-S1.   5.  If shoulder pain continues on left, will order MRI.   6. F/u 1 month  All medication management performed by Dr. Lebron

## 2022-11-18 ENCOUNTER — TELEPHONE (OUTPATIENT)
Dept: PAIN MEDICINE | Facility: CLINIC | Age: 42
End: 2022-11-18
Payer: COMMERCIAL

## 2022-11-18 NOTE — TELEPHONE ENCOUNTER
Left message for return call to reschedule          Note      You 2 days ago     TC  Injection-steroid-epidural-lumbar L5-S1         Note      You 2 days ago     TC  ----- Message from Cathi Rossi PA-C sent at 11/16/2022  9:41 AM CST -----  Please call patient to reschedule PATRICIA

## 2022-11-23 ENCOUNTER — TELEPHONE (OUTPATIENT)
Dept: PAIN MEDICINE | Facility: CLINIC | Age: 42
End: 2022-11-23
Payer: COMMERCIAL

## 2022-11-23 NOTE — TELEPHONE ENCOUNTER
----- Message from Farrukh Borrero sent at 11/23/2022 10:31 AM CST -----  Contact: Marisol  Type:  Patient Call          Who Called: Kumar Damon         Does the patient know what this is regarding?: requesting a call back about the Rx HYDROcodone-acetaminophen (NORCO) 7.5-325 mg per tablet ; pt is also on Gabapentin ; pharmacy just would like to know if it's okay to fill ;please advise             Additional Information:   [facility] [place] [address] [address]  Phone: [phone] Fax: [phone]

## 2022-12-01 ENCOUNTER — PATIENT MESSAGE (OUTPATIENT)
Dept: FAMILY MEDICINE | Facility: CLINIC | Age: 42
End: 2022-12-01

## 2022-12-01 ENCOUNTER — OFFICE VISIT (OUTPATIENT)
Dept: FAMILY MEDICINE | Facility: CLINIC | Age: 42
End: 2022-12-01
Payer: COMMERCIAL

## 2022-12-01 DIAGNOSIS — E78.2 MIXED HYPERLIPIDEMIA: ICD-10-CM

## 2022-12-01 DIAGNOSIS — G40.909 SEIZURE DISORDER: ICD-10-CM

## 2022-12-01 DIAGNOSIS — E66.01 MORBID OBESITY: ICD-10-CM

## 2022-12-01 DIAGNOSIS — I10 HYPERTENSION, UNSPECIFIED TYPE: ICD-10-CM

## 2022-12-01 DIAGNOSIS — M54.50 CHRONIC MIDLINE LOW BACK PAIN, UNSPECIFIED WHETHER SCIATICA PRESENT: ICD-10-CM

## 2022-12-01 DIAGNOSIS — F17.200 NICOTINE DEPENDENCE, UNCOMPLICATED, UNSPECIFIED NICOTINE PRODUCT TYPE: ICD-10-CM

## 2022-12-01 DIAGNOSIS — M51.36 DEGENERATIVE DISC DISEASE, LUMBAR: Primary | ICD-10-CM

## 2022-12-01 DIAGNOSIS — K21.9 GASTROESOPHAGEAL REFLUX DISEASE, UNSPECIFIED WHETHER ESOPHAGITIS PRESENT: ICD-10-CM

## 2022-12-01 DIAGNOSIS — G89.29 CHRONIC MIDLINE LOW BACK PAIN, UNSPECIFIED WHETHER SCIATICA PRESENT: ICD-10-CM

## 2022-12-01 DIAGNOSIS — M25.512 CHRONIC LEFT SHOULDER PAIN: ICD-10-CM

## 2022-12-01 DIAGNOSIS — G89.29 CHRONIC LEFT SHOULDER PAIN: ICD-10-CM

## 2022-12-01 DIAGNOSIS — J30.89 NON-SEASONAL ALLERGIC RHINITIS, UNSPECIFIED TRIGGER: ICD-10-CM

## 2022-12-01 DIAGNOSIS — Z12.31 ENCOUNTER FOR SCREENING MAMMOGRAM FOR MALIGNANT NEOPLASM OF BREAST: ICD-10-CM

## 2022-12-01 PROCEDURE — 4010F ACE/ARB THERAPY RXD/TAKEN: CPT | Mod: CPTII,95,, | Performed by: STUDENT IN AN ORGANIZED HEALTH CARE EDUCATION/TRAINING PROGRAM

## 2022-12-01 PROCEDURE — 4010F PR ACE/ARB THEARPY RXD/TAKEN: ICD-10-PCS | Mod: CPTII,95,, | Performed by: STUDENT IN AN ORGANIZED HEALTH CARE EDUCATION/TRAINING PROGRAM

## 2022-12-01 PROCEDURE — 99407 PR TOBACCO USE CESSATION INTENSIVE >10 MINUTES: ICD-10-PCS | Mod: 95,,, | Performed by: STUDENT IN AN ORGANIZED HEALTH CARE EDUCATION/TRAINING PROGRAM

## 2022-12-01 PROCEDURE — 99214 OFFICE O/P EST MOD 30 MIN: CPT | Mod: 95,25,, | Performed by: STUDENT IN AN ORGANIZED HEALTH CARE EDUCATION/TRAINING PROGRAM

## 2022-12-01 PROCEDURE — 99407 BEHAV CHNG SMOKING > 10 MIN: CPT | Mod: 95,,, | Performed by: STUDENT IN AN ORGANIZED HEALTH CARE EDUCATION/TRAINING PROGRAM

## 2022-12-01 PROCEDURE — 99214 PR OFFICE/OUTPT VISIT, EST, LEVL IV, 30-39 MIN: ICD-10-PCS | Mod: 95,25,, | Performed by: STUDENT IN AN ORGANIZED HEALTH CARE EDUCATION/TRAINING PROGRAM

## 2022-12-01 RX ORDER — VARENICLINE TARTRATE 0.5 (11)-1
KIT ORAL
Qty: 1 EACH | Refills: 0 | Status: SHIPPED | OUTPATIENT
Start: 2022-12-01 | End: 2022-12-12 | Stop reason: SDUPTHER

## 2022-12-01 RX ORDER — MONTELUKAST SODIUM 10 MG/1
10 TABLET ORAL NIGHTLY
Qty: 90 TABLET | Refills: 3 | Status: SHIPPED | OUTPATIENT
Start: 2022-12-01 | End: 2022-12-12 | Stop reason: SDUPTHER

## 2022-12-01 RX ORDER — METOPROLOL SUCCINATE 50 MG/1
50 TABLET, EXTENDED RELEASE ORAL DAILY
Qty: 90 TABLET | Refills: 3 | Status: SHIPPED | OUTPATIENT
Start: 2022-12-01 | End: 2022-12-12 | Stop reason: SDUPTHER

## 2022-12-01 RX ORDER — CETIRIZINE HYDROCHLORIDE 10 MG/1
10 TABLET ORAL DAILY
Qty: 90 TABLET | Refills: 3 | Status: SHIPPED | OUTPATIENT
Start: 2022-12-01 | End: 2022-12-12 | Stop reason: SDUPTHER

## 2022-12-01 RX ORDER — OMEPRAZOLE 40 MG/1
40 CAPSULE, DELAYED RELEASE ORAL EVERY MORNING
Qty: 90 CAPSULE | Refills: 3 | Status: SHIPPED | OUTPATIENT
Start: 2022-12-01 | End: 2022-12-12 | Stop reason: SDUPTHER

## 2022-12-01 RX ORDER — LOSARTAN POTASSIUM 50 MG/1
50 TABLET ORAL NIGHTLY
Qty: 90 TABLET | Refills: 3 | Status: SHIPPED | OUTPATIENT
Start: 2022-12-01 | End: 2022-12-12 | Stop reason: SDUPTHER

## 2022-12-01 NOTE — PROGRESS NOTES
[facility] Primary Care Note    Subjective:    Chief Complaint:   Chief Complaint   Patient presents with    Joint Pain      274}    The HPI and pertinent ROS is included in the Diagnostic Impression Remarks section at the end of the note. Please see below for further details.     Annie is a pleasant intelligent patient who is here for evaluation.     The following portions of the patient's history were reviewed and updated as appropriate: allergies, current medications, past family history, past medical history, past social history, past surgical history and problem list.    She  has a past medical history of Asthma, GERD (gastroesophageal reflux disease), Hypertension, Seizures, and Traumatic tear of left rotator cuff.  She  has a past surgical history that includes Joint replacement;  section; Appendectomy; VAGAL NERVE STIMULATOR placement (Left, 2021); Epidural steroid injection into lumbar spine (N/A, 2021); Injection of joint (Left, 2022); and Replacement of battery of vagus nerve stimulator (Left, 2022).  She  reports that she has been smoking vaping with nicotine. She has a 30.00 pack-year smoking history. She has never used smokeless tobacco. She reports that she does not currently use alcohol. She reports that she does not use drugs.  She family history includes COPD in her father and mother; Cancer in her father; Heart disease in her mother; Hyperlipidemia in her mother; Hypertension in her mother; Stroke in her mother.    Review of patient's allergies indicates:   Allergen Reactions    Depakote [divalproex] Hives    Dilantin [phenytoin sodium extended] Hives       Physical Examination  There were no vitals taken for this visit.   274}  Wt Readings from Last 3 Encounters:   10/13/22 108 kg (238 lb)   22 108 kg (238 lb 1.6 oz)   22 108.4 kg (239 lb)     BP Readings from Last 3 Encounters:   10/13/22 (!) 142/94   22 118/72   22 104/62     Estimated  "body mass index is 42.16 kg/m² as calculated from the following:    Height as of 10/13/22: 5' 3" (1.6 m).    Weight as of 11/1/22: 108 kg (238 lb).     CONSTITUTIONAL: No apparent distress. Does not appear acutely ill or septic. Appears adequately hydrated.  PULM: Breathing unlabored.  PSYCHIATRIC: Alert and conversant and grossly oriented. Mood is grossly neutral. Affect appropriate. Judgment and insight grossly intact.  Integument: normal coloration and turgor, no rashes, no suspicious skin lesions noted.    Data reviewed 274}  Previous medical records reviewed and summarized in HPI.     Laboratory  274}  I have reviewed old labs below:  Lab Results   Component Value Date    WBC 12.47 09/14/2022    HGB 12.7 09/14/2022    HCT 39.0 09/14/2022    MCV 89 09/14/2022     09/14/2022     09/14/2022    K 4.1 09/14/2022     09/14/2022    CALCIUM 9.3 09/14/2022    CO2 24 09/14/2022    GLU 84 09/14/2022    BUN 9 09/14/2022    CREATININE 0.7 09/14/2022    ANIONGAP 10 09/14/2022    ESTGFRAFRICA >60.0 11/29/2021    EGFRNONAA >60.0 11/29/2021    PROT 7.4 11/29/2021    ALBUMIN 4.0 11/29/2021    BILITOT 0.4 11/29/2021    ALKPHOS 77 11/29/2021    ALT 17 11/29/2021    AST 20 11/29/2021    INR 1.0 09/14/2022    CHOL 243 (H) 05/07/2021    TRIG 204 (H) 05/07/2021    HDL 49 05/07/2021    LDLCALC 153.2 05/07/2021    TSH 0.570 11/29/2021    HGBA1C 4.9 05/07/2021     Lab reviewed by me: Particular labs of significance that I will monitor, workup, or treat to improve are mentioned below in diagnostic impression remarks.  :[id number]}274}  Imaging/EKG: I have reviewed the pertinent results/findings and my personal findings are noted below in diagnostic impression remarks.  274}    CC:   Chief Complaint   Patient presents with    Joint Pain        274}  Assessment/Plan  Annie Baird is a 42 y.o. female who presents with:    1. Degenerative disc disease, lumbar    2. Mixed hyperlipidemia    3. Seizure disorder    4. " "Chronic midline low back pain, unspecified whether sciatica present    5. Gastroesophageal reflux disease, unspecified whether esophagitis present    6. Hypertension, unspecified type    7. Non-seasonal allergic rhinitis, unspecified trigger    8. Encounter for screening mammogram for malignant neoplasm of breast    9. Chronic left shoulder pain    10. Morbid obesity        Diagnostic Impression Remarks + HPI     The patient location is:  Patient Home [place]  The chief complaint leading to consultation is:   Chief Complaint   Patient presents with    Joint Pain     Total time spent with patient: 20 minutes     Visit type: Virtual visit with synchronous audio and video  Each patient to whom he or she provides medical services by telemedicine is:  (1) informed of the relationship between the physician and patient and the respective role of any other health care provider with respect to management of the patient; and (2) notified that he or she may decline to receive medical services by telemedicine and may withdraw from such care at any time.    This service was not originating from a related E/M service provided within the previous 7 days nor will  to an E/M service or procedure within the next 24 hours or my soonest available appointment.  Prevailing standard of care was able to be met in this synchronous audio and video visit    Documentation entered by me for this encounter may have been done in part using speech-recognition technology. Although I have made an effort to ensure accuracy, "sound like" errors may exist and should be interpreted in context.     Hyperlipidemia-recommend work on diet exercise monitor will get repeat blood work in the future  Left shoulder pain-present for 6 months occurred after seizure recommend x-rays see Sports Medicine will put in referral no new falls or trauma no weakness dull pain  Hypertension control uncertain does not check her blood pressure consistently recommended " a nurse blood pressure check check blood pressure at home consider increasing losartan if needed    Allergic rhinitis-needs improvement will send in Zyrtec continue Flonase p.r.n. and Singulair    GERD well controlled PPI continue monitor    Morbid obesity -needs improved recommend Mediterranean diet work on weight loss monitor    Nicotine dependence-needs improvement is vaping daily did well on Chantix in the past and will send in she failed the nicotine replacement such as patches in the past    Assistance with smoking cessation was offered, including:  [x]  Medications  [x]  Counseling  []  Printed Information on Smoking Cessation  [x]  Referral to a Smoking Cessation Program    Patient was counseled regarding smoking for >10 minutes.    Health maintenance-recommend mammogram and get blood work in the future.    Seizure disorder-patient reports she is taking Keppra was in [place] is now in [place] wants to reestablish with neurologist wants to see a did will put in referral patient reports no head trauma had a seizure around 2 weeks ago recommend to see neurologist quickly    This is the extent of the patient's complaints at this present time. She denies chest pain upon exertion, dyspnea, nausea, vomiting, diaphoresis, and syncope. No pleuritic chest pain, unilateral leg swelling, calf tenderness, or calf pain. Denies unintentional wt loss sweats and fevers.     Summer will return to clinic in a few months for further workup and reassessment or sooner as needed. She was instructed to call the clinic or go to the emergency department if her symptoms do not improve, worsens, or if new symptoms develop. As we discussed that symptoms could worsen over the next 24 hours she was advised that if any increased swelling, pain, or numbness arise to go immediately to the ED. Patient knows to call any time if an emergency arises. Shared decision making occurred and she verbalized understanding in agreement with this  plan.      274}    Summer will return to clinic in a few months for further workup and reassessment or sooner as needed. She was instructed to call the clinic or go to the emergency department if her symptoms do not improve, worsens, or if new symptoms develop. As we discussed that symptoms could worsen over the next 24 hours she was advised that if any increased swelling, pain, or numbness arise to go immediately to the ED. Patient knows to call any time if an emergency arises. Shared decision making occurred and she verbalized understanding in agreement with this plan.     She was counseled about the importance of cancer screening.     Medication Monitoring    In today's visit, monitoring for drug toxicity was accomplished. Proper use of medications was also discussed.     I discussed imaging findings, diagnosis, possibilities, treatment options, medications, risks, and benefits. She had many questions regarding the options and long-term effects. All questions were answered. She expressed understanding after counseling regarding the diagnosis and recommendations. She was capable and demonstrated competence with understanding of these options. Shared decision making was performed resulting in her choosing the current treatment plan. Patient handout was given with instructions and recommendations. Advised the patient that if they become pregnant to alert us immediately to assess for medication changes.     I also discussed the importance of close follow up to discuss labs, change or modify her medications if needed, monitor side effects, and further evaluation of medical problems.     Additional workup planned: see labs ordered below.    See below for labs and meds ordered with associated diagnosis    1. Degenerative disc disease, lumbar    2. Mixed hyperlipidemia    3. Seizure disorder  - Ambulatory referral/consult to Neurology; Future    4. Chronic midline low back pain, unspecified whether sciatica present    5.  Gastroesophageal reflux disease, unspecified whether esophagitis present  - omeprazole (PRILOSEC) 40 MG capsule; Take 1 capsule (40 mg total) by mouth every morning.  Dispense: 90 capsule; Refill: 3    6. Hypertension, unspecified type  - losartan (COZAAR) 50 MG tablet; Take 1 tablet (50 mg total) by mouth every evening.  Dispense: 90 tablet; Refill: 3  - metoprolol succinate (TOPROL-XL) 50 MG 24 hr tablet; Take 1 tablet (50 mg total) by mouth once daily.  Dispense: 90 tablet; Refill: 3    7. Non-seasonal allergic rhinitis, unspecified trigger  - montelukast (SINGULAIR) 10 mg tablet; Take 1 tablet (10 mg total) by mouth every evening.  Dispense: 90 tablet; Refill: 3  - cetirizine (ZYRTEC) 10 MG tablet; Take 1 tablet (10 mg total) by mouth once daily.  Dispense: 90 tablet; Refill: 3    8. Encounter for screening mammogram for malignant neoplasm of breast  - Mammo Digital Screening Bilat; Future    9. Chronic left shoulder pain  - X-Ray Shoulder Trauma 3 view Left; Future  - Ambulatory referral/consult to Sports Medicine; Future    10. Morbid obesity     Medication List with Changes/Refills   New Medications    CETIRIZINE (ZYRTEC) 10 MG TABLET    Take 1 tablet (10 mg total) by mouth once daily.   Current Medications    ACETAMINOPHEN (TYLENOL) 325 MG TABLET    Take 325 mg by mouth every 6 (six) hours as needed for Pain.    CYCLOBENZAPRINE (FLEXERIL) 10 MG TABLET    as needed    EPIDIOLEX 100 MG/ML    GIVE 8.2 ML BY MOUTH 2 TIMES A DAY.    GABAPENTIN (NEURONTIN) 600 MG TABLET    Take 1 tablet (600 mg total) by mouth 3 (three) times daily.    HYDROCODONE-ACETAMINOPHEN (NORCO) 7.5-325 MG PER TABLET    Take 1 tablet by mouth every 6 (six) hours as needed for Pain.    LAMOTRIGINE (LAMICTAL) 150 MG TAB    Take 2 tablets (300 mg total) by mouth 2 (two) times daily. Take with 25 mg tablets for dose of 325 mg BID    LAMOTRIGINE (LAMICTAL) 25 MG TABLET    Take 1 tablet (25 mg total) by mouth 2 (two) times daily.    LEVETIRACETAM  (KEPPRA) 750 MG TAB    Take 2 tablets (1,500 mg total) by mouth 2 (two) times daily.    LIDOCAINE (LIDODERM) 5 %    Place 1 patch onto the skin every 24 hours. Remove & Discard patch within 12 hours or as directed by MD    LORAZEPAM (ATIVAN) 0.5 MG TABLET    Take 0.5 mg by mouth every 6 (six) hours as needed for Anxiety.    MULTIVITAMIN WITH MINERALS TABLET    Take 1 tablet by mouth once daily.    NAPROXEN (NAPROSYN) 500 MG TABLET    Take 500 mg by mouth 2 (two) times daily as needed.    NORTRIPTYLINE (PAMELOR) 25 MG CAPSULE    Take 25 mg by mouth every evening.    ONDANSETRON (ZOFRAN) 4 MG TABLET    Take 8 mg by mouth every 8 (eight) hours as needed.     POTASSIUM GLUCONATE 595 MG (99 MG) TAB    Take 1 tablet by mouth once daily.    TENS UNITS MISC    by Misc.(Non-Drug; Combo Route) route.    TIZANIDINE (ZANAFLEX) 4 MG TABLET    Take 4 mg by mouth 2 (two) times daily.    VITAMIN D (VITAMIN D3) 1000 UNITS TAB    Take 1,000 Units by mouth once daily.   Changed and/or Refilled Medications    Modified Medication Previous Medication    LOSARTAN (COZAAR) 50 MG TABLET losartan (COZAAR) 50 MG tablet       Take 1 tablet (50 mg total) by mouth every evening.    Take 1 tablet (50 mg total) by mouth every evening.    METOPROLOL SUCCINATE (TOPROL-XL) 50 MG 24 HR TABLET metoprolol succinate (TOPROL-XL) 50 MG 24 hr tablet       Take 1 tablet (50 mg total) by mouth once daily.    Take 1 tablet (50 mg total) by mouth once daily.    MONTELUKAST (SINGULAIR) 10 MG TABLET montelukast (SINGULAIR) 10 mg tablet       Take 1 tablet (10 mg total) by mouth every evening.    Take 1 tablet (10 mg total) by mouth every evening.    OMEPRAZOLE (PRILOSEC) 40 MG CAPSULE omeprazole (PRILOSEC) 40 MG capsule       Take 1 capsule (40 mg total) by mouth every morning.    Take 1 capsule (40 mg total) by mouth every morning.     Modified Medications    Modified Medication Previous Medication    LOSARTAN (COZAAR) 50 MG TABLET losartan (COZAAR) 50 MG tablet  "      Take 1 tablet (50 mg total) by mouth every evening.    Take 1 tablet (50 mg total) by mouth every evening.    METOPROLOL SUCCINATE (TOPROL-XL) 50 MG 24 HR TABLET metoprolol succinate (TOPROL-XL) 50 MG 24 hr tablet       Take 1 tablet (50 mg total) by mouth once daily.    Take 1 tablet (50 mg total) by mouth once daily.    MONTELUKAST (SINGULAIR) 10 MG TABLET montelukast (SINGULAIR) 10 mg tablet       Take 1 tablet (10 mg total) by mouth every evening.    Take 1 tablet (10 mg total) by mouth every evening.    OMEPRAZOLE (PRILOSEC) 40 MG CAPSULE omeprazole (PRILOSEC) 40 MG capsule       Take 1 capsule (40 mg total) by mouth every morning.    Take 1 capsule (40 mg total) by mouth every morning.       Andre Kirk MD  12/01/2022     Documentation entered by me for this encounter may have been done in part using speech-recognition technology. Although I have made an effort to ensure accuracy, "sound like" errors may exist and should be interpreted in context.    If you are due for any health screening(s) below please notify me so we can arrange them to be ordered and scheduled to maintain your health. Most healthy patients at your age complete them, but you are free to accept or refuse.   Tests to Keep You Healthy    Mammogram: ORDERED BUT NOT SCHEDULED  Cervical Cancer Screening: Met on 7/28/2022  Last Blood Pressure <= 139/89 (9/21/2022): Yes  Tobacco Cessation: NO         "

## 2022-12-12 ENCOUNTER — TELEPHONE (OUTPATIENT)
Dept: PAIN MEDICINE | Facility: CLINIC | Age: 42
End: 2022-12-12
Payer: COMMERCIAL

## 2022-12-12 DIAGNOSIS — F17.200 NICOTINE DEPENDENCE, UNCOMPLICATED, UNSPECIFIED NICOTINE PRODUCT TYPE: ICD-10-CM

## 2022-12-12 DIAGNOSIS — I10 HYPERTENSION, UNSPECIFIED TYPE: ICD-10-CM

## 2022-12-12 DIAGNOSIS — M54.50 CHRONIC MIDLINE LOW BACK PAIN, UNSPECIFIED WHETHER SCIATICA PRESENT: ICD-10-CM

## 2022-12-12 DIAGNOSIS — M54.16 LUMBAR RADICULOPATHY: Primary | ICD-10-CM

## 2022-12-12 DIAGNOSIS — J30.89 NON-SEASONAL ALLERGIC RHINITIS, UNSPECIFIED TRIGGER: ICD-10-CM

## 2022-12-12 DIAGNOSIS — G89.29 CHRONIC MIDLINE LOW BACK PAIN, UNSPECIFIED WHETHER SCIATICA PRESENT: ICD-10-CM

## 2022-12-12 DIAGNOSIS — K21.9 GASTROESOPHAGEAL REFLUX DISEASE, UNSPECIFIED WHETHER ESOPHAGITIS PRESENT: ICD-10-CM

## 2022-12-12 NOTE — TELEPHONE ENCOUNTER
Unable to retrieve patient chart and identify care gaps.  [facility] Embedded Care Gaps. Reference number: [id number]. 12/12/2022   3:50:26 PM CST

## 2022-12-12 NOTE — TELEPHONE ENCOUNTER
Scheduled jasmyn from 11/16/22 Ov said she had been moving . 1/19/2023. Denies asa, thinner, or motrin use

## 2022-12-12 NOTE — TELEPHONE ENCOUNTER
----- Message from Abeba Araujo sent at 12/12/2022  2:49 PM CST -----  Regarding: refill  Can the clinic reply in [facility]: no       Please refill the medication(s) listed below. Please call the patient when the prescription(s) is ready for  at this phone number   Pt would like the provider to begin the process for her to start getting her meds through the [facility] mail order pharmacy. She would like the January scripts to go there.      Medication #1 varenicline (CHANTIX STARTING MONTH BOX) 0.5 mg (11)- 1 mg (42) tablet    Medication #2 montelukast (SINGULAIR) 10 mg tablet    Medication #3 cetirizine (ZYRTEC) 10 MG tablet    Medication #4 omeprazole (PRILOSEC) 40 MG capsule    Medication #5 losartan (COZAAR) 50 MG tablet    Medication #6 metoprolol succinate (TOPROL-XL) 50 MG 24 hr tablet    Medication #7 gabapentin (NEURONTIN) 600 MG tablet      Preferred Pharmacy:     [facility] ( [facility] )  [id number]  [place], [place]  [phone] phone

## 2022-12-12 NOTE — TELEPHONE ENCOUNTER
----- Message from Carolin Mina sent at 12/12/2022  3:02 PM CST -----  Contact: patient  Type:   Apoointment Request      Name of Caller: patient     When is the first available appointment? N/a     Symptoms: procedure     Would the patient rather a call back or a response via [facility]? Call     Best Call Back Number [phone]    Additional Information:

## 2022-12-13 RX ORDER — VARENICLINE TARTRATE 0.5 (11)-1
KIT ORAL
Qty: 1 EACH | Refills: 0 | Status: SHIPPED | OUTPATIENT
Start: 2022-12-13 | End: 2023-02-09 | Stop reason: SDUPTHER

## 2022-12-13 RX ORDER — GABAPENTIN 600 MG/1
600 TABLET ORAL 3 TIMES DAILY
Qty: 90 TABLET | Refills: 0 | Status: SHIPPED | OUTPATIENT
Start: 2022-12-13 | End: 2023-01-23

## 2022-12-13 RX ORDER — MONTELUKAST SODIUM 10 MG/1
10 TABLET ORAL NIGHTLY
Qty: 90 TABLET | Refills: 3 | Status: SHIPPED | OUTPATIENT
Start: 2022-12-13 | End: 2023-03-13

## 2022-12-13 RX ORDER — OMEPRAZOLE 40 MG/1
40 CAPSULE, DELAYED RELEASE ORAL EVERY MORNING
Qty: 90 CAPSULE | Refills: 3 | Status: SHIPPED | OUTPATIENT
Start: 2022-12-13 | End: 2023-10-15

## 2022-12-13 RX ORDER — METOPROLOL SUCCINATE 50 MG/1
50 TABLET, EXTENDED RELEASE ORAL DAILY
Qty: 90 TABLET | Refills: 3 | Status: SHIPPED | OUTPATIENT
Start: 2022-12-13 | End: 2023-10-24

## 2022-12-13 RX ORDER — CETIRIZINE HYDROCHLORIDE 10 MG/1
10 TABLET ORAL DAILY
Qty: 90 TABLET | Refills: 3 | Status: SHIPPED | OUTPATIENT
Start: 2022-12-13 | End: 2024-01-30 | Stop reason: SDUPTHER

## 2022-12-13 RX ORDER — LOSARTAN POTASSIUM 50 MG/1
50 TABLET ORAL NIGHTLY
Qty: 90 TABLET | Refills: 3 | Status: SHIPPED | OUTPATIENT
Start: 2022-12-13 | End: 2023-09-26

## 2022-12-14 ENCOUNTER — TELEPHONE (OUTPATIENT)
Dept: PAIN MEDICINE | Facility: CLINIC | Age: 42
End: 2022-12-14
Payer: COMMERCIAL

## 2022-12-14 NOTE — TELEPHONE ENCOUNTER
----- Message from Marin Velasco Patient Care Assistant sent at 12/14/2022 12:28 PM CST -----  Contact: Pt  Type: Return Call    Who Called: Pt  Who Left Message for Pt: Susanna  Does the patient know what this is regarding: Yes  Best Call Back Number: [phone]  Thank you~

## 2022-12-16 ENCOUNTER — OFFICE VISIT (OUTPATIENT)
Dept: PAIN MEDICINE | Facility: CLINIC | Age: 42
End: 2022-12-16
Payer: COMMERCIAL

## 2022-12-16 ENCOUNTER — TELEPHONE (OUTPATIENT)
Dept: PAIN MEDICINE | Facility: CLINIC | Age: 42
End: 2022-12-16

## 2022-12-16 DIAGNOSIS — M54.16 LUMBAR RADICULOPATHY: ICD-10-CM

## 2022-12-16 DIAGNOSIS — M51.36 DDD (DEGENERATIVE DISC DISEASE), LUMBAR: Primary | ICD-10-CM

## 2022-12-16 DIAGNOSIS — Z79.891 CHRONIC USE OF OPIATE FOR THERAPEUTIC PURPOSE: ICD-10-CM

## 2022-12-16 DIAGNOSIS — G89.4 CHRONIC PAIN DISORDER: ICD-10-CM

## 2022-12-16 DIAGNOSIS — G89.4 CHRONIC PAIN DISORDER: Primary | ICD-10-CM

## 2022-12-16 DIAGNOSIS — M51.36 DDD (DEGENERATIVE DISC DISEASE), LUMBAR: ICD-10-CM

## 2022-12-16 DIAGNOSIS — M53.3 SACROILIAC JOINT PAIN: ICD-10-CM

## 2022-12-16 DIAGNOSIS — M50.30 DDD (DEGENERATIVE DISC DISEASE), CERVICAL: ICD-10-CM

## 2022-12-16 PROCEDURE — 99999 PR PBB SHADOW E&M-EST. PATIENT-LVL III: ICD-10-PCS | Mod: PBBFAC,,,

## 2022-12-16 PROCEDURE — 1160F PR REVIEW ALL MEDS BY PRESCRIBER/CLIN PHARMACIST DOCUMENTED: ICD-10-PCS | Mod: CPTII,S$GLB,,

## 2022-12-16 PROCEDURE — 1159F PR MEDICATION LIST DOCUMENTED IN MEDICAL RECORD: ICD-10-PCS | Mod: CPTII,S$GLB,,

## 2022-12-16 PROCEDURE — 4010F PR ACE/ARB THEARPY RXD/TAKEN: ICD-10-PCS | Mod: CPTII,S$GLB,,

## 2022-12-16 PROCEDURE — 80326 AMPHETAMINES 5 OR MORE: CPT

## 2022-12-16 PROCEDURE — 99214 PR OFFICE/OUTPT VISIT, EST, LEVL IV, 30-39 MIN: ICD-10-PCS | Mod: S$GLB,,,

## 2022-12-16 PROCEDURE — 4010F ACE/ARB THERAPY RXD/TAKEN: CPT | Mod: CPTII,S$GLB,,

## 2022-12-16 PROCEDURE — 1160F RVW MEDS BY RX/DR IN RCRD: CPT | Mod: CPTII,S$GLB,,

## 2022-12-16 PROCEDURE — 99999 PR PBB SHADOW E&M-EST. PATIENT-LVL III: CPT | Mod: PBBFAC,,,

## 2022-12-16 PROCEDURE — 1159F MED LIST DOCD IN RCRD: CPT | Mod: CPTII,S$GLB,,

## 2022-12-16 PROCEDURE — 99214 OFFICE O/P EST MOD 30 MIN: CPT | Mod: S$GLB,,,

## 2022-12-16 RX ORDER — HYDROCODONE BITARTRATE AND ACETAMINOPHEN 7.5; 325 MG/1; MG/1
1 TABLET ORAL EVERY 6 HOURS PRN
Qty: 120 TABLET | Refills: 0 | Status: SHIPPED | OUTPATIENT
Start: 2022-12-16 | End: 2023-02-09

## 2022-12-16 RX ORDER — MIDAZOLAM 5 MG/.1ML
SPRAY NASAL
COMMUNITY
Start: 2020-07-23 | End: 2023-02-09

## 2022-12-16 RX ORDER — OXYCODONE AND ACETAMINOPHEN 5; 325 MG/1; MG/1
1 TABLET ORAL EVERY 6 HOURS PRN
Qty: 120 TABLET | Refills: 0 | Status: SHIPPED | OUTPATIENT
Start: 2022-12-16 | End: 2023-01-13 | Stop reason: SDUPTHER

## 2022-12-16 RX ORDER — HYDROCODONE BITARTRATE AND ACETAMINOPHEN 7.5; 325 MG/1; MG/1
1 TABLET ORAL EVERY 6 HOURS PRN
Qty: 120 TABLET | Refills: 0 | Status: CANCELLED | OUTPATIENT
Start: 2022-12-16

## 2022-12-16 NOTE — PROGRESS NOTES
FOLLOW UP NOTE:     CHIEF COMPLAINT: neck, back, leg, and arm pain  Visit type: Virtual visit with synchronous audio and video  Total time spent with patient: 25 minutes  Each patient to whom he or she provides medical services by telemedicine is:  (1) informed of the relationship between the physician and patient and the respective role of any other health care provider with respect to management of the patient; and (2) notified that he or she may decline to receive medical services by telemedicine and may withdraw from such care at any time.  Patient is at home in [place].     INITIAL HISTORY OF PRESENT ILLNESS: Annie Baird is a 41 y.o. female with PMH significant for seizure disorder s/p vagal nerve stimulator placement, HTN, hx of anxiety on chronic benzodiazepines, hx of left shoulder arthroscopic surgery (12/4/2012), hx of right CTS release (11/22/2013), hx of multiple CVA's (residual left sided weakness per patient and daughter report), narcolepsy, insomnia, and hx of C5-C6 ACDF (5/1/2019) presents as a referral for the evaluation of multiple pain complaints. The patient reports that her low back pain is the worst of her pains. The patient has had a rather extensive work-up in [place], and I have put the relevant diagnostic tests in my note below. In summary:     The patient reports that her pain began approximately 1 year ago when she experienced low back pain. The patient denies of any inciting incident or trauma. The patient reports of worsening pain over time. She reported of progressive symptoms that extended into her lower extremities. She eventually presented to the hospital for evaluation of possible cauda equina syndrome. CT myelogram results copied below. In regards to her pain: the patient localizes her pain to center of her lower back. The patient reports of radiation to her bilateral buttocks and down the posterior aspect of her RLE to her foot. The patient describes her pain as  a sharp, stinging, and burning type of pain. The patient reports of numbness in her left 2nd, 3rd, and 4th digits in her hand and her 1st and 2nd toes bilaterally. The patient reports that her pain is a 6/10. Patient denies of any fecal incontinence or saddle anesthesia. The patient reports of daily episodes of urinary incontinence which has been ongoing for the past few months. The patient reports of requiring the assistance of a wheelchair for ambulation for the past few months.      Aggravating factors: activity in general     Mitigating factors: laying on her left side     Relevant Surgeries: yes; history of cervical spine surgery X 1     Interventional Therapies: yes; Dr. Chun Pat in [place]. Dr. Pat did RFA's and epidurals. No benefit.     INTERVAL HISTORY OF PRESENT ILLNESS: Annie Baird is a 42 y.o. female with PMH significant for seizure disorder s/p vagal nerve stimulator placement, HTN, hx of anxiety on chronic benzodiazepines, hx of left shoulder arthroscopic surgery (12/4/2012), hx of right CTS release (11/22/2013), hx of multiple CVA's (residual left sided weakness per patient and daughter report), narcolepsy, insomnia, and hx of C5-C6 ACDF (5/1/2019) presents as an established patientm, new to me, for the continued management of chronic pain in multiple areas. The patient presents today for medication refill. She has recently relocated back to [place]. The patient reports that during her recent move her daughter packed all of her medications including her pain medication and she can not find them anywhere. The patient reports she had to contact her PCP as well to have all her medication resent to pharmacy.  CC Today is worsening pain down right LE, posterior thigh to her right foot. She has benefited in the past from IESI at L5-S1 and requests a repeat. The patient is scheduled for PATRICIA on 1/19/22 with Dr. Evans. Today, the patient complains of pain in multiple locations which including her low  back, mid back near her shoulder blades, neck, feet, and hands. The patient reports that activity in general worsens her pain. The patient is tolerating her current pain regimen without adverse side effects. The patient denies of any significant changes in her health since her last appointment. The patient also denies of any changes in the character of her pain since her last appointment. The patient reports that her current pain is a 9/10. Patient denies of any urinary/fecal incontinence, saddle anesthesia, or weakness.      INTERVENTIONAL PAIN HISTORY:  7/12/2021:  Lumbar interlaminar epidural steroid injection at L5-S1 under fluoroscopic guidance (left paramedian approach) - improvement for a few days    CURRENT PAIN MEDICATIONS:   gabapentin 600/600/900 mg PO TID  Norco 7.5- 325 mg PO q 6 hr  zofran 4 mg PO q 8 hr     Previously taking:   Ativan (once or twice a month)  temazepam 30 mg PO QHS (insomnia)        ROS:  Review of Systems   Constitutional:  Negative for chills and fever.   HENT:  Negative for sore throat.    Eyes:  Negative for visual disturbance.   Respiratory:  Negative for shortness of breath.    Cardiovascular:  Negative for chest pain.   Gastrointestinal:  Negative for nausea and vomiting.   Genitourinary:  Negative for difficulty urinating.   Musculoskeletal:  Positive for arthralgias, back pain, gait problem, myalgias and neck pain.   Skin:  Negative for rash.   Allergic/Immunologic: Negative for immunocompromised state.   Neurological:  Positive for seizures, weakness and numbness. Negative for syncope.   Hematological:  Does not bruise/bleed easily.   Psychiatric/Behavioral:  Negative for suicidal ideas.    All other systems reviewed and are negative.     MEDICAL, SURGICAL, FAMILY, SOCIAL HX: reviewed    MEDICATIONS/ALLERGIES: reviewed    PHYSICAL EXAM:    GENERAL: A and O x3, the patient appears well groomed and is in no acute distress.  Skin: No rashes or obvious lesions  HEENT:  normocephalic, atraumatic  LUNGS: non labored breathing  ABDOMEN: non distended  UPPER EXTREMITIES and lower extremities. Normal ROM  CERVICAL SPINE:  Cervical spine: ROM is full in flexion, extension and lateral rotation without increased pain.    LUMBAR SPINE  Lumbar spine: ROM is limited with flexion extension and oblique extension with mild-to-moderate increased pain.      MENTAL STATUS: normal orientation, speech, language, and fund of knowledge for social situation.  Emotional state appropriate.    GAIT: normal rise, base, steps, and arm swing.      GAIT: antalgic gait; unsteady gait    IMAGING:    X-ray cervical spine (8/3/2021):  There is no detectable change in the appearance of the cervical spine compared to the prior study.  There are postsurgical changes of ACDF at the C5-6 level.  Hardware is intact.  Interbody fusion device is unchanged in appearance.  There is mild endplate osteophyte formation at the C4-5 level.  Vertebral body height and alignment are normal.  There is no fracture.  The odontoid process is intact.  The patient has a vagal stimulator.  Otherwise, the soft tissues are normal.     X-ray thoracic spine (7/23/2021):  The thoracic vertebral bodies maintain normal height and alignment without significant disc space narrowing. The posterior elements are intact. There is mild osteophyte formation at the T11-12 and T12-L1 levels. The paraspinous soft tissues are normal.    ASSESSMENT: Annie Baird is a 41 y.o. female with PMH significant for seizure disorder s/p vagal nerve stimulator placement, HTN, hx of anxiety on chronic benzodiazepines, hx of left shoulder arthroscopic surgery (12/4/2012), hx of right CTS release (11/22/2013), hx of multiple CVA's (residual left sided weakness per patient and daughter report), narcolepsy, insomnia, and hx of C5-C6 ACDF (5/1/2019) presents as an established patient, new to me, for the continued management of chronic pain in multiple areas. The  patient presents today for medication refill. Most recent UDS positive for Hydrocodone and Percocet, patient had a surgery where she provided Percocet for post op pain management. Treatment plan outlined below.     PLAN:  1. Prescribed Norco 7.5-325 mg PO q 6 hr PRN for breakthrough pain; # 120 tablets; 0 refills.  reviewed without discrepancies. RX printed due to medication shortage so patient could find a pharmacy.    2. Continue gabapentin as prescribed for neuropathic pain.  3. UDS collected today, last had pain medication 6 days ago.    4. Can call for refill of Norco, if needed prior to her PATRICIA F/U appt.   5. RTC for the procedure as outlined above   All medication management performed by Dr. Vi Leone, NP

## 2022-12-16 NOTE — TELEPHONE ENCOUNTER
----- Message from Gracy Agustin, Patient Care Assistant sent at 12/16/2022 12:32 PM CST -----  Regarding: advice  Contact: pt  Type: Needs Medical Advice  Who Called:  pt   Best Call Back Number: [phone]    Additional Information: pt states she would like a callback regarding HYDROcodone-acetaminophen (NORCO) 7.5-325 mg per tablet being out of stock. Please call pt to advise. Thanks!

## 2022-12-16 NOTE — TELEPHONE ENCOUNTER
Out of stock, pt said checked at other drug stores and all where out. Can she be changed to something else?Please advise and thank you.

## 2022-12-22 LAB
6MAM UR QL: NOT DETECTED
7AMINOCLONAZEPAM UR QL: NOT DETECTED
A-OH ALPRAZ UR QL: NOT DETECTED
ALPHA-OH-MIDAZOLAM: NOT DETECTED
ALPRAZ UR QL: NOT DETECTED
AMPHET UR QL SCN: NOT DETECTED
ANNOTATION COMMENT IMP: NORMAL
ANNOTATION COMMENT IMP: NORMAL
BARBITURATES UR QL: NOT DETECTED
BUPRENORPHINE UR QL: NOT DETECTED
BZE UR QL: NOT DETECTED
CARBOXYTHC UR QL: NOT DETECTED
CARISOPRODOL UR QL: NOT DETECTED
CLONAZEPAM UR QL: NOT DETECTED
CODEINE UR QL: NOT DETECTED
CREAT UR-MCNC: 124.6 MG/DL (ref 20–400)
DIAZEPAM UR QL: NOT DETECTED
ETHYL GLUCURONIDE UR QL: NOT DETECTED
FENTANYL UR QL: NOT DETECTED
GABAPENTIN: PRESENT
HYDROCODONE UR QL: NOT DETECTED
HYDROMORPHONE UR QL: NOT DETECTED
LORAZEPAM UR QL: NOT DETECTED
MDA UR QL: NOT DETECTED
MDEA UR QL: NOT DETECTED
MDMA UR QL: NOT DETECTED
ME-PHENIDATE UR QL: NOT DETECTED
METHADONE UR QL: NOT DETECTED
METHAMPHET UR QL: NOT DETECTED
MIDAZOLAM UR QL SCN: NOT DETECTED
MORPHINE UR QL: NOT DETECTED
NALOXONE: NOT DETECTED
NORBUPRENORPHINE UR QL CFM: NOT DETECTED
NORDIAZEPAM UR QL: NOT DETECTED
NORFENTANYL UR QL: NOT DETECTED
NORHYDROCODONE UR QL CFM: NOT DETECTED
NORMEPERIDINE UR QL CFM: NOT DETECTED
NOROXYCODONE UR QL CFM: NOT DETECTED
NOROXYMORPHONE UR QL SCN: NOT DETECTED
OXAZEPAM UR QL: NOT DETECTED
OXYCODONE UR QL: NOT DETECTED
OXYMORPHONE UR QL: NOT DETECTED
PATHOLOGY STUDY: NORMAL
PCP UR QL: NOT DETECTED
PHENTERMINE UR QL: NOT DETECTED
PREGABALIN: NOT DETECTED
SERVICE CMNT-IMP: NORMAL
TAPENTADOL UR QL SCN: NOT DETECTED
TAPENTADOL UR QL SCN: NOT DETECTED
TEMAZEPAM UR QL: NOT DETECTED
TRAMADOL UR QL: NOT DETECTED
ZOLPIDEM METABOLITE: NOT DETECTED
ZOLPIDEM UR QL: NOT DETECTED

## 2023-01-06 ENCOUNTER — TELEPHONE (OUTPATIENT)
Dept: NEUROLOGY | Facility: CLINIC | Age: 43
End: 2023-01-06
Payer: COMMERCIAL

## 2023-01-12 ENCOUNTER — PATIENT MESSAGE (OUTPATIENT)
Dept: ADMINISTRATIVE | Facility: HOSPITAL | Age: 43
End: 2023-01-12
Payer: COMMERCIAL

## 2023-01-12 ENCOUNTER — TELEPHONE (OUTPATIENT)
Dept: NEUROLOGY | Facility: CLINIC | Age: 43
End: 2023-01-12
Payer: COMMERCIAL

## 2023-01-13 ENCOUNTER — PATIENT MESSAGE (OUTPATIENT)
Dept: PAIN MEDICINE | Facility: CLINIC | Age: 43
End: 2023-01-13
Payer: COMMERCIAL

## 2023-01-13 DIAGNOSIS — M50.30 DDD (DEGENERATIVE DISC DISEASE), CERVICAL: ICD-10-CM

## 2023-01-13 DIAGNOSIS — M53.3 SACROILIAC JOINT PAIN: ICD-10-CM

## 2023-01-13 DIAGNOSIS — M54.16 LUMBAR RADICULOPATHY: ICD-10-CM

## 2023-01-13 DIAGNOSIS — G89.4 CHRONIC PAIN DISORDER: ICD-10-CM

## 2023-01-13 DIAGNOSIS — M51.36 DDD (DEGENERATIVE DISC DISEASE), LUMBAR: ICD-10-CM

## 2023-01-13 RX ORDER — OXYCODONE AND ACETAMINOPHEN 5; 325 MG/1; MG/1
1 TABLET ORAL EVERY 6 HOURS PRN
Qty: 120 TABLET | Refills: 0 | Status: SHIPPED | OUTPATIENT
Start: 2023-01-13 | End: 2023-01-13 | Stop reason: SDUPTHER

## 2023-01-13 RX ORDER — OXYCODONE AND ACETAMINOPHEN 5; 325 MG/1; MG/1
1 TABLET ORAL EVERY 6 HOURS PRN
Qty: 120 TABLET | Refills: 0 | Status: SHIPPED | OUTPATIENT
Start: 2023-01-13 | End: 2023-02-09 | Stop reason: SDUPTHER

## 2023-01-13 NOTE — TELEPHONE ENCOUNTER
[facility] was out of perocet, called and verified, request be sent to [facility] on [address]. Please refill if appropriate. Thank you

## 2023-01-13 NOTE — TELEPHONE ENCOUNTER
This pt called back and asked if meds could be sentr to [facility] please, they have the meds. Will call [facility] and tell them to disrequard and d/c script sent to them

## 2023-01-18 ENCOUNTER — TELEPHONE (OUTPATIENT)
Dept: PAIN MEDICINE | Facility: CLINIC | Age: 43
End: 2023-01-18
Payer: COMMERCIAL

## 2023-01-18 NOTE — TELEPHONE ENCOUNTER
----- Message from Gracy Agustin, Patient Care Assistant sent at 1/18/2023  4:01 PM CST -----  Regarding: appointment  Contact: pt  Type: Needs Medical Advice  Who Called:  pt   Best Call Back Number: [phone] (home)     Additional Information: pt states she would like a callback regarding rescheduling her appointment on 1/19/23. Please call pt to advise. Thanks!

## 2023-01-18 NOTE — TELEPHONE ENCOUNTER
----- Message from Gracy Agustin, Patient Care Assistant sent at 1/18/2023  4:01 PM CST -----  Regarding: appointment  Contact: pt  Type: Needs Medical Advice  Who Called:  pt   Best Call Back Number: [phone] (home)     Additional Information: pt states she would like a callback regarding rescheduling her appointment on 1/19/23. Please call pt to advise. Thanks!

## 2023-01-22 DIAGNOSIS — M54.50 CHRONIC MIDLINE LOW BACK PAIN, UNSPECIFIED WHETHER SCIATICA PRESENT: ICD-10-CM

## 2023-01-22 DIAGNOSIS — G89.29 CHRONIC MIDLINE LOW BACK PAIN, UNSPECIFIED WHETHER SCIATICA PRESENT: ICD-10-CM

## 2023-01-23 RX ORDER — GABAPENTIN 600 MG/1
TABLET ORAL
Qty: 90 TABLET | Refills: 1 | Status: SHIPPED | OUTPATIENT
Start: 2023-01-23 | End: 2023-03-28 | Stop reason: SDUPTHER

## 2023-01-23 NOTE — TELEPHONE ENCOUNTER
No new care gaps identified.  [facility] Embedded Care Gaps. Reference number: [id number]. 1/22/2023   7:50:52 PM CST

## 2023-01-26 ENCOUNTER — PATIENT MESSAGE (OUTPATIENT)
Dept: NEUROSURGERY | Facility: CLINIC | Age: 43
End: 2023-01-26
Payer: COMMERCIAL

## 2023-01-26 ENCOUNTER — PATIENT MESSAGE (OUTPATIENT)
Dept: PAIN MEDICINE | Facility: CLINIC | Age: 43
End: 2023-01-26
Payer: COMMERCIAL

## 2023-01-26 NOTE — TELEPHONE ENCOUNTER
Spoke to patient and advised her to get in touch with Neurologist to have VNS interrogated asap. She understands and said she will reach out to neurologist to get scheduled. She knows to reach back out if there is anything we can do to help facilitate.;  We also confirmed r/s PO visit with Heather to discuss her symptoms.

## 2023-02-07 DIAGNOSIS — Z00.00 ENCOUNTER FOR MEDICARE ANNUAL WELLNESS EXAM: ICD-10-CM

## 2023-02-09 ENCOUNTER — PATIENT MESSAGE (OUTPATIENT)
Dept: PAIN MEDICINE | Facility: CLINIC | Age: 43
End: 2023-02-09
Payer: COMMERCIAL

## 2023-02-09 ENCOUNTER — OFFICE VISIT (OUTPATIENT)
Dept: PAIN MEDICINE | Facility: CLINIC | Age: 43
End: 2023-02-09
Payer: COMMERCIAL

## 2023-02-09 DIAGNOSIS — M51.36 DDD (DEGENERATIVE DISC DISEASE), LUMBAR: ICD-10-CM

## 2023-02-09 DIAGNOSIS — G89.29 CHRONIC MIDLINE LOW BACK PAIN, UNSPECIFIED WHETHER SCIATICA PRESENT: ICD-10-CM

## 2023-02-09 DIAGNOSIS — M54.50 CHRONIC MIDLINE LOW BACK PAIN, UNSPECIFIED WHETHER SCIATICA PRESENT: ICD-10-CM

## 2023-02-09 DIAGNOSIS — M50.30 DDD (DEGENERATIVE DISC DISEASE), CERVICAL: ICD-10-CM

## 2023-02-09 DIAGNOSIS — J30.89 NON-SEASONAL ALLERGIC RHINITIS, UNSPECIFIED TRIGGER: ICD-10-CM

## 2023-02-09 DIAGNOSIS — G89.4 CHRONIC PAIN DISORDER: ICD-10-CM

## 2023-02-09 DIAGNOSIS — M53.3 SACROILIAC JOINT PAIN: ICD-10-CM

## 2023-02-09 DIAGNOSIS — M54.16 LUMBAR RADICULOPATHY: ICD-10-CM

## 2023-02-09 DIAGNOSIS — Z00.00 ENCOUNTER FOR MEDICARE ANNUAL WELLNESS EXAM: ICD-10-CM

## 2023-02-09 DIAGNOSIS — G89.4 CHRONIC PAIN DISORDER: Primary | ICD-10-CM

## 2023-02-09 DIAGNOSIS — I10 HYPERTENSION, UNSPECIFIED TYPE: ICD-10-CM

## 2023-02-09 DIAGNOSIS — M51.36 DDD (DEGENERATIVE DISC DISEASE), LUMBAR: Primary | ICD-10-CM

## 2023-02-09 DIAGNOSIS — K21.9 GASTROESOPHAGEAL REFLUX DISEASE, UNSPECIFIED WHETHER ESOPHAGITIS PRESENT: ICD-10-CM

## 2023-02-09 PROCEDURE — 1159F MED LIST DOCD IN RCRD: CPT | Mod: S$GLB,,,

## 2023-02-09 PROCEDURE — 1159F PR MEDICATION LIST DOCUMENTED IN MEDICAL RECORD: ICD-10-PCS | Mod: S$GLB,,,

## 2023-02-09 PROCEDURE — 1160F PR REVIEW ALL MEDS BY PRESCRIBER/CLIN PHARMACIST DOCUMENTED: ICD-10-PCS | Mod: S$GLB,,,

## 2023-02-09 PROCEDURE — 99214 OFFICE O/P EST MOD 30 MIN: CPT | Mod: S$GLB,,,

## 2023-02-09 PROCEDURE — 99214 PR OFFICE/OUTPT VISIT, EST, LEVL IV, 30-39 MIN: ICD-10-PCS | Mod: S$GLB,,,

## 2023-02-09 PROCEDURE — 99999 PR PBB SHADOW E&M-EST. PATIENT-LVL III: ICD-10-PCS | Mod: PBBFAC,,,

## 2023-02-09 PROCEDURE — 99999 PR PBB SHADOW E&M-EST. PATIENT-LVL III: CPT | Mod: PBBFAC,,,

## 2023-02-09 PROCEDURE — 1160F RVW MEDS BY RX/DR IN RCRD: CPT | Mod: S$GLB,,,

## 2023-02-09 RX ORDER — HYDROXYZINE HYDROCHLORIDE 25 MG/1
TABLET, FILM COATED ORAL
COMMUNITY
Start: 2022-10-24 | End: 2023-02-09

## 2023-02-09 RX ORDER — OXYCODONE AND ACETAMINOPHEN 5; 325 MG/1; MG/1
1 TABLET ORAL EVERY 6 HOURS PRN
Qty: 120 TABLET | Refills: 0 | Status: SHIPPED | OUTPATIENT
Start: 2023-02-10 | End: 2023-03-08 | Stop reason: SDUPTHER

## 2023-02-09 RX ORDER — MONTELUKAST SODIUM 10 MG/1
10 TABLET ORAL NIGHTLY
Qty: 90 TABLET | Refills: 3 | Status: CANCELLED | OUTPATIENT
Start: 2023-02-09 | End: 2023-05-10

## 2023-02-09 RX ORDER — OMEPRAZOLE 40 MG/1
40 CAPSULE, DELAYED RELEASE ORAL EVERY MORNING
Qty: 90 CAPSULE | Refills: 3 | Status: CANCELLED | OUTPATIENT
Start: 2023-02-09 | End: 2023-05-10

## 2023-02-09 RX ORDER — BROMPHENIRAMINE MALEATE, DEXTROMETHORPHAN HYDROBROMIDE, PHENYLEPHRINE HYDROCHLORIDE 1; 5; 2.5 MG/5ML; MG/5ML; MG/5ML
LIQUID ORAL
COMMUNITY
Start: 2022-12-03 | End: 2023-02-09

## 2023-02-09 RX ORDER — METOPROLOL SUCCINATE 50 MG/1
50 TABLET, EXTENDED RELEASE ORAL DAILY
Qty: 90 TABLET | Refills: 3 | Status: CANCELLED | OUTPATIENT
Start: 2023-02-09

## 2023-02-09 RX ORDER — LOSARTAN POTASSIUM 50 MG/1
50 TABLET ORAL NIGHTLY
Qty: 90 TABLET | Refills: 3 | Status: CANCELLED | OUTPATIENT
Start: 2023-02-09 | End: 2023-05-10

## 2023-02-09 RX ORDER — ROSUVASTATIN CALCIUM 20 MG/1
20 TABLET, COATED ORAL
COMMUNITY
Start: 2022-10-24 | End: 2023-02-09

## 2023-02-09 RX ORDER — CETIRIZINE HYDROCHLORIDE 10 MG/1
10 TABLET ORAL DAILY
Qty: 90 TABLET | Refills: 3 | Status: CANCELLED | OUTPATIENT
Start: 2023-02-09 | End: 2023-05-10

## 2023-02-09 RX ORDER — GABAPENTIN 600 MG/1
600 TABLET ORAL 3 TIMES DAILY
Qty: 90 TABLET | Refills: 1 | Status: CANCELLED | OUTPATIENT
Start: 2023-02-09

## 2023-02-09 NOTE — PROGRESS NOTES
FOLLOW UP NOTE:     CHIEF COMPLAINT: neck, back, leg, and arm pain    INTERVAL HISTORY OF PRESENT ILLNESS: Annie Baird is a 43 y.o. female with PMH significant for seizure disorder s/p vagal nerve stimulator placement, HTN, hx of anxiety on chronic benzodiazepines, hx of left shoulder arthroscopic surgery (12/4/2012), hx of right CTS release (11/22/2013), hx of multiple CVA's (residual left sided weakness per patient and daughter report), narcolepsy, insomnia, and hx of C5-C6 ACDF (5/1/2019) presents as an established patientm, new to me, for the continued management of chronic pain in multiple areas. The patient presents today for medication refill. She is currently staying in [place] near [place] for the approximately 2 weeks. The patient reports she recently missed her PATRICIA with Dr. Evans due to her mother having surgery. CC Today is worsening pain down right LE, posterior thigh to her right foot. She has benefited in the past from IESI at L5-S1 and requests a repeat. Today, the patient complains of pain in multiple locations which including her low back, mid back near her shoulder blades, neck, feet, and hands. The patient reports that activity in general worsens her pain. The patient is tolerating her current pain regimen without adverse side effects. The patient denies of any significant changes in her health since her last appointment. The patient also denies of any changes in the character of her pain since her last appointment. The patient reports that her current pain is a 9/10. Patient denies of any urinary/fecal incontinence, saddle anesthesia, or weakness.        INITIAL HISTORY OF PRESENT ILLNESS: Annie Baird is a 41 y.o. female with PMH significant for seizure disorder s/p vagal nerve stimulator placement, HTN, hx of anxiety on chronic benzodiazepines, hx of left shoulder arthroscopic surgery (12/4/2012), hx of right CTS release (11/22/2013), hx of multiple CVA's (residual  left sided weakness per patient and daughter report), narcolepsy, insomnia, and hx of C5-C6 ACDF (5/1/2019) presents as a referral for the evaluation of multiple pain complaints. The patient reports that her low back pain is the worst of her pains. The patient has had a rather extensive work-up in [place], and I have put the relevant diagnostic tests in my note below. In summary:     The patient reports that her pain began approximately 1 year ago when she experienced low back pain. The patient denies of any inciting incident or trauma. The patient reports of worsening pain over time. She reported of progressive symptoms that extended into her lower extremities. She eventually presented to the hospital for evaluation of possible cauda equina syndrome. CT myelogram results copied below. In regards to her pain: the patient localizes her pain to center of her lower back. The patient reports of radiation to her bilateral buttocks and down the posterior aspect of her RLE to her foot. The patient describes her pain as a sharp, stinging, and burning type of pain. The patient reports of numbness in her left 2nd, 3rd, and 4th digits in her hand and her 1st and 2nd toes bilaterally. The patient reports that her pain is a 6/10. Patient denies of any fecal incontinence or saddle anesthesia. The patient reports of daily episodes of urinary incontinence which has been ongoing for the past few months. The patient reports of requiring the assistance of a wheelchair for ambulation for the past few months.      Aggravating factors: activity in general     Mitigating factors: laying on her left side     Relevant Surgeries: yes; history of cervical spine surgery X 1     Interventional Therapies: yes; Dr. Chun Pat in [place]. Dr. Pat did RFA's and epidurals. No benefit.       INTERVENTIONAL PAIN HISTORY:  7/12/2021:  Lumbar interlaminar epidural steroid injection at L5-S1 under fluoroscopic guidance (left paramedian approach) -  improvement for a few days    CURRENT PAIN MEDICATIONS:   gabapentin 600/600/900 mg PO TID  Norco 7.5- 325 mg PO q 6 hr  zofran 4 mg PO q 8 hr     Previously taking:   Ativan (once or twice a month)  temazepam 30 mg PO QHS (insomnia)        ROS:  Review of Systems   Constitutional:  Negative for chills and fever.   HENT:  Negative for sore throat.    Eyes:  Negative for visual disturbance.   Respiratory:  Negative for shortness of breath.    Cardiovascular:  Negative for chest pain.   Gastrointestinal:  Negative for nausea and vomiting.   Genitourinary:  Negative for difficulty urinating.   Musculoskeletal:  Positive for arthralgias, back pain, gait problem, myalgias and neck pain.   Skin:  Negative for rash.   Allergic/Immunologic: Negative for immunocompromised state.   Neurological:  Positive for seizures, weakness and numbness. Negative for syncope.   Hematological:  Does not bruise/bleed easily.   Psychiatric/Behavioral:  Negative for suicidal ideas.    All other systems reviewed and are negative.     MEDICAL, SURGICAL, FAMILY, SOCIAL HX: reviewed    MEDICATIONS/ALLERGIES: reviewed    PHYSICAL EXAM:    GENERAL: A and O x3, the patient appears well groomed and is in no acute distress.  Skin: No rashes or obvious lesions  HEENT: normocephalic, atraumatic  LUNGS: non labored breathing  ABDOMEN: non distended  UPPER EXTREMITIES and lower extremities. Normal ROM  CERVICAL SPINE:  Cervical spine: ROM is full in flexion, extension and lateral rotation without increased pain.    LUMBAR SPINE  Lumbar spine: ROM is limited with flexion extension and oblique extension with mild-to-moderate increased pain.      MENTAL STATUS: normal orientation, speech, language, and fund of knowledge for social situation.  Emotional state appropriate.    GAIT: normal rise, base, steps, and arm swing.      GAIT: antalgic gait; unsteady gait    IMAGING:    X-ray cervical spine (8/3/2021):  There is no detectable change in the appearance of  the cervical spine compared to the prior study.  There are postsurgical changes of ACDF at the C5-6 level.  Hardware is intact.  Interbody fusion device is unchanged in appearance.  There is mild endplate osteophyte formation at the C4-5 level.  Vertebral body height and alignment are normal.  There is no fracture.  The odontoid process is intact.  The patient has a vagal stimulator.  Otherwise, the soft tissues are normal.     X-ray thoracic spine (7/23/2021):  The thoracic vertebral bodies maintain normal height and alignment without significant disc space narrowing. The posterior elements are intact. There is mild osteophyte formation at the T11-12 and T12-L1 levels. The paraspinous soft tissues are normal.    ASSESSMENT: Annie Baird is a 41 y.o. female with PMH significant for seizure disorder s/p vagal nerve stimulator placement, HTN, hx of anxiety on chronic benzodiazepines, hx of left shoulder arthroscopic surgery (12/4/2012), hx of right CTS release (11/22/2013), hx of multiple CVA's (residual left sided weakness per patient and daughter report), narcolepsy, insomnia, and hx of C5-C6 ACDF (5/1/2019) presents as an established patient,  for the continued management of chronic pain in multiple areas. The patient presents today for medication refill.Treatment plan outlined below.   Encounter Diagnoses   Name Primary?    Lumbar radiculopathy     DDD (degenerative disc disease), lumbar Yes    Sacroiliac joint pain     Chronic pain disorder     DDD (degenerative disc disease), cervical       PLAN:  1. Prescribed Norco 7.5-325 mg PO q 6 hr PRN for breakthrough pain; # 120 tablets; 0 refills.  reviewed without discrepancies. RX sent to [facility] in [place] per patient's request due to her currently living there.   2. Continue gabapentin as prescribed for neuropathic pain.  3. Schedule for repeat Interlaminar Cervical PATRICIA at L5- S1   4. RTC for the procedure as outlined above   5. Prior UDS  consistent, UDS to be collected at f/u.   All medication management performed by Dr. Nathan Leone, NP

## 2023-02-09 NOTE — TELEPHONE ENCOUNTER
No new care gaps identified.  [facility] Embedded Care Gaps. Reference number: [id number]. 2/09/2023   1:27:10 PM CST

## 2023-02-10 ENCOUNTER — PATIENT MESSAGE (OUTPATIENT)
Dept: ADMINISTRATIVE | Facility: HOSPITAL | Age: 43
End: 2023-02-10
Payer: COMMERCIAL

## 2023-02-10 ENCOUNTER — PATIENT MESSAGE (OUTPATIENT)
Dept: ADMINISTRATIVE | Facility: OTHER | Age: 43
End: 2023-02-10
Payer: COMMERCIAL

## 2023-02-10 DIAGNOSIS — Z12.11 SCREENING FOR COLON CANCER: ICD-10-CM

## 2023-02-17 RX ORDER — LAMOTRIGINE 150 MG/1
300 TABLET ORAL 2 TIMES DAILY
Qty: 360 TABLET | Refills: 1 | OUTPATIENT
Start: 2023-02-17

## 2023-02-17 RX ORDER — LEVETIRACETAM 750 MG/1
1500 TABLET ORAL 2 TIMES DAILY
Qty: 360 TABLET | Refills: 1 | OUTPATIENT
Start: 2023-02-17

## 2023-02-17 RX ORDER — LAMOTRIGINE 25 MG/1
25 TABLET ORAL 2 TIMES DAILY
Qty: 180 TABLET | Refills: 1 | OUTPATIENT
Start: 2023-02-17

## 2023-02-20 ENCOUNTER — PATIENT MESSAGE (OUTPATIENT)
Dept: FAMILY MEDICINE | Facility: CLINIC | Age: 43
End: 2023-02-20
Payer: COMMERCIAL

## 2023-02-20 NOTE — TELEPHONE ENCOUNTER
Pt is requesting a refill on her Lamictal due to running low. Pt was unable to est care with a neurologist due to moving a lot. Pt has an appt on 2/28 with a neurologist and is going to request a referral for one closer to her home. Please advise on refill. Current lamictal dose pended and correct pharmacy pended.    LOV 12/01/22  LAB 09/14/22  Next OV none

## 2023-02-21 RX ORDER — LAMOTRIGINE 150 MG/1
300 TABLET ORAL 2 TIMES DAILY
Qty: 360 TABLET | Refills: 0 | Status: SHIPPED | OUTPATIENT
Start: 2023-02-21 | End: 2023-03-24

## 2023-02-21 RX ORDER — LAMOTRIGINE 25 MG/1
25 TABLET ORAL 2 TIMES DAILY
Qty: 180 TABLET | Refills: 0 | Status: SHIPPED | OUTPATIENT
Start: 2023-02-21 | End: 2023-03-24

## 2023-02-24 ENCOUNTER — PATIENT MESSAGE (OUTPATIENT)
Dept: FAMILY MEDICINE | Facility: CLINIC | Age: 43
End: 2023-02-24
Payer: COMMERCIAL

## 2023-02-24 DIAGNOSIS — M54.2 NECK PAIN: Primary | ICD-10-CM

## 2023-02-27 ENCOUNTER — PATIENT MESSAGE (OUTPATIENT)
Dept: FAMILY MEDICINE | Facility: CLINIC | Age: 43
End: 2023-02-27
Payer: COMMERCIAL

## 2023-02-28 ENCOUNTER — TELEPHONE (OUTPATIENT)
Dept: NEUROSURGERY | Facility: CLINIC | Age: 43
End: 2023-02-28
Payer: COMMERCIAL

## 2023-02-28 ENCOUNTER — OFFICE VISIT (OUTPATIENT)
Dept: NEUROSURGERY | Facility: CLINIC | Age: 43
End: 2023-02-28
Payer: COMMERCIAL

## 2023-02-28 DIAGNOSIS — G40.909 SEIZURE DISORDER: ICD-10-CM

## 2023-02-28 DIAGNOSIS — Z96.89 S/P PLACEMENT OF VNS (VAGUS NERVE STIMULATION) DEVICE: Primary | ICD-10-CM

## 2023-02-28 PROCEDURE — 4010F PR ACE/ARB THEARPY RXD/TAKEN: ICD-10-PCS | Mod: CPTII,95,, | Performed by: PHYSICIAN ASSISTANT

## 2023-02-28 PROCEDURE — 99213 OFFICE O/P EST LOW 20 MIN: CPT | Mod: 95,,, | Performed by: PHYSICIAN ASSISTANT

## 2023-02-28 PROCEDURE — 4010F ACE/ARB THERAPY RXD/TAKEN: CPT | Mod: CPTII,95,, | Performed by: PHYSICIAN ASSISTANT

## 2023-02-28 PROCEDURE — 99213 PR OFFICE/OUTPT VISIT, EST, LEVL III, 20-29 MIN: ICD-10-PCS | Mod: 95,,, | Performed by: PHYSICIAN ASSISTANT

## 2023-03-01 ENCOUNTER — TELEPHONE (OUTPATIENT)
Dept: FAMILY MEDICINE | Facility: CLINIC | Age: 43
End: 2023-03-01
Payer: COMMERCIAL

## 2023-03-01 NOTE — TELEPHONE ENCOUNTER
----- Message from Annie Leahyry sent at 3/1/2023  9:29 AM CST -----  .Type:  Pharmacy Calling to Clarify an RX    Pharmacy Name: [facility]     Prescription Name: LAMICTAL 150 MG 25 MG     What do they need to clarify?: IT EXCEEDS THE DAILY MAX DOSE THE PHARMACY WILL NEED TO SPEAK WITH THE OFFICE BEFORE IT'S FILLED     Best Call Back Number: [phone]     Additional Information: NONE

## 2023-03-02 ENCOUNTER — HOSPITAL ENCOUNTER (OUTPATIENT)
Dept: RADIOLOGY | Facility: HOSPITAL | Age: 43
Discharge: HOME | End: 2023-03-02
Attending: STUDENT IN AN ORGANIZED HEALTH CARE EDUCATION/TRAINING PROGRAM
Payer: COMMERCIAL

## 2023-03-02 ENCOUNTER — PATIENT MESSAGE (OUTPATIENT)
Dept: FAMILY MEDICINE | Facility: CLINIC | Age: 43
End: 2023-03-02
Payer: COMMERCIAL

## 2023-03-02 DIAGNOSIS — M25.512 CHRONIC LEFT SHOULDER PAIN: ICD-10-CM

## 2023-03-02 DIAGNOSIS — M54.2 NECK PAIN: ICD-10-CM

## 2023-03-02 DIAGNOSIS — M25.519 SHOULDER PAIN, UNSPECIFIED CHRONICITY, UNSPECIFIED LATERALITY: Primary | ICD-10-CM

## 2023-03-02 DIAGNOSIS — G89.29 CHRONIC LEFT SHOULDER PAIN: ICD-10-CM

## 2023-03-02 PROCEDURE — 72050 X-RAY EXAM NECK SPINE 4/5VWS: CPT | Mod: TC

## 2023-03-02 PROCEDURE — 73030 XR SHOULDER TRAUMA 3 VIEW LEFT: ICD-10-PCS | Mod: 26,LT,, | Performed by: RADIOLOGY

## 2023-03-02 PROCEDURE — 72050 XR CERVICAL SPINE COMPLETE 5 VIEW: ICD-10-PCS | Mod: 26,,, | Performed by: RADIOLOGY

## 2023-03-02 PROCEDURE — 73030 X-RAY EXAM OF SHOULDER: CPT | Mod: TC,LT

## 2023-03-02 PROCEDURE — 73030 X-RAY EXAM OF SHOULDER: CPT | Mod: 26,LT,, | Performed by: RADIOLOGY

## 2023-03-02 PROCEDURE — 72050 X-RAY EXAM NECK SPINE 4/5VWS: CPT | Mod: 26,,, | Performed by: RADIOLOGY

## 2023-03-03 ENCOUNTER — PATIENT MESSAGE (OUTPATIENT)
Dept: FAMILY MEDICINE | Facility: CLINIC | Age: 43
End: 2023-03-03
Payer: COMMERCIAL

## 2023-03-03 DIAGNOSIS — G40.909 SEIZURE DISORDER: Primary | ICD-10-CM

## 2023-03-03 DIAGNOSIS — M25.50 ARTHRALGIA, UNSPECIFIED JOINT: ICD-10-CM

## 2023-03-06 NOTE — PROGRESS NOTES
Neurosurgery  Established Patient    SUBJECTIVE:     History of Present Illness:  42 yo female s/p VNS placement for intractable epilepsy who presents with complaint of pulling sensation at the neck. She had VNS revision 09/2022 due to significant pain when turning her neck concerning for tethering. Post-op she did well and her incision is well healed without surrounding redness. She reports she started noticing this sensation 2 months ago. It is not worsening and she reports it is not painful. Her seizures remain controlled, however she is still having some breakthrough seizures which is not new for her. She was like another referral to an Epileptologist closer to home.     Review of patient's allergies indicates:   Allergen Reactions    Depakote [divalproex] Hives    Dilantin [phenytoin sodium extended] Hives       Current Outpatient Medications   Medication Sig Dispense Refill    acetaminophen (TYLENOL) 325 MG tablet Take 325 mg by mouth every 6 (six) hours as needed for Pain.      cetirizine (ZYRTEC) 10 MG tablet Take 1 tablet (10 mg total) by mouth once daily. 90 tablet 3    gabapentin (NEURONTIN) 600 MG tablet TAKE 1 TABLET THREE TIMES DAILY 90 tablet 1    lamoTRIgine (LAMICTAL) 150 MG Tab Take 2 tablets (300 mg total) by mouth 2 (two) times daily. Take with 25 mg tablets for dose of 325 mg  tablet 0    lamoTRIgine (LAMICTAL) 25 MG tablet Take 1 tablet (25 mg total) by mouth 2 (two) times daily. 180 tablet 0    levETIRAcetam (KEPPRA) 750 MG Tab Take 2 tablets (1,500 mg total) by mouth 2 (two) times daily. 360 tablet 1    losartan (COZAAR) 50 MG tablet Take 1 tablet (50 mg total) by mouth every evening. 90 tablet 3    metoprolol succinate (TOPROL-XL) 50 MG 24 hr tablet Take 1 tablet (50 mg total) by mouth once daily. 90 tablet 3    montelukast (SINGULAIR) 10 mg tablet Take 1 tablet (10 mg total) by mouth every evening. 90 tablet 3    multivitamin with minerals tablet Take 1 tablet by mouth once daily.       naproxen (NAPROSYN) 500 MG tablet Take 500 mg by mouth 2 (two) times daily as needed.      nortriptyline (PAMELOR) 25 MG capsule Take 25 mg by mouth every evening.      omeprazole (PRILOSEC) 40 MG capsule Take 1 capsule (40 mg total) by mouth every morning. 90 capsule 3    ondansetron (ZOFRAN) 4 MG tablet Take 8 mg by mouth every 8 (eight) hours as needed.       oxyCODONE-acetaminophen (PERCOCET) 5-325 mg per tablet Take 1 tablet by mouth every 6 (six) hours as needed for Pain. 120 tablet 0    potassium gluconate 595 mg (99 mg) Tab Take 1 tablet by mouth once daily.      TENS UNITS MISC by Misc.(Non-Drug; Combo Route) route.      varenicline (CHANTIX) 1 mg Tab Take 1 tablet (1 mg total) by mouth 2 (two) times daily. 180 tablet 0    vitamin D (VITAMIN D3) 1000 units Tab Take 1,000 Units by mouth once daily.       No current facility-administered medications for this visit.       Past Medical History:   Diagnosis Date    Asthma     GERD (gastroesophageal reflux disease)     Hypertension     Seizures     Traumatic tear of left rotator cuff      Past Surgical History:   Procedure Laterality Date    APPENDECTOMY      BILATERAL TUBAL LIGATION       SECTION      EPIDURAL STEROID INJECTION INTO LUMBAR SPINE N/A 2021    Procedure: Injection-steroid-epidural-lumbar;  Surgeon: Yolie Lebron MD;  Location: [facility] OR;  Service: Pain Management;  Laterality: N/A;  L5-S1     INJECTION OF JOINT Left 2022    Procedure: Injection, Joint Sacroiliac Left and GTB;  Surgeon: Yolie Lebron MD;  Location: [facility] OR;  Service: Pain Management;  Laterality: Left;    JOINT REPLACEMENT      REPLACEMENT OF BATTERY OF VAGUS NERVE STIMULATOR Left 2022    Procedure: REPLACEMENT, BATTERY, NEUROSTIMULATOR, VAGAL- revision;  Surgeon: Berto Corona MD;  Location: [facility] OR [address] [place] [address];  Service: Neurosurgery;  Laterality: Left;    VAGAL NERVE STIMULATOR placement Left 2021     Family History       Problem Relation (Age of  Onset)    COPD Mother, Father    Cancer Father    Heart disease Mother    Hyperlipidemia Mother    Hypertension Mother    Stroke Mother          Social History     Socioeconomic History    Marital status: Legally     Number of children: 2   Tobacco Use    Smoking status: Every Day     Packs/day: 1.50     Years: 20.00     Pack years: 30.00     Types: Vaping with nicotine, Cigarettes    Smokeless tobacco: Never    Tobacco comments:     started at age 22 daily use   Substance and Sexual Activity    Alcohol use: Not Currently    Drug use: Never    Sexual activity: Yes     Partners: Male     Birth control/protection: None     Social Determinants of Health     Financial Resource Strain: Unknown    Difficulty of Paying Living Expenses: Patient refused   Food Insecurity: Unknown    Worried About Running Out of Food in the Last Year: Patient refused    Ran Out of Food in the Last Year: Patient refused   Transportation Needs: Unknown    Lack of Transportation (Medical): Patient refused    Lack of Transportation (Non-Medical): Patient refused   Physical Activity: Insufficiently Active    Days of Exercise per Week: 4 days    Minutes of Exercise per Session: 20 min   Stress: Stress Concern Present    Feeling of Stress : Very much   Social Connections: Unknown    Frequency of Communication with Friends and Family: More than three times a week    Frequency of Social Gatherings with Friends and Family: Once a week    Attends Orthodoxy Services: Never    Active Member of Clubs or Organizations: No    Attends Club or Organization Meetings: Never    Marital Status: Patient refused   Housing Stability: Unknown    Unable to Pay for Housing in the Last Year: Patient refused    Number of Places Lived in the Last Year: 2    Unstable Housing in the Last Year: No       Review of Systems    OBJECTIVE:     Vital Signs     There is no height or weight on file to calculate BMI.    Neurosurgery Physical Exam  General: well developed,  "well nourished, no distress.   Head: normocephalic, atraumatic  Neurologic: Alert and oriented. Thought content appropriate.  GCS: Motor: 6/Verbal: 5/Eyes: 4 GCS Total: 15  Mental Status: Awake, Alert, Oriented x3  Cranial nerves: face symmetric, tongue midline, CN II-XII grossly intact.   Eyes: EOMI.   Motor Strength:Moves all extremities spontaneously with good tone.  Full strength upper and lower extremities. No abnormal movements seen.   Incision appears well healed    Diagnostic Results:  none    ASSESSMENT/PLAN:     42 yo female s/p VNS revision 5 months ago who reports a "pulling" sensation at the incision site. This is likely related to post-operative scar tissue. She denies any change in her seizure frequency and states this is not painful. I do not think we need to explore at this point, however I will continue to follow her symptoms in 6 months. I will also refer her to neurology closer to home per her request.    All symptoms and signs that require emergent or urgent treatment were reviewed. The plan was discussed with the patient. All of the the patient's I encouraged her to call us with any questions or concerns prior to follow up.      Heather Wise PA-C  Neurosurgery        Note dictated with voice recognition software, please excuse any grammatical errors.      "

## 2023-03-06 NOTE — H&P (VIEW-ONLY)
Neurosurgery  Established Patient    SUBJECTIVE:     History of Present Illness:  44 yo female s/p VNS placement for intractable epilepsy who presents with complaint of pulling sensation at the neck. She had VNS revision 09/2022 due to significant pain when turning her neck concerning for tethering. Post-op she did well and her incision is well healed without surrounding redness. She reports she started noticing this sensation 2 months ago. It is not worsening and she reports it is not painful. Her seizures remain controlled, however she is still having some breakthrough seizures which is not new for her. She was like another referral to an Epileptologist closer to home.     Review of patient's allergies indicates:   Allergen Reactions    Depakote [divalproex] Hives    Dilantin [phenytoin sodium extended] Hives       Current Outpatient Medications   Medication Sig Dispense Refill    acetaminophen (TYLENOL) 325 MG tablet Take 325 mg by mouth every 6 (six) hours as needed for Pain.      cetirizine (ZYRTEC) 10 MG tablet Take 1 tablet (10 mg total) by mouth once daily. 90 tablet 3    gabapentin (NEURONTIN) 600 MG tablet TAKE 1 TABLET THREE TIMES DAILY 90 tablet 1    lamoTRIgine (LAMICTAL) 150 MG Tab Take 2 tablets (300 mg total) by mouth 2 (two) times daily. Take with 25 mg tablets for dose of 325 mg  tablet 0    lamoTRIgine (LAMICTAL) 25 MG tablet Take 1 tablet (25 mg total) by mouth 2 (two) times daily. 180 tablet 0    levETIRAcetam (KEPPRA) 750 MG Tab Take 2 tablets (1,500 mg total) by mouth 2 (two) times daily. 360 tablet 1    losartan (COZAAR) 50 MG tablet Take 1 tablet (50 mg total) by mouth every evening. 90 tablet 3    metoprolol succinate (TOPROL-XL) 50 MG 24 hr tablet Take 1 tablet (50 mg total) by mouth once daily. 90 tablet 3    montelukast (SINGULAIR) 10 mg tablet Take 1 tablet (10 mg total) by mouth every evening. 90 tablet 3    multivitamin with minerals tablet Take 1 tablet by mouth once daily.       naproxen (NAPROSYN) 500 MG tablet Take 500 mg by mouth 2 (two) times daily as needed.      nortriptyline (PAMELOR) 25 MG capsule Take 25 mg by mouth every evening.      omeprazole (PRILOSEC) 40 MG capsule Take 1 capsule (40 mg total) by mouth every morning. 90 capsule 3    ondansetron (ZOFRAN) 4 MG tablet Take 8 mg by mouth every 8 (eight) hours as needed.       oxyCODONE-acetaminophen (PERCOCET) 5-325 mg per tablet Take 1 tablet by mouth every 6 (six) hours as needed for Pain. 120 tablet 0    potassium gluconate 595 mg (99 mg) Tab Take 1 tablet by mouth once daily.      TENS UNITS MISC by Misc.(Non-Drug; Combo Route) route.      varenicline (CHANTIX) 1 mg Tab Take 1 tablet (1 mg total) by mouth 2 (two) times daily. 180 tablet 0    vitamin D (VITAMIN D3) 1000 units Tab Take 1,000 Units by mouth once daily.       No current facility-administered medications for this visit.       Past Medical History:   Diagnosis Date    Asthma     GERD (gastroesophageal reflux disease)     Hypertension     Seizures     Traumatic tear of left rotator cuff      Past Surgical History:   Procedure Laterality Date    APPENDECTOMY      BILATERAL TUBAL LIGATION       SECTION      EPIDURAL STEROID INJECTION INTO LUMBAR SPINE N/A 2021    Procedure: Injection-steroid-epidural-lumbar;  Surgeon: Yolie Lebron MD;  Location: [facility] OR;  Service: Pain Management;  Laterality: N/A;  L5-S1     INJECTION OF JOINT Left 2022    Procedure: Injection, Joint Sacroiliac Left and GTB;  Surgeon: Yolie Lebron MD;  Location: [facility] OR;  Service: Pain Management;  Laterality: Left;    JOINT REPLACEMENT      REPLACEMENT OF BATTERY OF VAGUS NERVE STIMULATOR Left 2022    Procedure: REPLACEMENT, BATTERY, NEUROSTIMULATOR, VAGAL- revision;  Surgeon: Berto Corona MD;  Location: [facility] OR [address] [place] [address];  Service: Neurosurgery;  Laterality: Left;    VAGAL NERVE STIMULATOR placement Left 2021     Family History       Problem Relation (Age of  Onset)    COPD Mother, Father    Cancer Father    Heart disease Mother    Hyperlipidemia Mother    Hypertension Mother    Stroke Mother          Social History     Socioeconomic History    Marital status: Legally     Number of children: 2   Tobacco Use    Smoking status: Every Day     Packs/day: 1.50     Years: 20.00     Pack years: 30.00     Types: Vaping with nicotine, Cigarettes    Smokeless tobacco: Never    Tobacco comments:     started at age 22 daily use   Substance and Sexual Activity    Alcohol use: Not Currently    Drug use: Never    Sexual activity: Yes     Partners: Male     Birth control/protection: None     Social Determinants of Health     Financial Resource Strain: Unknown    Difficulty of Paying Living Expenses: Patient refused   Food Insecurity: Unknown    Worried About Running Out of Food in the Last Year: Patient refused    Ran Out of Food in the Last Year: Patient refused   Transportation Needs: Unknown    Lack of Transportation (Medical): Patient refused    Lack of Transportation (Non-Medical): Patient refused   Physical Activity: Insufficiently Active    Days of Exercise per Week: 4 days    Minutes of Exercise per Session: 20 min   Stress: Stress Concern Present    Feeling of Stress : Very much   Social Connections: Unknown    Frequency of Communication with Friends and Family: More than three times a week    Frequency of Social Gatherings with Friends and Family: Once a week    Attends Holiness Services: Never    Active Member of Clubs or Organizations: No    Attends Club or Organization Meetings: Never    Marital Status: Patient refused   Housing Stability: Unknown    Unable to Pay for Housing in the Last Year: Patient refused    Number of Places Lived in the Last Year: 2    Unstable Housing in the Last Year: No       Review of Systems    OBJECTIVE:     Vital Signs     There is no height or weight on file to calculate BMI.    Neurosurgery Physical Exam  General: well developed,  "well nourished, no distress.   Head: normocephalic, atraumatic  Neurologic: Alert and oriented. Thought content appropriate.  GCS: Motor: 6/Verbal: 5/Eyes: 4 GCS Total: 15  Mental Status: Awake, Alert, Oriented x3  Cranial nerves: face symmetric, tongue midline, CN II-XII grossly intact.   Eyes: EOMI.   Motor Strength:Moves all extremities spontaneously with good tone.  Full strength upper and lower extremities. No abnormal movements seen.   Incision appears well healed    Diagnostic Results:  none    ASSESSMENT/PLAN:     44 yo female s/p VNS revision 5 months ago who reports a "pulling" sensation at the incision site. This is likely related to post-operative scar tissue. She denies any change in her seizure frequency and states this is not painful. I do not think we need to explore at this point, however I will continue to follow her symptoms in 6 months. I will also refer her to neurology closer to home per her request.    All symptoms and signs that require emergent or urgent treatment were reviewed. The plan was discussed with the patient. All of the the patient's I encouraged her to call us with any questions or concerns prior to follow up.      Heather Wise PA-C  Neurosurgery        Note dictated with voice recognition software, please excuse any grammatical errors.      "

## 2023-03-07 ENCOUNTER — PATIENT MESSAGE (OUTPATIENT)
Dept: FAMILY MEDICINE | Facility: CLINIC | Age: 43
End: 2023-03-07
Payer: COMMERCIAL

## 2023-03-13 ENCOUNTER — PATIENT MESSAGE (OUTPATIENT)
Dept: FAMILY MEDICINE | Facility: CLINIC | Age: 43
End: 2023-03-13
Payer: COMMERCIAL

## 2023-03-13 DIAGNOSIS — R32 URINARY INCONTINENCE, UNSPECIFIED TYPE: ICD-10-CM

## 2023-03-13 DIAGNOSIS — M25.50 ARTHRALGIA, UNSPECIFIED JOINT: Primary | ICD-10-CM

## 2023-03-16 ENCOUNTER — HOSPITAL ENCOUNTER (OUTPATIENT)
Facility: AMBULARY SURGERY CENTER | Age: 43
Discharge: HOME | End: 2023-03-16
Attending: ANESTHESIOLOGY | Admitting: ANESTHESIOLOGY
Payer: COMMERCIAL

## 2023-03-16 VITALS
RESPIRATION RATE: 16 BRPM | HEIGHT: 63 IN | WEIGHT: 238.13 LBS | DIASTOLIC BLOOD PRESSURE: 92 MMHG | SYSTOLIC BLOOD PRESSURE: 120 MMHG | BODY MASS INDEX: 42.19 KG/M2 | HEART RATE: 83 BPM | OXYGEN SATURATION: 96 % | TEMPERATURE: 97 F

## 2023-03-16 DIAGNOSIS — M54.16 LUMBAR RADICULITIS: Primary | ICD-10-CM

## 2023-03-16 LAB
B-HCG UR QL: NEGATIVE
CTP QC/QA: YES

## 2023-03-16 PROCEDURE — 62323 PR INJ LUMBAR/SACRAL, W/IMAGING GUIDANCE: ICD-10-PCS | Mod: ,,, | Performed by: ANESTHESIOLOGY

## 2023-03-16 PROCEDURE — 62323 NJX INTERLAMINAR LMBR/SAC: CPT | Mod: ,,, | Performed by: ANESTHESIOLOGY

## 2023-03-16 PROCEDURE — 62323 NJX INTERLAMINAR LMBR/SAC: CPT | Performed by: ANESTHESIOLOGY

## 2023-03-16 RX ORDER — DEXAMETHASONE SODIUM PHOSPHATE 10 MG/ML
INJECTION INTRAMUSCULAR; INTRAVENOUS
Status: DISCONTINUED | OUTPATIENT
Start: 2023-03-16 | End: 2023-03-16 | Stop reason: HOSPADM

## 2023-03-16 RX ORDER — MIDAZOLAM HYDROCHLORIDE 1 MG/ML
INJECTION, SOLUTION INTRAMUSCULAR; INTRAVENOUS
Status: DISCONTINUED | OUTPATIENT
Start: 2023-03-16 | End: 2023-03-16 | Stop reason: HOSPADM

## 2023-03-16 RX ORDER — SODIUM CHLORIDE 0.9 % (FLUSH) 0.9 %
SYRINGE (ML) INJECTION
Status: DISCONTINUED | OUTPATIENT
Start: 2023-03-16 | End: 2023-03-16 | Stop reason: HOSPADM

## 2023-03-16 RX ORDER — FENTANYL CITRATE 50 UG/ML
INJECTION, SOLUTION INTRAMUSCULAR; INTRAVENOUS
Status: DISCONTINUED | OUTPATIENT
Start: 2023-03-16 | End: 2023-03-16 | Stop reason: HOSPADM

## 2023-03-16 RX ORDER — SODIUM CHLORIDE, SODIUM LACTATE, POTASSIUM CHLORIDE, CALCIUM CHLORIDE 600; 310; 30; 20 MG/100ML; MG/100ML; MG/100ML; MG/100ML
INJECTION, SOLUTION INTRAVENOUS ONCE AS NEEDED
Status: COMPLETED | OUTPATIENT
Start: 2023-03-16 | End: 2023-03-16

## 2023-03-16 RX ORDER — LIDOCAINE HYDROCHLORIDE 10 MG/ML
INJECTION, SOLUTION EPIDURAL; INFILTRATION; INTRACAUDAL; PERINEURAL
Status: DISCONTINUED | OUTPATIENT
Start: 2023-03-16 | End: 2023-03-16 | Stop reason: HOSPADM

## 2023-03-16 RX ADMIN — SODIUM CHLORIDE, SODIUM LACTATE, POTASSIUM CHLORIDE, CALCIUM CHLORIDE: 600; 310; 30; 20 INJECTION, SOLUTION INTRAVENOUS at 01:03

## 2023-03-16 NOTE — DISCHARGE SUMMARY
[facility]  Discharge Note  Short Stay    Procedure(s) (LRB):  Injection, Steroid, Epidural L5-S1 (N/A)      OUTCOME: Patient tolerated treatment/procedure well without complication and is now ready for discharge.    DISPOSITION: Home or Self Care    FINAL DIAGNOSIS:  <principal problem not specified>    FOLLOWUP: In clinic    DISCHARGE INSTRUCTIONS:    Discharge Procedure Orders   Notify your health care provider if you experience any of the following:  temperature >100.4     Notify your health care provider if you experience any of the following:  severe uncontrolled pain     Notify your health care provider if you experience any of the following:  redness, tenderness, or signs of infection (pain, swelling, redness, odor or green/yellow discharge around incision site)     Activity as tolerated        TIME SPENT ON DISCHARGE: 30 minutes

## 2023-03-16 NOTE — OP NOTE
PROCEDURE DATE: 3/16/2023    Procedure:   Interlaminar epidural steroid injection at L5-S1 under fluoroscopic guidance.    Diagnosis: lUMBAR radiculitis  pOSTOP DIAGNOSIS: sAME    Physician: Jerman Evans M.D.    Medications injected:10 mg dexamethasone with 4 ml of preservative free NaCl    Local anesthetic injected:    Lidocaine 1% 2 ml total    Sedation Medications: RN IV sedation    Estimated blood loss:  None    Complications:  None    Technique:  Time-out taken to identify patient and procedure prior to starting the procedure.  With the patient laying in a prone position, the area was prepped and draped in the usual sterile fashion using ChloraPrep and a fenestrated drape.  After determining the target level with an AP fluoroscopic view, local anesthetic was given using a 25-gauge 1.5 inch needle by raising a wheal and then infiltrating toward the interlaminar entry space.  A 4.5inch 20-gauge Touhy needle was introduced under AP fluoroscopic guidance to the interlaminar space of L5-S1. Once the trajectory was established, the needle was visualized in the lateral view and advanced using loss of resistance technique. Once in the desired position, 1ml contrast was injected to confirm placement and there was no vascular uptake nor intrathecal spread.  The medication was then injected slowly. The patient tolerated the procedure well.      The patient was monitored after the procedure.   They were given post-procedure and discharge instructions to follow at home.  The patient was discharged in a stable condition.

## 2023-03-20 ENCOUNTER — TELEPHONE (OUTPATIENT)
Dept: ORTHOPEDICS | Facility: CLINIC | Age: 43
End: 2023-03-20
Payer: COMMERCIAL

## 2023-03-20 NOTE — TELEPHONE ENCOUNTER
Spoke with pt that her appointment 3/20/23 with RR will be cancel. Pt need to see a surgeon for her shoulder/ pt is now schedule with the correct provider Dr. Cochran 4/18/23 @ 1793. Patient states verbal understanding and has no further questions.

## 2023-03-22 ENCOUNTER — OFFICE VISIT (OUTPATIENT)
Dept: UROLOGY | Facility: CLINIC | Age: 43
End: 2023-03-22
Payer: COMMERCIAL

## 2023-03-22 VITALS
HEART RATE: 86 BPM | HEIGHT: 63 IN | BODY MASS INDEX: 46.09 KG/M2 | DIASTOLIC BLOOD PRESSURE: 86 MMHG | WEIGHT: 260.13 LBS | SYSTOLIC BLOOD PRESSURE: 133 MMHG

## 2023-03-22 DIAGNOSIS — Z87.442 HISTORY OF KIDNEY STONES: Primary | ICD-10-CM

## 2023-03-22 DIAGNOSIS — R35.1 NOCTURIA: ICD-10-CM

## 2023-03-22 DIAGNOSIS — N39.46 MIXED INCONTINENCE: ICD-10-CM

## 2023-03-22 DIAGNOSIS — R35.0 URINARY FREQUENCY: ICD-10-CM

## 2023-03-22 LAB
BILIRUBIN, UA POC OHS: NEGATIVE
BLOOD, UA POC OHS: ABNORMAL
CLARITY, UA POC OHS: CLEAR
COLOR, UA POC OHS: YELLOW
GLUCOSE, UA POC OHS: NEGATIVE
KETONES, UA POC OHS: NEGATIVE
LEUKOCYTES, UA POC OHS: NEGATIVE
NITRITE, UA POC OHS: NEGATIVE
PH, UA POC OHS: 5.5
POC RESIDUAL URINE VOLUME: 0 ML (ref 0–100)
PROTEIN, UA POC OHS: NEGATIVE
SPECIFIC GRAVITY, UA POC OHS: 1.02
UROBILINOGEN, UA POC OHS: 0.2

## 2023-03-22 PROCEDURE — 3008F BODY MASS INDEX DOCD: CPT | Mod: CPTII,S$GLB,, | Performed by: NURSE PRACTITIONER

## 2023-03-22 PROCEDURE — 4010F ACE/ARB THERAPY RXD/TAKEN: CPT | Mod: CPTII,S$GLB,, | Performed by: NURSE PRACTITIONER

## 2023-03-22 PROCEDURE — 1159F PR MEDICATION LIST DOCUMENTED IN MEDICAL RECORD: ICD-10-PCS | Mod: CPTII,S$GLB,, | Performed by: NURSE PRACTITIONER

## 2023-03-22 PROCEDURE — 81003 URINALYSIS AUTO W/O SCOPE: CPT | Mod: QW,S$GLB,, | Performed by: NURSE PRACTITIONER

## 2023-03-22 PROCEDURE — 3008F PR BODY MASS INDEX (BMI) DOCUMENTED: ICD-10-PCS | Mod: CPTII,S$GLB,, | Performed by: NURSE PRACTITIONER

## 2023-03-22 PROCEDURE — 99204 PR OFFICE/OUTPT VISIT, NEW, LEVL IV, 45-59 MIN: ICD-10-PCS | Mod: S$GLB,,, | Performed by: NURSE PRACTITIONER

## 2023-03-22 PROCEDURE — 51798 US URINE CAPACITY MEASURE: CPT | Mod: S$GLB,,, | Performed by: NURSE PRACTITIONER

## 2023-03-22 PROCEDURE — 1160F PR REVIEW ALL MEDS BY PRESCRIBER/CLIN PHARMACIST DOCUMENTED: ICD-10-PCS | Mod: CPTII,S$GLB,, | Performed by: NURSE PRACTITIONER

## 2023-03-22 PROCEDURE — 99204 OFFICE O/P NEW MOD 45 MIN: CPT | Mod: S$GLB,,, | Performed by: NURSE PRACTITIONER

## 2023-03-22 PROCEDURE — 3079F DIAST BP 80-89 MM HG: CPT | Mod: CPTII,S$GLB,, | Performed by: NURSE PRACTITIONER

## 2023-03-22 PROCEDURE — 81003 POCT URINALYSIS(INSTRUMENT): ICD-10-PCS | Mod: QW,S$GLB,, | Performed by: NURSE PRACTITIONER

## 2023-03-22 PROCEDURE — 51798 POCT BLADDER SCAN: ICD-10-PCS | Mod: S$GLB,,, | Performed by: NURSE PRACTITIONER

## 2023-03-22 PROCEDURE — 99999 PR PBB SHADOW E&M-EST. PATIENT-LVL V: CPT | Mod: PBBFAC,,, | Performed by: NURSE PRACTITIONER

## 2023-03-22 PROCEDURE — 3079F PR MOST RECENT DIASTOLIC BLOOD PRESSURE 80-89 MM HG: ICD-10-PCS | Mod: CPTII,S$GLB,, | Performed by: NURSE PRACTITIONER

## 2023-03-22 PROCEDURE — 1159F MED LIST DOCD IN RCRD: CPT | Mod: CPTII,S$GLB,, | Performed by: NURSE PRACTITIONER

## 2023-03-22 PROCEDURE — 3075F SYST BP GE 130 - 139MM HG: CPT | Mod: CPTII,S$GLB,, | Performed by: NURSE PRACTITIONER

## 2023-03-22 PROCEDURE — 4010F PR ACE/ARB THEARPY RXD/TAKEN: ICD-10-PCS | Mod: CPTII,S$GLB,, | Performed by: NURSE PRACTITIONER

## 2023-03-22 PROCEDURE — 99999 PR PBB SHADOW E&M-EST. PATIENT-LVL V: ICD-10-PCS | Mod: PBBFAC,,, | Performed by: NURSE PRACTITIONER

## 2023-03-22 PROCEDURE — 3075F PR MOST RECENT SYSTOLIC BLOOD PRESS GE 130-139MM HG: ICD-10-PCS | Mod: CPTII,S$GLB,, | Performed by: NURSE PRACTITIONER

## 2023-03-22 PROCEDURE — 1160F RVW MEDS BY RX/DR IN RCRD: CPT | Mod: CPTII,S$GLB,, | Performed by: NURSE PRACTITIONER

## 2023-03-22 RX ORDER — SOLIFENACIN SUCCINATE 10 MG/1
10 TABLET, FILM COATED ORAL DAILY
Qty: 90 TABLET | Refills: 3 | Status: SHIPPED | OUTPATIENT
Start: 2023-03-22 | End: 2024-01-11

## 2023-03-22 NOTE — PATIENT INSTRUCTIONS
Discussed conservative measures to control urgency and frequency includin. Avoiding/minimizing bladder irritants (see below), especially in afternoon and evening hours    Discussed bladder irritants include coffee (even decaf), tea, alcohol, soda, spicy foods, acidic juices (orange, tomato), vinegar, and artificial sweeteners/sugary beverages.    2. timed voiding - empty on a schedule (approx ~2-3 hours) in spite of need to urinate, to get ahead of urge    3. dont postponing voiding - dont hold it on purpose     4. bowel regimen as distended bowel has extrinsic compressive effect on bladder.   - any or all of the following in any combination, titrate to soft daily bowel movement without pushing or straining  - colace/stool softener capsule - once to twice daily  - miralax - 1 capful daily to start, can increase to 2x daily (or decrease to 1/2 cap daily)  - increase dietary fibery  - fiber supplements, such as metamucil  - prunes, prune juice    5. INCREASE water intake    6. Stop fluids 2 hours before bed, and urinate just before bed

## 2023-03-22 NOTE — PROGRESS NOTES
CHIEF COMPLAINT:    Mrs Baird is a 43 y.o. female presenting for urinary incontinence.    PRESENTING ILLNESS:  Summer Izabella Baird is a 43 y.o. female with a PMH of seizure disorder, HTN, OAB who presents for urinary incontinence. This is her initial clinic.    3/22/23  Patient presents for urinary incontinence. C/o daytime frequency, nocturia x 2-3,  urgency, UUI and  UTE with laughing and coughing. She wears ~5 panty liners per day and changes them when 75% saturated. She was on Vesicare in the past but stopped because not covered by insurance and also had botox injection for incontinence.     UA today small blood, otherwise negative (pt on menstrual cycle)  PVR: 0 ml    22   Micro UA RBC 2, WBC 2    Drinks: sweet tea, 1 can soda, and water 3-4 bottles water. Stops liquids at 7 pm.    Constipation: denies    History of kidney stones: lithotripsy done 18 years ago. Passed stone 3-4 years ago.  History of recurrent UTI: none  Personal or family hx of  malignancy: none  History of  trauma: none  Smoking history: quit 3-4 years ago, smoked for 20 years      Urine cultures:   No results found for: LABURIN      REVIEW OF SYSTEMS:    Review of Systems    Constitutional: Negative for fever and chills.   HENT: Negative for hearing loss.   Eyes: Negative for visual disturbance.   Respiratory: Negative for shortness of breath.   Cardiovascular: Negative for chest pain.   Gastrointestinal: Negative for nausea, vomiting, and constipation.   Genitourinary:  See above  Neurological: Negative for dizziness.   Hematological: Does not bruise/bleed easily.   Psychiatric/Behavioral: Negative for confusion.     PATIENT HISTORY:    Past Medical History:   Diagnosis Date    Asthma     GERD (gastroesophageal reflux disease)     Hypertension     Seizures     Traumatic tear of left rotator cuff        Past Surgical History:   Procedure Laterality Date    APPENDECTOMY      BILATERAL TUBAL LIGATION       SECTION       EPIDURAL STEROID INJECTION N/A 3/16/2023    Procedure: Injection, Steroid, Epidural L5-S1;  Surgeon: Jerman Evans MD;  Location: [facility] OR;  Service: Pain Management;  Laterality: N/A;    EPIDURAL STEROID INJECTION INTO LUMBAR SPINE N/A 07/12/2021    Procedure: Injection-steroid-epidural-lumbar;  Surgeon: Yolie Lebron MD;  Location: [facility] OR;  Service: Pain Management;  Laterality: N/A;  L5-S1     INJECTION OF JOINT Left 05/27/2022    Procedure: Injection, Joint Sacroiliac Left and GTB;  Surgeon: Yolie Lebron MD;  Location: [facility] OR;  Service: Pain Management;  Laterality: Left;    JOINT REPLACEMENT      REPLACEMENT OF BATTERY OF VAGUS NERVE STIMULATOR Left 09/14/2022    Procedure: REPLACEMENT, BATTERY, NEUROSTIMULATOR, VAGAL- revision;  Surgeon: Berto Corona MD;  [facility];  Service: Neurosurgery;  Laterality: Left;    VAGAL NERVE STIMULATOR placement Left 06/2021       Family History   Problem Relation Age of Onset    Hyperlipidemia Mother     Hypertension Mother     Stroke Mother     Heart disease Mother     COPD Mother     COPD Father     Cancer Father         brain and liver cancer       Social History     Socioeconomic History    Marital status: Legally     Number of children: 2   Tobacco Use    Smoking status: Every Day     Packs/day: 1.50     Years: 20.00     Pack years: 30.00     Types: Vaping with nicotine, Cigarettes    Smokeless tobacco: Never    Tobacco comments:     started at age 22 daily use   Substance and Sexual Activity    Alcohol use: Not Currently    Drug use: Never    Sexual activity: Yes     Partners: Male     Birth control/protection: None     Social Determinants of Health     Financial Resource Strain: Unknown    Difficulty of Paying Living Expenses: Patient refused   Food Insecurity: Unknown    Worried About Running Out of Food in the Last Year: Patient refused    Ran Out of Food in the Last Year: Patient refused   Transportation Needs: Unknown    Lack of  Transportation (Medical): Patient refused    Lack of Transportation (Non-Medical): Patient refused   Physical Activity: Insufficiently Active    Days of Exercise per Week: 4 days    Minutes of Exercise per Session: 20 min   Stress: Stress Concern Present    Feeling of Stress : Very much   Social Connections: Unknown    Frequency of Communication with Friends and Family: More than three times a week    Frequency of Social Gatherings with Friends and Family: Once a week    Attends Mandaen Services: Never    Active Member of Clubs or Organizations: No    Attends Club or Organization Meetings: Never    Marital Status: Patient refused   Housing Stability: Unknown    Unable to Pay for Housing in the Last Year: Patient refused    Number of Places Lived in the Last Year: 2    Unstable Housing in the Last Year: No       Allergies:  Depakote [divalproex] and Dilantin [phenytoin sodium extended]    Medications:    Current Outpatient Medications:     acetaminophen (TYLENOL) 325 MG tablet, Take 325 mg by mouth every 6 (six) hours as needed for Pain., Disp: , Rfl:     cetirizine (ZYRTEC) 10 MG tablet, Take 1 tablet (10 mg total) by mouth once daily., Disp: 90 tablet, Rfl: 3    gabapentin (NEURONTIN) 600 MG tablet, TAKE 1 TABLET THREE TIMES DAILY, Disp: 90 tablet, Rfl: 1    lamoTRIgine (LAMICTAL) 150 MG Tab, Take 2 tablets (300 mg total) by mouth 2 (two) times daily. Take with 25 mg tablets for dose of 325 mg BID, Disp: 360 tablet, Rfl: 0    lamoTRIgine (LAMICTAL) 25 MG tablet, Take 1 tablet (25 mg total) by mouth 2 (two) times daily., Disp: 180 tablet, Rfl: 0    levETIRAcetam (KEPPRA) 750 MG Tab, Take 2 tablets (1,500 mg total) by mouth 2 (two) times daily., Disp: 360 tablet, Rfl: 1    losartan (COZAAR) 50 MG tablet, Take 1 tablet (50 mg total) by mouth every evening., Disp: 90 tablet, Rfl: 3    metoprolol succinate (TOPROL-XL) 50 MG 24 hr tablet, Take 1 tablet (50 mg total) by mouth once daily., Disp: 90 tablet, Rfl: 3     multivitamin with minerals tablet, Take 1 tablet by mouth once daily., Disp: , Rfl:     naproxen (NAPROSYN) 500 MG tablet, Take 500 mg by mouth 2 (two) times daily as needed., Disp: , Rfl:     omeprazole (PRILOSEC) 40 MG capsule, Take 1 capsule (40 mg total) by mouth every morning., Disp: 90 capsule, Rfl: 3    ondansetron (ZOFRAN) 4 MG tablet, Take 8 mg by mouth every 8 (eight) hours as needed. , Disp: , Rfl:     oxyCODONE-acetaminophen (PERCOCET) 5-325 mg per tablet, Take 1 tablet by mouth every 6 (six) hours as needed for Pain., Disp: 120 tablet, Rfl: 0    potassium gluconate 595 mg (99 mg) Tab, Take 1 tablet by mouth once daily., Disp: , Rfl:     TENS UNITS MISC, by Misc.(Non-Drug; Combo Route) route., Disp: , Rfl:     varenicline (CHANTIX) 1 mg Tab, Take 1 tablet (1 mg total) by mouth 2 (two) times daily., Disp: 180 tablet, Rfl: 0    vitamin D (VITAMIN D3) 1000 units Tab, Take 1,000 Units by mouth once daily., Disp: , Rfl:     nortriptyline (PAMELOR) 25 MG capsule, Take 25 mg by mouth every evening., Disp: , Rfl:     solifenacin (VESICARE) 10 MG tablet, Take 1 tablet (10 mg total) by mouth once daily., Disp: 90 tablet, Rfl: 3    PHYSICAL EXAMINATION:    Constitutional: She is oriented to person, place, and time. She appears well-developed and well-nourished.  She is in no apparent distress.    Neck: Normal ROM.     Cardiovascular: Normal rate.      Pulmonary/Chest: Effort normal. No respiratory distress.     Abdominal:  She exhibits no distension.  There is no CVA tenderness.     Neurological: She is alert and oriented to person, place, and time.     Skin: Skin is warm and dry.     Psych: Cooperative with normal affect.    Physical Exam      LABS:    Lab Results   Component Value Date    CREATININE 0.7 09/14/2022       IMPRESSION:    Encounter Diagnoses   Name Primary?    History of kidney stones Yes    Nocturia     Urinary frequency     Mixed incontinence        PLAN:  -LUZ to evaluate hx of kidney  stones    -Repeat UA at next visit since patient on menstrual cycle. Normal Micro UA 9/22.    -Discussed OAB with patient. Conservative measures reviewed with patient and given on AVS. Pt would like to restart Vesicare. Discussed side effects, indications, and MOA for Vesicare. Prescription sent to the pharmacy. Pt verbalized understanding. She is also interested in discussing botox with MD.    -Discussed kegels for stress incontinence. Educated her to do 10 sets of 10 daily- squeeze for 10 seconds and rest for 10 seconds. Also, gave handout on kegels. Pelvic floor exercises given on AVS.    -RTC 3 months with MD to discuss botox injections and for symptom check and PVR check.     I encouraged her or any of her family members to call or email me with questions and/or concerns.      45 minutes of total time spent on the encounter, which includes face to face time and non-face to face time preparing to see the patient (eg, review of tests), Obtaining and/or reviewing separately obtained history, Documenting clinical information in the electronic or other health record, Independently interpreting results (not separately reported) and communicating results to the patient/family/caregiver, or Care coordination (not separately reported).

## 2023-03-24 ENCOUNTER — OFFICE VISIT (OUTPATIENT)
Dept: FAMILY MEDICINE | Facility: CLINIC | Age: 43
End: 2023-03-24
Payer: COMMERCIAL

## 2023-03-24 DIAGNOSIS — I10 PRIMARY HYPERTENSION: ICD-10-CM

## 2023-03-24 DIAGNOSIS — Z12.11 COLON CANCER SCREENING: ICD-10-CM

## 2023-03-24 DIAGNOSIS — E78.2 MIXED HYPERLIPIDEMIA: ICD-10-CM

## 2023-03-24 DIAGNOSIS — G40.909 SEIZURE DISORDER: ICD-10-CM

## 2023-03-24 DIAGNOSIS — M46.1 SACROILIITIS: ICD-10-CM

## 2023-03-24 DIAGNOSIS — E66.01 MORBID OBESITY: ICD-10-CM

## 2023-03-24 DIAGNOSIS — R79.9 ABNORMAL FINDING OF BLOOD CHEMISTRY, UNSPECIFIED: ICD-10-CM

## 2023-03-24 DIAGNOSIS — R04.0 EPISTAXIS: ICD-10-CM

## 2023-03-24 DIAGNOSIS — J32.9 SINUSITIS, UNSPECIFIED CHRONICITY, UNSPECIFIED LOCATION: Primary | ICD-10-CM

## 2023-03-24 DIAGNOSIS — F33.1 DEPRESSION, MAJOR, RECURRENT, MODERATE: ICD-10-CM

## 2023-03-24 DIAGNOSIS — I69.354 HEMIPARESIS AFFECTING LEFT SIDE AS LATE EFFECT OF STROKE: ICD-10-CM

## 2023-03-24 PROBLEM — F19.20 POLYSUBSTANCE DEPENDENCE: Status: RESOLVED | Noted: 2021-05-07 | Resolved: 2023-03-24

## 2023-03-24 PROCEDURE — 99499 RISK ADDL DX/OHS AUDIT: ICD-10-PCS | Mod: 95,,, | Performed by: STUDENT IN AN ORGANIZED HEALTH CARE EDUCATION/TRAINING PROGRAM

## 2023-03-24 PROCEDURE — 99214 PR OFFICE/OUTPT VISIT, EST, LEVL IV, 30-39 MIN: ICD-10-PCS | Mod: 95,,, | Performed by: STUDENT IN AN ORGANIZED HEALTH CARE EDUCATION/TRAINING PROGRAM

## 2023-03-24 PROCEDURE — 99214 OFFICE O/P EST MOD 30 MIN: CPT | Mod: 95,,, | Performed by: STUDENT IN AN ORGANIZED HEALTH CARE EDUCATION/TRAINING PROGRAM

## 2023-03-24 PROCEDURE — 4010F ACE/ARB THERAPY RXD/TAKEN: CPT | Mod: CPTII,95,, | Performed by: STUDENT IN AN ORGANIZED HEALTH CARE EDUCATION/TRAINING PROGRAM

## 2023-03-24 PROCEDURE — 99499 UNLISTED E&M SERVICE: CPT | Mod: 95,,, | Performed by: STUDENT IN AN ORGANIZED HEALTH CARE EDUCATION/TRAINING PROGRAM

## 2023-03-24 PROCEDURE — 4010F PR ACE/ARB THEARPY RXD/TAKEN: ICD-10-PCS | Mod: CPTII,95,, | Performed by: STUDENT IN AN ORGANIZED HEALTH CARE EDUCATION/TRAINING PROGRAM

## 2023-03-24 RX ORDER — DOXYCYCLINE HYCLATE 100 MG
100 TABLET ORAL 2 TIMES DAILY
Qty: 14 TABLET | Refills: 0 | Status: SHIPPED | OUTPATIENT
Start: 2023-03-24 | End: 2023-03-27

## 2023-03-24 RX ORDER — MUPIROCIN 20 MG/G
OINTMENT TOPICAL 3 TIMES DAILY
Qty: 30 G | Refills: 1 | Status: SHIPPED | OUTPATIENT
Start: 2023-03-24 | End: 2023-05-08

## 2023-03-24 RX ORDER — AZELASTINE 1 MG/ML
2 SPRAY, METERED NASAL 2 TIMES DAILY
Qty: 30 ML | Refills: 6 | Status: SHIPPED | OUTPATIENT
Start: 2023-03-24 | End: 2023-09-21

## 2023-03-24 NOTE — PROGRESS NOTES
[facility] Primary Care Note    Subjective:    Chief Complaint:   Chief Complaint   Patient presents with    Sinus Problem      274}    The HPI and pertinent ROS is included in the Diagnostic Impression Remarks section at the end of the note. Please see below for further details.     Annie is a pleasant intelligent patient who is here for evaluation.     The following portions of the patient's history were reviewed and updated as appropriate: allergies, current medications, past family history, past medical history, past social history, past surgical history and problem list.    She  has a past medical history of Asthma, GERD (gastroesophageal reflux disease), Hypertension, Seizures, and Traumatic tear of left rotator cuff.  She  has a past surgical history that includes Joint replacement;  section; Appendectomy; VAGAL NERVE STIMULATOR placement (Left, 2021); Epidural steroid injection into lumbar spine (N/A, 2021); Injection of joint (Left, 2022); Replacement of battery of vagus nerve stimulator (Left, 2022); Bilateral tubal ligation; and Epidural steroid injection (N/A, 3/16/2023).  She  reports that she has been smoking vaping with nicotine and cigarettes. She has a 30.00 pack-year smoking history. She has never used smokeless tobacco. She reports that she does not currently use alcohol. She reports that she does not use drugs.  She family history includes COPD in her father and mother; Cancer in her father; Heart disease in her mother; Hyperlipidemia in her mother; Hypertension in her mother; Stroke in her mother.    Review of patient's allergies indicates:   Allergen Reactions    Depakote [divalproex] Hives    Dilantin [phenytoin sodium extended] Hives       Physical Examination  There were no vitals taken for this visit.   274}  Wt Readings from Last 3 Encounters:   23 118 kg (260 lb 2.3 oz)   23 108 kg (238 lb 1.6 oz)   10/13/22 108 kg (238 lb)     BP Readings  "from Last 3 Encounters:   03/22/23 133/86   03/16/23 (!) 120/92   10/13/22 (!) 142/94     Estimated body mass index is 46.08 kg/m² as calculated from the following:    Height as of 3/22/23: 5' 3" (1.6 m).    Weight as of 3/22/23: 118 kg (260 lb 2.3 oz).     CONSTITUTIONAL: No apparent distress. Does not appear acutely ill or septic. Appears adequately hydrated.  PULM: Breathing unlabored.  PSYCHIATRIC: Alert and conversant and grossly oriented. Mood is grossly neutral. Affect appropriate. Judgment and insight grossly intact.  Integument: normal coloration and turgor, no rashes, no suspicious skin lesions noted.    Data reviewed 274}  Previous medical records reviewed and summarized in HPI.     Laboratory  274}  I have reviewed old labs below:  Lab Results   Component Value Date    WBC 12.47 09/14/2022    HGB 12.7 09/14/2022    HCT 39.0 09/14/2022    MCV 89 09/14/2022     09/14/2022     09/14/2022    K 4.1 09/14/2022     09/14/2022    CALCIUM 9.3 09/14/2022    CO2 24 09/14/2022    GLU 84 09/14/2022    BUN 9 09/14/2022    CREATININE 0.7 09/14/2022    ANIONGAP 10 09/14/2022    ESTGFRAFRICA >60.0 11/29/2021    EGFRNONAA >60.0 11/29/2021    PROT 7.4 11/29/2021    ALBUMIN 4.0 11/29/2021    BILITOT 0.4 11/29/2021    ALKPHOS 77 11/29/2021    ALT 17 11/29/2021    AST 20 11/29/2021    INR 1.0 09/14/2022    CHOL 243 (H) 05/07/2021    TRIG 204 (H) 05/07/2021    HDL 49 05/07/2021    LDLCALC 153.2 05/07/2021    TSH 0.570 11/29/2021    HGBA1C 4.9 05/07/2021     Lab reviewed by me: Particular labs of significance that I will monitor, workup, or treat to improve are mentioned below in diagnostic impression remarks.  :[id number]}274}  Imaging/EKG: I have reviewed the pertinent results/findings and my personal findings are noted below in diagnostic impression remarks.  274}    CC:   Chief Complaint   Patient presents with    Sinus Problem        274}  Assessment/Plan  Annie Izabella Baird is a 43 y.o. female who " "presents with:    1. Sinusitis, unspecified chronicity, unspecified location    2. Epistaxis    3. Primary hypertension    4. Colon cancer screening    5. Mixed hyperlipidemia    6. Abnormal finding of blood chemistry, unspecified    7. Morbid obesity    8. Sacroiliitis    9. Depression, major, recurrent, moderate    10. Hemiparesis affecting left side as late effect of stroke        Diagnostic Impression Remarks + HPI     The patient location is:  Patient Home [place]  The chief complaint leading to consultation is:   Chief Complaint   Patient presents with    Sinus Problem     Total time spent with patient: 20 minutes     Visit type: Virtual visit with synchronous audio and video  Each patient to whom he or she provides medical services by telemedicine is:  (1) informed of the relationship between the physician and patient and the respective role of any other health care provider with respect to management of the patient; and (2) notified that he or she may decline to receive medical services by telemedicine and may withdraw from such care at any time.    This service was not originating from a related E/M service provided within the previous 7 days nor will  to an E/M service or procedure within the next 24 hours or my soonest available appointment.  Prevailing standard of care was able to be met in this synchronous audio and video visit    Documentation entered by me for this encounter may have been done in part using speech-recognition technology. Although I have made an effort to ensure accuracy, "sound like" errors may exist and should be interpreted in context.         Sinusitis- Patient reports for over 10 days had some sinus pressure headaches nasal congestion runny nose no fevers dry cough will send in antibiotic  Morbid obesity-recommend low glycemic index diet stay active monitor A1c and lipid levels.  TSH within normal limits.  Sacroiliitis-stable rec cont gabapentin   Depression-stable cont " current meds monitor no SI/plan   Hypertension well controlled continue current meds   Epistaxis minor recommend mupirocin ointment can use Afrin p.r.n. not severe  Health maintenance-recommend [facility] screening [facility]    This is the extent of the patient's complaints at this present time. She denies chest pain upon exertion, dyspnea, nausea, vomiting, diaphoresis, and syncope. No pleuritic chest pain, unilateral leg swelling, calf tenderness, or calf pain. Denies unintentional wt loss sweats and fevers.     Summer will return to clinic in a few months for further workup and reassessment or sooner as needed. She was instructed to call the clinic or go to the emergency department if her symptoms do not improve, worsens, or if new symptoms develop. As we discussed that symptoms could worsen over the next 24 hours she was advised that if any increased swelling, pain, or numbness arise to go immediately to the ED. Patient knows to call any time if an emergency arises. Shared decision making occurred and she verbalized understanding in agreement with this plan.      274}      She was counseled about the importance of cancer screening.     Medication Monitoring    In today's visit, monitoring for drug toxicity was accomplished. Proper use of medications was also discussed.     I discussed imaging findings, diagnosis, possibilities, treatment options, medications, risks, and benefits. She had many questions regarding the options and long-term effects. All questions were answered. She expressed understanding after counseling regarding the diagnosis and recommendations. She was capable and demonstrated competence with understanding of these options. Shared decision making was performed resulting in her choosing the current treatment plan. Patient handout was given with instructions and recommendations. Advised the patient that if they become pregnant to alert us immediately to assess for medication  changes.     I also discussed the importance of close follow up to discuss labs, change or modify her medications if needed, monitor side effects, and further evaluation of medical problems.     Additional workup planned: see labs ordered below.    See below for labs and meds ordered with associated diagnosis    1. Sinusitis, unspecified chronicity, unspecified location  - doxycycline (VIBRA-TABS) 100 MG tablet; Take 1 tablet (100 mg total) by mouth 2 (two) times daily. for 7 days  Dispense: 14 tablet; Refill: 0  - azelastine (ASTELIN) 137 mcg (0.1 %) nasal spray; 2 sprays (274 mcg total) by Nasal route 2 (two) times daily.  Dispense: 30 mL; Refill: 6    2. Epistaxis  - mupirocin (BACTROBAN) 2 % ointment; Apply topically 3 (three) times daily.  Dispense: 30 g; Refill: 1    3. Primary hypertension  - Comprehensive Metabolic Panel; Future    4. Colon cancer screening  - Cologuard Screening (Multitarget Stool DNA); Future  - Cologuard Screening (Multitarget Stool DNA)    5. Mixed hyperlipidemia  - Lipid Panel; Future    6. Abnormal finding of blood chemistry, unspecified  - CBC Auto Differential; Future  - Comprehensive Metabolic Panel; Future  - Hemoglobin A1C; Future  - Lipid Panel; Future  - TSH; Future    7. Morbid obesity    8. Sacroiliitis    9. Depression, major, recurrent, moderate    10. Hemiparesis affecting left side as late effect of stroke       Medication List with Changes/Refills   New Medications    AZELASTINE (ASTELIN) 137 MCG (0.1 %) NASAL SPRAY    2 sprays (274 mcg total) by Nasal route 2 (two) times daily.    DOXYCYCLINE (VIBRA-TABS) 100 MG TABLET    Take 1 tablet (100 mg total) by mouth 2 (two) times daily. for 7 days    MUPIROCIN (BACTROBAN) 2 % OINTMENT    Apply topically 3 (three) times daily.   Current Medications    ACETAMINOPHEN (TYLENOL) 325 MG TABLET    Take 325 mg by mouth every 6 (six) hours as needed for Pain.    CETIRIZINE (ZYRTEC) 10 MG TABLET    Take 1 tablet (10 mg total) by mouth  "once daily.    GABAPENTIN (NEURONTIN) 600 MG TABLET    TAKE 1 TABLET THREE TIMES DAILY    LAMOTRIGINE (LAMICTAL) 150 MG TAB    TAKE 2 TABLETS TWICE DAILY TAKE WITH 25 MG TABLETS FOR DOSE  MG TWICE DAILY    LAMOTRIGINE (LAMICTAL) 25 MG TABLET    TAKE 1 TABLET TWICE DAILY    LEVETIRACETAM (KEPPRA) 750 MG TAB    Take 2 tablets (1,500 mg total) by mouth 2 (two) times daily.    LOSARTAN (COZAAR) 50 MG TABLET    Take 1 tablet (50 mg total) by mouth every evening.    METOPROLOL SUCCINATE (TOPROL-XL) 50 MG 24 HR TABLET    Take 1 tablet (50 mg total) by mouth once daily.    MULTIVITAMIN WITH MINERALS TABLET    Take 1 tablet by mouth once daily.    NAPROXEN (NAPROSYN) 500 MG TABLET    Take 500 mg by mouth 2 (two) times daily as needed.    NORTRIPTYLINE (PAMELOR) 25 MG CAPSULE    Take 25 mg by mouth every evening.    OMEPRAZOLE (PRILOSEC) 40 MG CAPSULE    Take 1 capsule (40 mg total) by mouth every morning.    ONDANSETRON (ZOFRAN) 4 MG TABLET    Take 8 mg by mouth every 8 (eight) hours as needed.     OXYCODONE-ACETAMINOPHEN (PERCOCET) 5-325 MG PER TABLET    Take 1 tablet by mouth every 6 (six) hours as needed for Pain.    POTASSIUM GLUCONATE 595 MG (99 MG) TAB    Take 1 tablet by mouth once daily.    SOLIFENACIN (VESICARE) 10 MG TABLET    Take 1 tablet (10 mg total) by mouth once daily.    TENS UNITS MISC    by Misc.(Non-Drug; Combo Route) route.    VARENICLINE (CHANTIX) 1 MG TAB    Take 1 tablet (1 mg total) by mouth 2 (two) times daily.    VITAMIN D (VITAMIN D3) 1000 UNITS TAB    Take 1,000 Units by mouth once daily.     Modified Medications    No medications on file       Andre Kirk MD  03/24/2023     Documentation entered by me for this encounter may have been done in part using speech-recognition technology. Although I have made an effort to ensure accuracy, "sound like" errors may exist and should be interpreted in context.    If you are due for any health screening(s) below please notify me so we can arrange " them to be ordered and scheduled to maintain your health. Most healthy patients at your age complete them, but you are free to accept or refuse.   Tests to Keep You Healthy    Mammogram: ORDERED BUT NOT SCHEDULED  Colon Cancer Screening: ORDERED  Cervical Cancer Screening: Met on 7/28/2022  Last Blood Pressure <= 139/89 (9/21/2022): Yes

## 2023-03-28 ENCOUNTER — TELEPHONE (OUTPATIENT)
Dept: FAMILY MEDICINE | Facility: CLINIC | Age: 43
End: 2023-03-28
Payer: COMMERCIAL

## 2023-03-28 DIAGNOSIS — G89.29 CHRONIC MIDLINE LOW BACK PAIN, UNSPECIFIED WHETHER SCIATICA PRESENT: ICD-10-CM

## 2023-03-28 DIAGNOSIS — M54.50 CHRONIC MIDLINE LOW BACK PAIN, UNSPECIFIED WHETHER SCIATICA PRESENT: ICD-10-CM

## 2023-03-28 RX ORDER — GABAPENTIN 600 MG/1
TABLET ORAL
Qty: 90 TABLET | Refills: 1 | OUTPATIENT
Start: 2023-03-28

## 2023-03-28 RX ORDER — GABAPENTIN 600 MG/1
600 TABLET ORAL 3 TIMES DAILY
Qty: 270 TABLET | Refills: 0 | Status: SHIPPED | OUTPATIENT
Start: 2023-03-28 | End: 2023-04-21

## 2023-03-28 NOTE — TELEPHONE ENCOUNTER
----- Message from Nayely Phillips sent at 3/28/2023  1:51 PM CDT -----  Regarding: ret call  Type:  Patient Returning Call    Who Called:  Annie  Who Left Message for Patient:  didn't say a name  Does the patient know what this is regarding?:  no  Best Call Back Number:  [phone]    Additional Information:

## 2023-03-28 NOTE — TELEPHONE ENCOUNTER
No new care gaps identified.  [facility] Embedded Care Gaps. Reference number: [id number]. 3/28/2023   8:46:21 AM RIKIT

## 2023-04-04 ENCOUNTER — TELEPHONE (OUTPATIENT)
Dept: PAIN MEDICINE | Facility: CLINIC | Age: 43
End: 2023-04-04
Payer: COMMERCIAL

## 2023-04-04 ENCOUNTER — OFFICE VISIT (OUTPATIENT)
Dept: PAIN MEDICINE | Facility: CLINIC | Age: 43
End: 2023-04-04
Payer: COMMERCIAL

## 2023-04-04 ENCOUNTER — PATIENT MESSAGE (OUTPATIENT)
Dept: PAIN MEDICINE | Facility: CLINIC | Age: 43
End: 2023-04-04

## 2023-04-04 DIAGNOSIS — M53.3 SACROILIAC JOINT PAIN: ICD-10-CM

## 2023-04-04 DIAGNOSIS — M50.30 DEGENERATIVE DISC DISEASE, CERVICAL: ICD-10-CM

## 2023-04-04 DIAGNOSIS — M51.34 DDD (DEGENERATIVE DISC DISEASE), THORACIC: ICD-10-CM

## 2023-04-04 DIAGNOSIS — Z79.891 CHRONIC USE OF OPIATE FOR THERAPEUTIC PURPOSE: ICD-10-CM

## 2023-04-04 DIAGNOSIS — M51.36 DDD (DEGENERATIVE DISC DISEASE), LUMBAR: Primary | ICD-10-CM

## 2023-04-04 DIAGNOSIS — G89.4 CHRONIC PAIN DISORDER: Primary | ICD-10-CM

## 2023-04-04 DIAGNOSIS — M51.36 DDD (DEGENERATIVE DISC DISEASE), LUMBAR: ICD-10-CM

## 2023-04-04 DIAGNOSIS — M54.16 LUMBAR RADICULOPATHY: ICD-10-CM

## 2023-04-04 DIAGNOSIS — M50.30 DDD (DEGENERATIVE DISC DISEASE), CERVICAL: ICD-10-CM

## 2023-04-04 PROCEDURE — 99999 PR PBB SHADOW E&M-EST. PATIENT-LVL III: ICD-10-PCS | Mod: PBBFAC,,,

## 2023-04-04 PROCEDURE — 80326 AMPHETAMINES 5 OR MORE: CPT

## 2023-04-04 PROCEDURE — 1159F PR MEDICATION LIST DOCUMENTED IN MEDICAL RECORD: ICD-10-PCS | Mod: CPTII,S$GLB,,

## 2023-04-04 PROCEDURE — 4010F ACE/ARB THERAPY RXD/TAKEN: CPT | Mod: CPTII,S$GLB,,

## 2023-04-04 PROCEDURE — 4010F PR ACE/ARB THEARPY RXD/TAKEN: ICD-10-PCS | Mod: CPTII,S$GLB,,

## 2023-04-04 PROCEDURE — 1160F RVW MEDS BY RX/DR IN RCRD: CPT | Mod: CPTII,S$GLB,,

## 2023-04-04 PROCEDURE — 99214 OFFICE O/P EST MOD 30 MIN: CPT | Mod: S$GLB,,,

## 2023-04-04 PROCEDURE — 99999 PR PBB SHADOW E&M-EST. PATIENT-LVL III: CPT | Mod: PBBFAC,,,

## 2023-04-04 PROCEDURE — 1160F PR REVIEW ALL MEDS BY PRESCRIBER/CLIN PHARMACIST DOCUMENTED: ICD-10-PCS | Mod: CPTII,S$GLB,,

## 2023-04-04 PROCEDURE — 1159F MED LIST DOCD IN RCRD: CPT | Mod: CPTII,S$GLB,,

## 2023-04-04 PROCEDURE — 99214 PR OFFICE/OUTPT VISIT, EST, LEVL IV, 30-39 MIN: ICD-10-PCS | Mod: S$GLB,,,

## 2023-04-04 RX ORDER — MONTELUKAST SODIUM 10 MG/1
10 TABLET ORAL
COMMUNITY
Start: 2023-03-15 | End: 2023-10-16

## 2023-04-04 RX ORDER — OXYCODONE AND ACETAMINOPHEN 5; 325 MG/1; MG/1
1 TABLET ORAL EVERY 6 HOURS PRN
Qty: 120 TABLET | Refills: 0 | Status: SHIPPED | OUTPATIENT
Start: 2023-04-08 | End: 2023-05-05 | Stop reason: SDUPTHER

## 2023-04-04 NOTE — PROGRESS NOTES
FOLLOW UP NOTE:     CHIEF COMPLAINT: neck, back, leg, and arm pain    INTERVAL HISTORY OF PRESENT ILLNESS: Annie Baird is a 43 y.o. female with PMH significant for seizure disorder s/p vagal nerve stimulator placement, HTN, hx of anxiety on chronic benzodiazepines, hx of left shoulder arthroscopic surgery (12/4/2012), hx of right CTS release (11/22/2013), hx of multiple CVA's (residual left sided weakness per patient and daughter report), narcolepsy, insomnia, and hx of C5-C6 ACDF (5/1/2019) presents as an established patient for the continued management of chronic pain in multiple areas. The patient presents today for medication refill. She is currently staying in [place] near [place]. The patient is s/p Lumbar PATRICIA at L5-S1 on 3/16/23 with no relief. The patient continues to report of lower back pain. CC Today is worsening pain down right LE, posterior thigh to her right foot.  Today, the patient complains of pain in multiple locations which including her low back, mid back near her shoulder blades, neck, feet, and hands. The patient reports she is scheduled to see Ortho at [facility] for her left shoulder pain.  The patient reports that activity in general worsens her pain. The patient is tolerating her current pain regimen without adverse side effects. The patient denies of any significant changes in her health since her last appointment. The patient also denies of any changes in the character of her pain since her last appointment. The patient reports that her current pain is a 7/10. Patient denies of any urinary/fecal incontinence, saddle anesthesia, or weakness.        INITIAL HISTORY OF PRESENT ILLNESS: Annie Baird is a 41 y.o. female with PMH significant for seizure disorder s/p vagal nerve stimulator placement, HTN, hx of anxiety on chronic benzodiazepines, hx of left shoulder arthroscopic surgery (12/4/2012), hx of right CTS release (11/22/2013), hx of multiple CVA's (residual  left sided weakness per patient and daughter report), narcolepsy, insomnia, and hx of C5-C6 ACDF (5/1/2019) presents as a referral for the evaluation of multiple pain complaints. The patient reports that her low back pain is the worst of her pains. The patient has had a rather extensive work-up in [place], and I have put the relevant diagnostic tests in my note below. In summary:     The patient reports that her pain began approximately 1 year ago when she experienced low back pain. The patient denies of any inciting incident or trauma. The patient reports of worsening pain over time. She reported of progressive symptoms that extended into her lower extremities. She eventually presented to the hospital for evaluation of possible cauda equina syndrome. CT myelogram results copied below. In regards to her pain: the patient localizes her pain to center of her lower back. The patient reports of radiation to her bilateral buttocks and down the posterior aspect of her RLE to her foot. The patient describes her pain as a sharp, stinging, and burning type of pain. The patient reports of numbness in her left 2nd, 3rd, and 4th digits in her hand and her 1st and 2nd toes bilaterally. The patient reports that her pain is a 6/10. Patient denies of any fecal incontinence or saddle anesthesia. The patient reports of daily episodes of urinary incontinence which has been ongoing for the past few months. The patient reports of requiring the assistance of a wheelchair for ambulation for the past few months.      Aggravating factors: activity in general     Mitigating factors: laying on her left side     Relevant Surgeries: yes; history of cervical spine surgery X 1     Interventional Therapies: yes; Dr. Chun Pat in [place]. Dr. Pat did RFA's and epidurals. No benefit.       INTERVENTIONAL PAIN HISTORY:  3/16/2023: Interlaminar PATRICIA at L5-S1 -   7/12/2021:  Lumbar interlaminar epidural steroid injection at L5-S1 under fluoroscopic  guidance (left paramedian approach) - improvement for a few days    CURRENT PAIN MEDICATIONS:   gabapentin 600/600/900 mg PO TID  Norco 7.5- 325 mg PO q 6 hr  zofran 4 mg PO q 8 hr     Previously taking:   Ativan (once or twice a month)  temazepam 30 mg PO QHS (insomnia)        ROS:  Review of Systems   Constitutional:  Negative for chills and fever.   HENT:  Negative for sore throat.    Eyes:  Negative for visual disturbance.   Respiratory:  Negative for shortness of breath.    Cardiovascular:  Negative for chest pain.   Gastrointestinal:  Negative for nausea and vomiting.   Genitourinary:  Negative for difficulty urinating.   Musculoskeletal:  Positive for arthralgias, back pain, gait problem, myalgias and neck pain.   Skin:  Negative for rash.   Allergic/Immunologic: Negative for immunocompromised state.   Neurological:  Positive for seizures, weakness and numbness. Negative for syncope.   Hematological:  Does not bruise/bleed easily.   Psychiatric/Behavioral:  Negative for suicidal ideas.    All other systems reviewed and are negative.     MEDICAL, SURGICAL, FAMILY, SOCIAL HX: reviewed    MEDICATIONS/ALLERGIES: reviewed    PHYSICAL EXAM:    GENERAL: A and O x3, the patient appears well groomed and is in no acute distress.  Skin: No rashes or obvious lesions  HEENT: normocephalic, atraumatic  LUNGS: non labored breathing  ABDOMEN: non distended  UPPER EXTREMITIES and lower extremities. Normal ROM  CERVICAL SPINE:  Cervical spine: ROM is full in flexion, extension and lateral rotation without increased pain.    LUMBAR SPINE  Lumbar spine: ROM is limited with flexion extension and oblique extension with mild-to-moderate increased pain.      MENTAL STATUS: normal orientation, speech, language, and fund of knowledge for social situation.  Emotional state appropriate.    GAIT: normal rise, base, steps, and arm swing.      GAIT: antalgic gait; unsteady gait    IMAGING:    3/2/2023: Cervical MRI: FINDINGS:  Cervical  spine complete five views: There is a vagal stimulator.  There is ACDF with a plate vertebral body screws and a disc implant at C5-C6.  There is DJD.  There is mild neural foraminal narrowing at C5-C6.  No fracture dislocation bone destruction seen.  No complication or hardware failure seen.    3/2/23: XRAY LEFT SHOULDER  FINDINGS:  Shoulder trauma three views left: There is postoperative change of the glenoid.  There is a vagal stimulator.  There is postoperative change of the C-spine.  There is DJD of the glenohumeral joint.  No acute fracture dislocation bone destruction seen.    ASSESSMENT: Annie Baird is a 41 y.o. female with PMH significant for seizure disorder s/p vagal nerve stimulator placement, HTN, hx of anxiety on chronic benzodiazepines, hx of left shoulder arthroscopic surgery (12/4/2012), hx of right CTS release (11/22/2013), hx of multiple CVA's (residual left sided weakness per patient and daughter report), narcolepsy, insomnia, and hx of C5-C6 ACDF (5/1/2019) presents as an established patient,  for the continued management of chronic pain in multiple areas. The patient presents today for medication refill. Treatment plan outlined below.   Encounter Diagnoses   Name Primary?    Chronic use of opiate for therapeutic purpose     DDD (degenerative disc disease), lumbar Yes    Lumbar radiculopathy     DDD (degenerative disc disease), thoracic     Degenerative disc disease, cervical       PLAN:  1. Refilled Norco 7.5-325 mg PO q 6 hr PRN for breakthrough pain; # 120 tablets; 0 refills.  reviewed without discrepancies. RX sent to [facility] in [place] per patient's request due to her currently living there.   2. Continue gabapentin as prescribed for neuropathic pain.  3. Continue to monitor progress of repeat Interlaminar Lumbar PATRICIA at L5- S1   4. Plan to address cervical spine pathology at f/u s/p ortho consult.    5. UDS collected today, the patient last had pain medication today.   6.  F/U 4 weeks or sooner if needed   All medication management performed by Dr. Nathan Leone, NP

## 2023-04-04 NOTE — TELEPHONE ENCOUNTER
----- Message from Eduarda Vela sent at 4/4/2023 12:16 PM CDT -----  Contact: Self  Type:  Patient Returning Call    Who Called:  pt  Who Left Message for Patient:  Fiona  Does the patient know what this is regarding?:  n/a  Best Call Back Number:  [phone]    Additional Information:

## 2023-04-11 ENCOUNTER — PATIENT MESSAGE (OUTPATIENT)
Dept: ADMINISTRATIVE | Facility: HOSPITAL | Age: 43
End: 2023-04-11
Payer: COMMERCIAL

## 2023-04-11 ENCOUNTER — TELEPHONE (OUTPATIENT)
Dept: ORTHOPEDICS | Facility: CLINIC | Age: 43
End: 2023-04-11
Payer: COMMERCIAL

## 2023-04-11 LAB
6MAM UR QL: NOT DETECTED
7AMINOCLONAZEPAM UR QL: NOT DETECTED
A-OH ALPRAZ UR QL: NOT DETECTED
ALPHA-OH-MIDAZOLAM: NOT DETECTED
ALPRAZ UR QL: NOT DETECTED
AMPHET UR QL SCN: NOT DETECTED
ANNOTATION COMMENT IMP: NORMAL
ANNOTATION COMMENT IMP: NORMAL
BARBITURATES UR QL: NOT DETECTED
BUPRENORPHINE UR QL: NOT DETECTED
BZE UR QL: NOT DETECTED
CARBOXYTHC UR QL: NOT DETECTED
CARISOPRODOL UR QL: NOT DETECTED
CLONAZEPAM UR QL: NOT DETECTED
CODEINE UR QL: NOT DETECTED
CREAT UR-MCNC: 99.1 MG/DL (ref 20–400)
DIAZEPAM UR QL: NOT DETECTED
ETHYL GLUCURONIDE UR QL: NOT DETECTED
FENTANYL UR QL: NOT DETECTED
GABAPENTIN: PRESENT
HYDROCODONE UR QL: NOT DETECTED
HYDROMORPHONE UR QL: NOT DETECTED
LORAZEPAM UR QL: NOT DETECTED
MDA UR QL: NOT DETECTED
MDEA UR QL: NOT DETECTED
MDMA UR QL: NOT DETECTED
ME-PHENIDATE UR QL: NOT DETECTED
METHADONE UR QL: NOT DETECTED
METHAMPHET UR QL: NOT DETECTED
MIDAZOLAM UR QL SCN: NOT DETECTED
MORPHINE UR QL: NOT DETECTED
NALOXONE: NOT DETECTED
NORBUPRENORPHINE UR QL CFM: NOT DETECTED
NORDIAZEPAM UR QL: NOT DETECTED
NORFENTANYL UR QL: NOT DETECTED
NORHYDROCODONE UR QL CFM: NOT DETECTED
NORMEPERIDINE UR QL CFM: NOT DETECTED
NOROXYCODONE UR QL CFM: PRESENT
NOROXYMORPHONE UR QL SCN: NOT DETECTED
OXAZEPAM UR QL: NOT DETECTED
OXYCODONE UR QL: PRESENT
OXYMORPHONE UR QL: NOT DETECTED
PATHOLOGY STUDY: NORMAL
PCP UR QL: NOT DETECTED
PHENTERMINE UR QL: NOT DETECTED
PREGABALIN: NOT DETECTED
SERVICE CMNT-IMP: NORMAL
TAPENTADOL UR QL SCN: NOT DETECTED
TAPENTADOL UR QL SCN: NOT DETECTED
TEMAZEPAM UR QL: NOT DETECTED
TRAMADOL UR QL: NOT DETECTED
ZOLPIDEM METABOLITE: NOT DETECTED
ZOLPIDEM UR QL: NOT DETECTED

## 2023-04-11 NOTE — TELEPHONE ENCOUNTER
[facility] for pt. Informed pt that I would try her back again today today before 5pm and if we don't connect I would try her back tomorrow.         ----- Message from Lyle Reis sent at 4/11/2023  3:13 PM CDT -----  Name of Who is Calling: MARGARET COOMBS [[id number]]           What is the request in detail:PT would like an call back directly from the office.Please contact to further discuss and advise.              Can the clinic reply by [facility]: NO           What Number to Call Back if not in [facility]:[phone]

## 2023-04-11 NOTE — TELEPHONE ENCOUNTER
Requested a call back to the [facility] at [phone] to discuss the nature of her shoulder injury, instability vs pain.

## 2023-04-12 ENCOUNTER — TELEPHONE (OUTPATIENT)
Dept: FAMILY MEDICINE | Facility: CLINIC | Age: 43
End: 2023-04-12
Payer: COMMERCIAL

## 2023-04-12 ENCOUNTER — PATIENT MESSAGE (OUTPATIENT)
Dept: FAMILY MEDICINE | Facility: CLINIC | Age: 43
End: 2023-04-12
Payer: COMMERCIAL

## 2023-04-18 ENCOUNTER — PATIENT MESSAGE (OUTPATIENT)
Dept: FAMILY MEDICINE | Facility: CLINIC | Age: 43
End: 2023-04-18
Payer: COMMERCIAL

## 2023-04-18 RX ORDER — LEVETIRACETAM 750 MG/1
1500 TABLET ORAL 2 TIMES DAILY
Qty: 360 TABLET | Refills: 1 | Status: SHIPPED | OUTPATIENT
Start: 2023-04-18 | End: 2023-09-11

## 2023-04-19 ENCOUNTER — PATIENT MESSAGE (OUTPATIENT)
Dept: PAIN MEDICINE | Facility: CLINIC | Age: 43
End: 2023-04-19
Payer: COMMERCIAL

## 2023-04-20 DIAGNOSIS — G89.29 CHRONIC MIDLINE LOW BACK PAIN, UNSPECIFIED WHETHER SCIATICA PRESENT: ICD-10-CM

## 2023-04-20 DIAGNOSIS — M54.50 CHRONIC MIDLINE LOW BACK PAIN, UNSPECIFIED WHETHER SCIATICA PRESENT: ICD-10-CM

## 2023-04-20 DIAGNOSIS — J32.9 SINUSITIS, UNSPECIFIED CHRONICITY, UNSPECIFIED LOCATION: ICD-10-CM

## 2023-04-20 NOTE — TELEPHONE ENCOUNTER
No new care gaps identified.  [facility] Embedded Care Gaps. Reference number: [id number]. 4/20/2023   5:39:20 PM CDT

## 2023-04-21 RX ORDER — GABAPENTIN 600 MG/1
TABLET ORAL
Qty: 270 TABLET | Refills: 1 | Status: SHIPPED | OUTPATIENT
Start: 2023-04-21 | End: 2024-01-30 | Stop reason: SDUPTHER

## 2023-04-21 RX ORDER — DOXYCYCLINE HYCLATE 100 MG
TABLET ORAL
Qty: 14 TABLET | Refills: 0 | OUTPATIENT
Start: 2023-04-21

## 2023-04-21 NOTE — TELEPHONE ENCOUNTER
Please see the attached refill request.  The antibiotics is done, but I wasn't able to take it out of the order.

## 2023-04-28 ENCOUNTER — PATIENT MESSAGE (OUTPATIENT)
Dept: PAIN MEDICINE | Facility: CLINIC | Age: 43
End: 2023-04-28
Payer: COMMERCIAL

## 2023-05-03 ENCOUNTER — TELEPHONE (OUTPATIENT)
Dept: ORTHOPEDICS | Facility: CLINIC | Age: 43
End: 2023-05-03
Payer: COMMERCIAL

## 2023-05-03 NOTE — TELEPHONE ENCOUNTER
Left pt vm asking for a call back to r/s her 5/4 appt     ----- Message from Eulalia Hicks sent at 5/3/2023  2:15 PM CDT -----  Regarding: appt access  Contact: [phone]  Pt calling to cancel appt 5/4/23. Pt needing to rescheduling . Pls call

## 2023-05-04 ENCOUNTER — PATIENT MESSAGE (OUTPATIENT)
Dept: PAIN MEDICINE | Facility: CLINIC | Age: 43
End: 2023-05-04
Payer: COMMERCIAL

## 2023-05-05 ENCOUNTER — OFFICE VISIT (OUTPATIENT)
Dept: PAIN MEDICINE | Facility: CLINIC | Age: 43
End: 2023-05-05
Payer: COMMERCIAL

## 2023-05-05 DIAGNOSIS — M51.36 DDD (DEGENERATIVE DISC DISEASE), LUMBAR: ICD-10-CM

## 2023-05-05 DIAGNOSIS — G89.4 CHRONIC PAIN DISORDER: Primary | ICD-10-CM

## 2023-05-05 DIAGNOSIS — M54.16 LUMBAR RADICULOPATHY: Primary | ICD-10-CM

## 2023-05-05 DIAGNOSIS — M50.30 DDD (DEGENERATIVE DISC DISEASE), CERVICAL: ICD-10-CM

## 2023-05-05 DIAGNOSIS — M53.3 SACROILIAC JOINT PAIN: ICD-10-CM

## 2023-05-05 DIAGNOSIS — G89.4 CHRONIC PAIN DISORDER: ICD-10-CM

## 2023-05-05 DIAGNOSIS — M54.16 LUMBAR RADICULOPATHY: ICD-10-CM

## 2023-05-05 PROCEDURE — 99214 OFFICE O/P EST MOD 30 MIN: CPT | Mod: 95,,,

## 2023-05-05 PROCEDURE — 1160F RVW MEDS BY RX/DR IN RCRD: CPT | Mod: CPTII,95,,

## 2023-05-05 PROCEDURE — 1160F PR REVIEW ALL MEDS BY PRESCRIBER/CLIN PHARMACIST DOCUMENTED: ICD-10-PCS | Mod: CPTII,95,,

## 2023-05-05 PROCEDURE — 1159F MED LIST DOCD IN RCRD: CPT | Mod: CPTII,95,,

## 2023-05-05 PROCEDURE — 4010F ACE/ARB THERAPY RXD/TAKEN: CPT | Mod: CPTII,95,,

## 2023-05-05 PROCEDURE — 1159F PR MEDICATION LIST DOCUMENTED IN MEDICAL RECORD: ICD-10-PCS | Mod: CPTII,95,,

## 2023-05-05 PROCEDURE — 99214 PR OFFICE/OUTPT VISIT, EST, LEVL IV, 30-39 MIN: ICD-10-PCS | Mod: 95,,,

## 2023-05-05 PROCEDURE — 4010F PR ACE/ARB THEARPY RXD/TAKEN: ICD-10-PCS | Mod: CPTII,95,,

## 2023-05-05 RX ORDER — OXYCODONE AND ACETAMINOPHEN 5; 325 MG/1; MG/1
1 TABLET ORAL EVERY 6 HOURS PRN
Qty: 120 TABLET | Refills: 0 | Status: SHIPPED | OUTPATIENT
Start: 2023-05-07 | End: 2023-06-02 | Stop reason: SDUPTHER

## 2023-05-05 NOTE — PROGRESS NOTES
Patient ID: Annie Baird is a 43 y.o. female.    Chief Complaint: follow up visit/med check  HPI  Review of Systems      Reviewed family, medical, surgical, and social history.    Objective:        patient is speaking full sentences  mood and behavior appropriate  no signs of distress  no wheezing heard  No audible congestion in voice  No coughing on the phone  patient doesnt see pus on the tonsils  can move neck in all directions without pain  Reports able to walk normally  able to move all extremities without weakness  reports facial muscles look symmetrical  Patient has no tenderness over the abdomen with pressure  no CVA tenderness according to patient  Assessment:       1. Chronic pain disorder    2. Lumbar radiculopathy    3. DDD (degenerative disc disease), lumbar    4. Sacroiliac joint pain    5. DDD (degenerative disc disease), cervical        Plan:       Chronic pain disorder    Lumbar radiculopathy    DDD (degenerative disc disease), lumbar    Sacroiliac joint pain    DDD (degenerative disc disease), cervical            Risks, benefits, and side effects were discussed with the patient. All questions were answered to the fullest satisfaction of the patient, and pt verbalized understanding and agreement to treatment plan. Pt was to call with any new or worsening symptoms, or present to the ER.    The patient location is:  Patient Home   Visit type: Virtual visit with synchronous audio and video  Each patient to whom he or she provides medical services by telemedicine is:  (1) informed of the relationship between the physician and patient and the respective role of any other health care provider with respect to management of the patient; and (2) notified that he or she may decline to receive medical services by telemedicine and may withdraw from such care at any time.     FOLLOW UP NOTE:     CHIEF COMPLAINT: neck, back, leg, and arm pain    INTERVAL HISTORY OF PRESENT ILLNESS: Annie Parekh  Brie is a 43 y.o. female with PMH significant for seizure disorder s/p vagal nerve stimulator placement, HTN, hx of anxiety on chronic benzodiazepines, hx of left shoulder arthroscopic surgery (12/4/2012), hx of right CTS release (11/22/2013), hx of multiple CVA's (residual left sided weakness per patient and daughter report), narcolepsy, insomnia, and hx of C5-C6 ACDF (5/1/2019) presents as an established patient for a virtual visit for the continued management of chronic pain in multiple areas. The patient presents today for medication refill. She is currently staying in [place] near [place]. The patient continues to report of lower back pain, neck pain, and shoulder pain.  The patient continues to report of lower back pain. CC Today is worsening pain down right LE, posterior thigh to her right foot.  Today, the patient complains of pain in multiple locations which including her low back, mid back near her shoulder blades, neck, feet, and hands. The patient reports she is scheduled to see Ortho at [facility] for her left shoulder pain.  The patient reports that activity in general worsens her pain. The patient is tolerating her current pain regimen without adverse side effects. The patient denies of any significant changes in her health since her last appointment. The patient also denies of any changes in the character of her pain since her last appointment. The patient reports that her current pain is a 7/10. Patient denies of any urinary/fecal incontinence, saddle anesthesia, or weakness.        INITIAL HISTORY OF PRESENT ILLNESS: Annie Baird is a 41 y.o. female with PMH significant for seizure disorder s/p vagal nerve stimulator placement, HTN, hx of anxiety on chronic benzodiazepines, hx of left shoulder arthroscopic surgery (12/4/2012), hx of right CTS release (11/22/2013), hx of multiple CVA's (residual left sided weakness per patient and daughter report), narcolepsy, insomnia, and hx of  C5-C6 ACDF (5/1/2019) presents as a referral for the evaluation of multiple pain complaints. The patient reports that her low back pain is the worst of her pains. The patient has had a rather extensive work-up in [place], and I have put the relevant diagnostic tests in my note below. In summary:     The patient reports that her pain began approximately 1 year ago when she experienced low back pain. The patient denies of any inciting incident or trauma. The patient reports of worsening pain over time. She reported of progressive symptoms that extended into her lower extremities. She eventually presented to the hospital for evaluation of possible cauda equina syndrome. CT myelogram results copied below. In regards to her pain: the patient localizes her pain to center of her lower back. The patient reports of radiation to her bilateral buttocks and down the posterior aspect of her RLE to her foot. The patient describes her pain as a sharp, stinging, and burning type of pain. The patient reports of numbness in her left 2nd, 3rd, and 4th digits in her hand and her 1st and 2nd toes bilaterally. The patient reports that her pain is a 6/10. Patient denies of any fecal incontinence or saddle anesthesia. The patient reports of daily episodes of urinary incontinence which has been ongoing for the past few months. The patient reports of requiring the assistance of a wheelchair for ambulation for the past few months.      Aggravating factors: activity in general     Mitigating factors: laying on her left side     Relevant Surgeries: yes; history of cervical spine surgery X 1     Interventional Therapies: yes; Dr. Chun Pat in [place]. Dr. Pat did RFA's and epidurals. No benefit.       INTERVENTIONAL PAIN HISTORY:  3/16/2023: Interlaminar PATRICIA at L5-S1 - mild relief.   7/12/2021:  Lumbar interlaminar epidural steroid injection at L5-S1 under fluoroscopic guidance (left paramedian approach) - improvement for a few days    CURRENT  PAIN MEDICATIONS:   gabapentin 600/600/900 mg PO TID  Norco 7.5- 325 mg PO q 6 hr  zofran 4 mg PO q 8 hr     Previously taking:   Ativan (once or twice a month)  temazepam 30 mg PO QHS (insomnia)              IMAGING:    3/2/2023: Cervical MRI: FINDINGS:  Cervical spine complete five views: There is a vagal stimulator.  There is ACDF with a plate vertebral body screws and a disc implant at C5-C6.  There is DJD.  There is mild neural foraminal narrowing at C5-C6.  No fracture dislocation bone destruction seen.  No complication or hardware failure seen.    3/2/23: XRAY LEFT SHOULDER  FINDINGS:  Shoulder trauma three views left: There is postoperative change of the glenoid.  There is a vagal stimulator.  There is postoperative change of the C-spine.  There is DJD of the glenohumeral joint.  No acute fracture dislocation bone destruction seen.    ASSESSMENT: Annie Baird is a 41 y.o. female with PMH significant for seizure disorder s/p vagal nerve stimulator placement, HTN, hx of anxiety on chronic benzodiazepines, hx of left shoulder arthroscopic surgery (12/4/2012), hx of right CTS release (11/22/2013), hx of multiple CVA's (residual left sided weakness per patient and daughter report), narcolepsy, insomnia, and hx of C5-C6 ACDF (5/1/2019) presents as an established patient,  for the continued management of chronic pain in multiple areas. The patient presents today for medication refill. Treatment plan outlined below.   Encounter Diagnoses   Name Primary?    Lumbar radiculopathy     DDD (degenerative disc disease), lumbar     Sacroiliac joint pain     Chronic pain disorder Yes    DDD (degenerative disc disease), cervical         PLAN:  1. Refilled Percocet 5-325 mg PO q 6 hr PRN for breakthrough pain; # 120 tablets; 0 refills.  reviewed without discrepancies. RX sent to [facility] in [place] per patient's request due to her currently living there.   2. Continue gabapentin as prescribed for neuropathic  pain.  3. Continue to monitor progress of repeat Interlaminar Lumbar PATRICIA at L5- S1   4. Plan to address cervical spine pathology at f/u s/p ortho consult.    5. Prior UDS consistent with medication prescribed.   5. F/U 4 weeks or sooner if needed   All medication management performed by Dr. Nathan Leone, NP

## 2023-05-07 DIAGNOSIS — R04.0 EPISTAXIS: ICD-10-CM

## 2023-05-08 RX ORDER — MUPIROCIN 20 MG/G
OINTMENT TOPICAL
Qty: 22 G | Refills: 11 | Status: SHIPPED | OUTPATIENT
Start: 2023-05-08 | End: 2024-01-30 | Stop reason: SDUPTHER

## 2023-05-17 ENCOUNTER — PATIENT MESSAGE (OUTPATIENT)
Dept: ADMINISTRATIVE | Facility: HOSPITAL | Age: 43
End: 2023-05-17
Payer: COMMERCIAL

## 2023-06-02 ENCOUNTER — OFFICE VISIT (OUTPATIENT)
Dept: PAIN MEDICINE | Facility: CLINIC | Age: 43
End: 2023-06-02
Payer: COMMERCIAL

## 2023-06-02 DIAGNOSIS — M51.36 DDD (DEGENERATIVE DISC DISEASE), LUMBAR: Primary | ICD-10-CM

## 2023-06-02 DIAGNOSIS — G89.4 CHRONIC PAIN DISORDER: ICD-10-CM

## 2023-06-02 DIAGNOSIS — M54.16 LUMBAR RADICULOPATHY: ICD-10-CM

## 2023-06-02 DIAGNOSIS — M50.30 DDD (DEGENERATIVE DISC DISEASE), CERVICAL: ICD-10-CM

## 2023-06-02 DIAGNOSIS — M51.34 DDD (DEGENERATIVE DISC DISEASE), THORACIC: ICD-10-CM

## 2023-06-02 DIAGNOSIS — M53.3 SACROILIAC JOINT PAIN: ICD-10-CM

## 2023-06-02 DIAGNOSIS — Z79.891 CHRONIC USE OF OPIATE FOR THERAPEUTIC PURPOSE: ICD-10-CM

## 2023-06-02 DIAGNOSIS — M51.36 DDD (DEGENERATIVE DISC DISEASE), LUMBAR: ICD-10-CM

## 2023-06-02 PROCEDURE — 1160F RVW MEDS BY RX/DR IN RCRD: CPT | Mod: CPTII,S$GLB,,

## 2023-06-02 PROCEDURE — 4010F ACE/ARB THERAPY RXD/TAKEN: CPT | Mod: CPTII,S$GLB,,

## 2023-06-02 PROCEDURE — 1160F PR REVIEW ALL MEDS BY PRESCRIBER/CLIN PHARMACIST DOCUMENTED: ICD-10-PCS | Mod: CPTII,S$GLB,,

## 2023-06-02 PROCEDURE — 1159F PR MEDICATION LIST DOCUMENTED IN MEDICAL RECORD: ICD-10-PCS | Mod: CPTII,S$GLB,,

## 2023-06-02 PROCEDURE — 80326 AMPHETAMINES 5 OR MORE: CPT

## 2023-06-02 PROCEDURE — 99999 PR PBB SHADOW E&M-EST. PATIENT-LVL IV: CPT | Mod: PBBFAC,,,

## 2023-06-02 PROCEDURE — 99999 PR PBB SHADOW E&M-EST. PATIENT-LVL IV: ICD-10-PCS | Mod: PBBFAC,,,

## 2023-06-02 PROCEDURE — 1159F MED LIST DOCD IN RCRD: CPT | Mod: CPTII,S$GLB,,

## 2023-06-02 PROCEDURE — 4010F PR ACE/ARB THEARPY RXD/TAKEN: ICD-10-PCS | Mod: CPTII,S$GLB,,

## 2023-06-02 PROCEDURE — 99214 PR OFFICE/OUTPT VISIT, EST, LEVL IV, 30-39 MIN: ICD-10-PCS | Mod: S$GLB,,,

## 2023-06-02 PROCEDURE — 99214 OFFICE O/P EST MOD 30 MIN: CPT | Mod: S$GLB,,,

## 2023-06-02 RX ORDER — OXYCODONE AND ACETAMINOPHEN 5; 325 MG/1; MG/1
1 TABLET ORAL EVERY 6 HOURS PRN
Qty: 120 TABLET | Refills: 0 | Status: SHIPPED | OUTPATIENT
Start: 2023-06-05 | End: 2023-08-01 | Stop reason: SDUPTHER

## 2023-06-02 RX ORDER — OXYCODONE AND ACETAMINOPHEN 5; 325 MG/1; MG/1
1 TABLET ORAL EVERY 6 HOURS PRN
Qty: 120 TABLET | Refills: 0 | Status: SHIPPED | OUTPATIENT
Start: 2023-07-03 | End: 2023-08-18 | Stop reason: ALTCHOICE

## 2023-06-02 NOTE — PROGRESS NOTES
FOLLOW UP NOTE:     CHIEF COMPLAINT: neck, back, leg, and arm pain    INTERVAL HISTORY OF PRESENT ILLNESS: Annie Baird is a 43 y.o. female with PMH significant for seizure disorder s/p vagal nerve stimulator placement, HTN, hx of anxiety on chronic benzodiazepines, hx of left shoulder arthroscopic surgery (12/4/2012), hx of right CTS release (11/22/2013), hx of multiple CVA's (residual left sided weakness per patient and daughter report), narcolepsy, insomnia, and hx of C5-C6 ACDF (5/1/2019) presents as an established patient for the continued management of chronic pain in multiple areas. The patient presents today for medication refill. She is currently staying in [place] near [place]. The patient is s/p Lumbar PATRICIA at L5-S1 on 3/16/23 with moderate relief. The patient was moving s/p PATRICIA and attributes her lower back pain to the move.  The patient continues to report of lower back pain. CC Today is worsening pain down right LE, posterior thigh to her right foot.   The patient reports she is scheduled to see Ortho at [facility] for her left shoulder pain.  The patient reports that activity in general worsens her pain. The patient is tolerating her current pain regimen without adverse side effects. The patient reports of increase of stress as of late as well due to family conflicts. The patient denies of any significant changes in her health since her last appointment. The patient also denies of any changes in the character of her pain since her last appointment. The patient reports that her current pain is a 3/10. Patient denies of any urinary/fecal incontinence, saddle anesthesia, or weakness.        INITIAL HISTORY OF PRESENT ILLNESS: Annie Baird is a 41 y.o. female with PMH significant for seizure disorder s/p vagal nerve stimulator placement, HTN, hx of anxiety on chronic benzodiazepines, hx of left shoulder arthroscopic surgery (12/4/2012), hx of right CTS release (11/22/2013), hx of  multiple CVA's (residual left sided weakness per patient and daughter report), narcolepsy, insomnia, and hx of C5-C6 ACDF (5/1/2019) presents as a referral for the evaluation of multiple pain complaints. The patient reports that her low back pain is the worst of her pains. The patient has had a rather extensive work-up in [place], and I have put the relevant diagnostic tests in my note below. In summary:     The patient reports that her pain began approximately 1 year ago when she experienced low back pain. The patient denies of any inciting incident or trauma. The patient reports of worsening pain over time. She reported of progressive symptoms that extended into her lower extremities. She eventually presented to the hospital for evaluation of possible cauda equina syndrome. CT myelogram results copied below. In regards to her pain: the patient localizes her pain to center of her lower back. The patient reports of radiation to her bilateral buttocks and down the posterior aspect of her RLE to her foot. The patient describes her pain as a sharp, stinging, and burning type of pain. The patient reports of numbness in her left 2nd, 3rd, and 4th digits in her hand and her 1st and 2nd toes bilaterally. The patient reports that her pain is a 6/10. Patient denies of any fecal incontinence or saddle anesthesia. The patient reports of daily episodes of urinary incontinence which has been ongoing for the past few months. The patient reports of requiring the assistance of a wheelchair for ambulation for the past few months.      Aggravating factors: activity in general     Mitigating factors: laying on her left side     Relevant Surgeries: yes; history of cervical spine surgery X 1     Interventional Therapies: yes; Dr. Chun Pat in [place]. Dr. Pat did RFA's and epidurals. No benefit.       INTERVENTIONAL PAIN HISTORY:  3/16/2023: Interlaminar PATRICIA at L5-S1 - moderate relief.    7/12/2021:  Lumbar interlaminar epidural  steroid injection at L5-S1 under fluoroscopic guidance (left paramedian approach) - improvement for a few days    CURRENT PAIN MEDICATIONS:   gabapentin 600/600/900 mg PO TID  Norco 7.5- 325 mg PO q 6 hr  zofran 4 mg PO q 8 hr     Previously taking:   Ativan (once or twice a month)  temazepam 30 mg PO QHS (insomnia)        ROS:  Review of Systems   Constitutional:  Negative for chills and fever.   HENT:  Negative for sore throat.    Eyes:  Negative for visual disturbance.   Respiratory:  Negative for shortness of breath.    Cardiovascular:  Negative for chest pain.   Gastrointestinal:  Negative for nausea and vomiting.   Genitourinary:  Negative for difficulty urinating.   Musculoskeletal:  Positive for arthralgias, back pain, gait problem, myalgias and neck pain.   Skin:  Negative for rash.   Allergic/Immunologic: Negative for immunocompromised state.   Neurological:  Positive for seizures, weakness and numbness. Negative for syncope.   Hematological:  Does not bruise/bleed easily.   Psychiatric/Behavioral:  Negative for suicidal ideas.    All other systems reviewed and are negative.     MEDICAL, SURGICAL, FAMILY, SOCIAL HX: reviewed    MEDICATIONS/ALLERGIES: reviewed    PHYSICAL EXAM:    GENERAL: A and O x3, the patient appears well groomed and is in no acute distress.  Skin: No rashes or obvious lesions  HEENT: normocephalic, atraumatic  LUNGS: non labored breathing  ABDOMEN: non distended  UPPER EXTREMITIES and lower extremities. Normal ROM  CERVICAL SPINE:  Cervical spine: ROM is full in flexion, extension and lateral rotation without increased pain.    LUMBAR SPINE  Lumbar spine: ROM is limited with flexion extension and oblique extension with mild-to-moderate increased pain.      MENTAL STATUS: normal orientation, speech, language, and fund of knowledge for social situation.  Emotional state appropriate.    GAIT: normal rise, base, steps, and arm swing.      GAIT: antalgic gait; unsteady gait    IMAGING:     3/2/2023: Cervical MRI: FINDINGS:  Cervical spine complete five views: There is a vagal stimulator.  There is ACDF with a plate vertebral body screws and a disc implant at C5-C6.  There is DJD.  There is mild neural foraminal narrowing at C5-C6.  No fracture dislocation bone destruction seen.  No complication or hardware failure seen.    3/2/23: XRAY LEFT SHOULDER  FINDINGS:  Shoulder trauma three views left: There is postoperative change of the glenoid.  There is a vagal stimulator.  There is postoperative change of the C-spine.  There is DJD of the glenohumeral joint.  No acute fracture dislocation bone destruction seen.    ASSESSMENT: Annie Baird is a 41 y.o. female with PMH significant for seizure disorder s/p vagal nerve stimulator placement, HTN, hx of anxiety on chronic benzodiazepines, hx of left shoulder arthroscopic surgery (12/4/2012), hx of right CTS release (11/22/2013), hx of multiple CVA's (residual left sided weakness per patient and daughter report), narcolepsy, insomnia, and hx of C5-C6 ACDF (5/1/2019) presents as an established patient,  for the continued management of chronic pain in multiple areas. The patient presents today for medication refill. Treatment plan outlined below.   Encounter Diagnoses   Name Primary?    Chronic use of opiate for therapeutic purpose     Lumbar radiculopathy     DDD (degenerative disc disease), lumbar Yes    DDD (degenerative disc disease), thoracic       PLAN:  1. Refilled Norco 7.5-325 mg PO q 6 hr PRN for breakthrough pain; # 120 tablets; 1 refills.  reviewed without discrepancies. RX sent to [facility] in [place] per patient's request due to her currently living there.   2. Continue gabapentin as prescribed for neuropathic pain.  3. Schedule for repeat Interlaminar Lumbar PATRICIA at L5- S1   4. Plan to address cervical spine pathology at f/u s/p ortho consult.    5. UDS collected today, the patient last had pain medication today.   6. RTC for the  procedure as outlined above   All medication management performed by Dr. Nathan Leone, NP

## 2023-06-11 LAB
6MAM UR QL: NOT DETECTED
7AMINOCLONAZEPAM UR QL: NOT DETECTED
A-OH ALPRAZ UR QL: NOT DETECTED
ALPHA-OH-MIDAZOLAM: NOT DETECTED
ALPRAZ UR QL: NOT DETECTED
AMPHET UR QL SCN: NOT DETECTED
ANNOTATION COMMENT IMP: NORMAL
ANNOTATION COMMENT IMP: NORMAL
BARBITURATES UR QL: NOT DETECTED
BUPRENORPHINE UR QL: NOT DETECTED
BZE UR QL: NOT DETECTED
CARBOXYTHC UR QL: NOT DETECTED
CARISOPRODOL UR QL: NOT DETECTED
CLONAZEPAM UR QL: NOT DETECTED
CODEINE UR QL: NOT DETECTED
CREAT UR-MCNC: 49.5 MG/DL (ref 20–400)
DIAZEPAM UR QL: NOT DETECTED
ETHYL GLUCURONIDE UR QL: NOT DETECTED
FENTANYL UR QL: NOT DETECTED
GABAPENTIN: PRESENT
HYDROCODONE UR QL: NOT DETECTED
HYDROMORPHONE UR QL: NOT DETECTED
LORAZEPAM UR QL: NOT DETECTED
MDA UR QL: NOT DETECTED
MDEA UR QL: NOT DETECTED
MDMA UR QL: NOT DETECTED
ME-PHENIDATE UR QL: NOT DETECTED
METHADONE UR QL: NOT DETECTED
METHAMPHET UR QL: NOT DETECTED
MIDAZOLAM UR QL SCN: NOT DETECTED
MORPHINE UR QL: NOT DETECTED
NALOXONE: NOT DETECTED
NORBUPRENORPHINE UR QL CFM: NOT DETECTED
NORDIAZEPAM UR QL: NOT DETECTED
NORFENTANYL UR QL: NOT DETECTED
NORHYDROCODONE UR QL CFM: NOT DETECTED
NORMEPERIDINE UR QL CFM: NOT DETECTED
NOROXYCODONE UR QL CFM: NOT DETECTED
NOROXYMORPHONE UR QL SCN: NOT DETECTED
OXAZEPAM UR QL: NOT DETECTED
OXYCODONE UR QL: PRESENT
OXYMORPHONE UR QL: NOT DETECTED
PATHOLOGY STUDY: NORMAL
PCP UR QL: NOT DETECTED
PHENTERMINE UR QL: NOT DETECTED
PREGABALIN: NOT DETECTED
SERVICE CMNT-IMP: NORMAL
TAPENTADOL UR QL SCN: NOT DETECTED
TAPENTADOL UR QL SCN: NOT DETECTED
TEMAZEPAM UR QL: NOT DETECTED
TRAMADOL UR QL: NOT DETECTED
ZOLPIDEM METABOLITE: NOT DETECTED
ZOLPIDEM UR QL: NOT DETECTED

## 2023-07-18 ENCOUNTER — TELEPHONE (OUTPATIENT)
Dept: FAMILY MEDICINE | Facility: CLINIC | Age: 43
End: 2023-07-18
Payer: COMMERCIAL

## 2023-08-01 ENCOUNTER — PATIENT MESSAGE (OUTPATIENT)
Dept: PAIN MEDICINE | Facility: CLINIC | Age: 43
End: 2023-08-01
Payer: COMMERCIAL

## 2023-08-01 DIAGNOSIS — M54.16 LUMBAR RADICULOPATHY: ICD-10-CM

## 2023-08-01 DIAGNOSIS — M51.36 DDD (DEGENERATIVE DISC DISEASE), LUMBAR: ICD-10-CM

## 2023-08-01 DIAGNOSIS — M50.30 DDD (DEGENERATIVE DISC DISEASE), CERVICAL: ICD-10-CM

## 2023-08-01 DIAGNOSIS — G89.4 CHRONIC PAIN DISORDER: ICD-10-CM

## 2023-08-01 DIAGNOSIS — M53.3 SACROILIAC JOINT PAIN: ICD-10-CM

## 2023-08-01 RX ORDER — OXYCODONE AND ACETAMINOPHEN 5; 325 MG/1; MG/1
1 TABLET ORAL EVERY 6 HOURS PRN
Qty: 120 TABLET | Refills: 0 | Status: SHIPPED | OUTPATIENT
Start: 2023-08-01 | End: 2023-08-18 | Stop reason: SDUPTHER

## 2023-08-07 ENCOUNTER — PATIENT MESSAGE (OUTPATIENT)
Dept: PAIN MEDICINE | Facility: CLINIC | Age: 43
End: 2023-08-07
Payer: COMMERCIAL

## 2023-08-09 DIAGNOSIS — J32.9 SINUSITIS, UNSPECIFIED CHRONICITY, UNSPECIFIED LOCATION: ICD-10-CM

## 2023-08-09 RX ORDER — DOXYCYCLINE HYCLATE 100 MG
TABLET ORAL
Qty: 14 TABLET | Refills: 0 | OUTPATIENT
Start: 2023-08-09

## 2023-08-18 ENCOUNTER — TELEPHONE (OUTPATIENT)
Dept: PAIN MEDICINE | Facility: CLINIC | Age: 43
End: 2023-08-18

## 2023-08-18 ENCOUNTER — OFFICE VISIT (OUTPATIENT)
Dept: PAIN MEDICINE | Facility: CLINIC | Age: 43
End: 2023-08-18
Payer: COMMERCIAL

## 2023-08-18 VITALS
BODY MASS INDEX: 46.07 KG/M2 | WEIGHT: 260 LBS | HEIGHT: 63 IN | DIASTOLIC BLOOD PRESSURE: 69 MMHG | HEART RATE: 72 BPM | SYSTOLIC BLOOD PRESSURE: 144 MMHG

## 2023-08-18 DIAGNOSIS — M54.16 LUMBAR RADICULOPATHY: Primary | ICD-10-CM

## 2023-08-18 DIAGNOSIS — M54.16 LUMBAR RADICULOPATHY: ICD-10-CM

## 2023-08-18 DIAGNOSIS — G89.4 CHRONIC PAIN DISORDER: Primary | ICD-10-CM

## 2023-08-18 DIAGNOSIS — M50.30 DDD (DEGENERATIVE DISC DISEASE), CERVICAL: ICD-10-CM

## 2023-08-18 DIAGNOSIS — M51.36 DDD (DEGENERATIVE DISC DISEASE), LUMBAR: ICD-10-CM

## 2023-08-18 DIAGNOSIS — M53.3 SACROILIAC JOINT PAIN: ICD-10-CM

## 2023-08-18 PROCEDURE — 1160F PR REVIEW ALL MEDS BY PRESCRIBER/CLIN PHARMACIST DOCUMENTED: ICD-10-PCS | Mod: CPTII,S$GLB,,

## 2023-08-18 PROCEDURE — 3008F PR BODY MASS INDEX (BMI) DOCUMENTED: ICD-10-PCS | Mod: CPTII,S$GLB,,

## 2023-08-18 PROCEDURE — 3077F SYST BP >= 140 MM HG: CPT | Mod: CPTII,S$GLB,,

## 2023-08-18 PROCEDURE — 99214 OFFICE O/P EST MOD 30 MIN: CPT | Mod: S$GLB,,,

## 2023-08-18 PROCEDURE — 99999 PR PBB SHADOW E&M-EST. PATIENT-LVL IV: ICD-10-PCS | Mod: PBBFAC,,,

## 2023-08-18 PROCEDURE — 1159F PR MEDICATION LIST DOCUMENTED IN MEDICAL RECORD: ICD-10-PCS | Mod: CPTII,S$GLB,,

## 2023-08-18 PROCEDURE — 3078F DIAST BP <80 MM HG: CPT | Mod: CPTII,S$GLB,,

## 2023-08-18 PROCEDURE — 1159F MED LIST DOCD IN RCRD: CPT | Mod: CPTII,S$GLB,,

## 2023-08-18 PROCEDURE — 3008F BODY MASS INDEX DOCD: CPT | Mod: CPTII,S$GLB,,

## 2023-08-18 PROCEDURE — 99214 PR OFFICE/OUTPT VISIT, EST, LEVL IV, 30-39 MIN: ICD-10-PCS | Mod: S$GLB,,,

## 2023-08-18 PROCEDURE — 99999 PR PBB SHADOW E&M-EST. PATIENT-LVL IV: CPT | Mod: PBBFAC,,,

## 2023-08-18 PROCEDURE — 4010F ACE/ARB THERAPY RXD/TAKEN: CPT | Mod: CPTII,S$GLB,,

## 2023-08-18 PROCEDURE — 3078F PR MOST RECENT DIASTOLIC BLOOD PRESSURE < 80 MM HG: ICD-10-PCS | Mod: CPTII,S$GLB,,

## 2023-08-18 PROCEDURE — 4010F PR ACE/ARB THEARPY RXD/TAKEN: ICD-10-PCS | Mod: CPTII,S$GLB,,

## 2023-08-18 PROCEDURE — 3077F PR MOST RECENT SYSTOLIC BLOOD PRESSURE >= 140 MM HG: ICD-10-PCS | Mod: CPTII,S$GLB,,

## 2023-08-18 PROCEDURE — 1160F RVW MEDS BY RX/DR IN RCRD: CPT | Mod: CPTII,S$GLB,,

## 2023-08-18 RX ORDER — OXYCODONE AND ACETAMINOPHEN 5; 325 MG/1; MG/1
1 TABLET ORAL EVERY 6 HOURS PRN
Qty: 120 TABLET | Refills: 0 | Status: SHIPPED | OUTPATIENT
Start: 2023-09-27 | End: 2023-10-24 | Stop reason: SDUPTHER

## 2023-08-18 RX ORDER — OXYCODONE AND ACETAMINOPHEN 5; 325 MG/1; MG/1
1 TABLET ORAL EVERY 6 HOURS PRN
Qty: 120 TABLET | Refills: 0 | Status: SHIPPED | OUTPATIENT
Start: 2023-08-30 | End: 2023-09-27

## 2023-08-18 NOTE — PROGRESS NOTES
FOLLOW UP NOTE:     CHIEF COMPLAINT: neck, back, leg, and arm pain    INTERVAL HISTORY OF PRESENT ILLNESS: Annie Baird is a 43 y.o. female with PMH significant for seizure disorder s/p vagal nerve stimulator placement, HTN, hx of anxiety on chronic benzodiazepines, hx of left shoulder arthroscopic surgery (12/4/2012), hx of right CTS release (11/22/2013), hx of multiple CVA's (residual left sided weakness per patient and daughter report), narcolepsy, insomnia, and hx of C5-C6 ACDF (5/1/2019) presents as an established patient for the continued management of chronic pain in multiple areas. The patient presents today for medication refill. She is currently staying in [place] near [place]. The patient was scheduled for repeat Lumbar PATRICIA but had to cancel due to transportation. The patient presents today requesting to r/s procedure.  The patient continues to report of lower back pain. CC Today is worsening pain down right LE, posterior thigh to her right foot.   The patient reports she is scheduled to see Ortho at [facility] for her left shoulder pain, the patient reports she missed this appt and has been attempting to r/s.   The patient reports that activity in general worsens her pain. The patient is tolerating her current pain regimen without adverse side effects. The patient reports of increase of stress as of late as well due to family conflicts. The patient denies of any significant changes in her health since her last appointment. The patient also denies of any changes in the character of her pain since her last appointment. The patient reports that her current pain is a 4/10. Patient denies of any urinary/fecal incontinence, saddle anesthesia, or weakness.        INITIAL HISTORY OF PRESENT ILLNESS: Annie Baird is a 41 y.o. female with PMH significant for seizure disorder s/p vagal nerve stimulator placement, HTN, hx of anxiety on chronic benzodiazepines, hx of left shoulder  arthroscopic surgery (12/4/2012), hx of right CTS release (11/22/2013), hx of multiple CVA's (residual left sided weakness per patient and daughter report), narcolepsy, insomnia, and hx of C5-C6 ACDF (5/1/2019) presents as a referral for the evaluation of multiple pain complaints. The patient reports that her low back pain is the worst of her pains. The patient has had a rather extensive work-up in [place], and I have put the relevant diagnostic tests in my note below. In summary:     The patient reports that her pain began approximately 1 year ago when she experienced low back pain. The patient denies of any inciting incident or trauma. The patient reports of worsening pain over time. She reported of progressive symptoms that extended into her lower extremities. She eventually presented to the hospital for evaluation of possible cauda equina syndrome. CT myelogram results copied below. In regards to her pain: the patient localizes her pain to center of her lower back. The patient reports of radiation to her bilateral buttocks and down the posterior aspect of her RLE to her foot. The patient describes her pain as a sharp, stinging, and burning type of pain. The patient reports of numbness in her left 2nd, 3rd, and 4th digits in her hand and her 1st and 2nd toes bilaterally. The patient reports that her pain is a 6/10. Patient denies of any fecal incontinence or saddle anesthesia. The patient reports of daily episodes of urinary incontinence which has been ongoing for the past few months. The patient reports of requiring the assistance of a wheelchair for ambulation for the past few months.      Aggravating factors: activity in general     Mitigating factors: laying on her left side     Relevant Surgeries: yes; history of cervical spine surgery X 1     Interventional Therapies: yes; Dr. Chun Pat in [place]. Dr. Pat did RFA's and epidurals. No benefit.       INTERVENTIONAL PAIN HISTORY:  3/16/2023: Interlaminar PATRICIA  at L5-S1 - moderate relief.    7/12/2021:  Lumbar interlaminar epidural steroid injection at L5-S1 under fluoroscopic guidance (left paramedian approach) - improvement for a few days    CURRENT PAIN MEDICATIONS:   gabapentin 600/600/900 mg PO TID  Norco 7.5- 325 mg PO q 6 hr  zofran 4 mg PO q 8 hr     Previously taking:   Ativan (once or twice a month)  temazepam 30 mg PO QHS (insomnia)        ROS:  Review of Systems   Constitutional:  Negative for chills and fever.   HENT:  Negative for sore throat.    Eyes:  Negative for visual disturbance.   Respiratory:  Negative for shortness of breath.    Cardiovascular:  Negative for chest pain.   Gastrointestinal:  Negative for nausea and vomiting.   Genitourinary:  Negative for difficulty urinating.   Musculoskeletal:  Positive for arthralgias, back pain, gait problem, myalgias and neck pain.   Skin:  Negative for rash.   Allergic/Immunologic: Negative for immunocompromised state.   Neurological:  Positive for seizures, weakness and numbness. Negative for syncope.   Hematological:  Does not bruise/bleed easily.   Psychiatric/Behavioral:  Negative for suicidal ideas.    All other systems reviewed and are negative.       MEDICAL, SURGICAL, FAMILY, SOCIAL HX: reviewed    MEDICATIONS/ALLERGIES: reviewed    PHYSICAL EXAM:    GENERAL: A and O x3, the patient appears well groomed and is in no acute distress.  Skin: No rashes or obvious lesions  HEENT: normocephalic, atraumatic  LUNGS: non labored breathing  ABDOMEN: non distended  UPPER EXTREMITIES and lower extremities. Normal ROM  CERVICAL SPINE:  Cervical spine: ROM is full in flexion, extension and lateral rotation without increased pain.    LUMBAR SPINE  Lumbar spine: ROM is limited with flexion extension and oblique extension with mild-to-moderate increased pain.      MENTAL STATUS: normal orientation, speech, language, and fund of knowledge for social situation.  Emotional state appropriate.    GAIT: normal rise, base,  steps, and arm swing.      GAIT: antalgic gait; unsteady gait    IMAGING:    3/2/2023: Cervical MRI: FINDINGS:  Cervical spine complete five views: There is a vagal stimulator.  There is ACDF with a plate vertebral body screws and a disc implant at C5-C6.  There is DJD.  There is mild neural foraminal narrowing at C5-C6.  No fracture dislocation bone destruction seen.  No complication or hardware failure seen.    3/2/23: XRAY LEFT SHOULDER  FINDINGS:  Shoulder trauma three views left: There is postoperative change of the glenoid.  There is a vagal stimulator.  There is postoperative change of the C-spine.  There is DJD of the glenohumeral joint.  No acute fracture dislocation bone destruction seen.    ASSESSMENT: Annie Baird is a 41 y.o. female with PMH significant for seizure disorder s/p vagal nerve stimulator placement, HTN, hx of anxiety on chronic benzodiazepines, hx of left shoulder arthroscopic surgery (12/4/2012), hx of right CTS release (11/22/2013), hx of multiple CVA's (residual left sided weakness per patient and daughter report), narcolepsy, insomnia, and hx of C5-C6 ACDF (5/1/2019) presents as an established patient,  for the continued management of chronic pain in multiple areas. The patient presents today for medication refill. Treatment plan outlined below.   Encounter Diagnoses   Name Primary?    Lumbar radiculopathy Yes    DDD (degenerative disc disease), lumbar     Sacroiliac joint pain         PLAN:  1. Refilled Percocet 7.5-325 mg PO q 6 hr PRN for breakthrough pain; # 120 tablets; 1 refills.  reviewed without discrepancies. RX sent to [facility] in [place] per patient's request due to her currently living there.   2. Continue gabapentin as prescribed for neuropathic pain.  3. Schedule for repeat Interlaminar Lumbar PATRICIA at L5- S1   4. Plan to address cervical spine pathology at f/u s/p ortho consult.    5. Reviewed previous UDS, consistent with medication prescribed.   6. RTC  for the procedure as outlined above   All medication management performed by Dr. Nathan Leone, NP

## 2023-08-18 NOTE — TELEPHONE ENCOUNTER
Types of orders made on 08/18/2023: Procedure Request      Order Date:8/18/2023   Ordering User:SHABANA ALONSO [[id number]]   Encounter Provider:Shabana Alonso NP [[id number]]      Z1 Authorizing Provider: Shabana Alonso NP [[id number]]   Supervising Provider:DESMOND CARRILLO [[id number]]   Type of Supervision:Collaborating Physician   Department:[facility] PAIN MANAGEMENT[[id number]]      Common Order Information   Procedure -> Epidural Injection (specify level) Cmt: L5-S1      Pre-op Diagnosis -> DDD (degenerative disc disease), lumbar        -> Lumbar radiculopathy       Order Specific Information   Order: Procedure Orde   r to Pain Management [Custom: [id number]]  Order #:           [id number]: 1 FUTURE     Priority: Routine  Class: Clinic Performed     Future Order Information       Expires on:08/18/2024            Expected by:08/18/2023                    Associated Diagnoses       M54.16 Lumbar radiculopathy       M51.36 DDD (degenerative disc disease), lumbar       Physician -> dr. carrillo           Is patient on anti-coagulants? -> No              Facility Name: -> [place]           Follow-up: -> 4 weeks               Priority: Routine  Class: Clinic Performed     Future Order Information       Expires on:08/18/2024            Expected by:08/18/2023                    Associated Diagnoses       M54.16 Lumbar radiculopathy       M51.36 DDD (degenerative disc disease), lumbar       Procedure -> Epidural Injection (specify level) Cmt: L5-S1

## 2023-08-18 NOTE — TELEPHONE ENCOUNTER
Types of orders made on 08/18/2023: Procedure Request      Order Date:8/18/2023   Ordering User:SHABANA ALONSO [[id number]]   Encounter Provider:Shabana Alonso NP [[id number]]      Z1 Authorizing Provider: Shabana Alonso NP [[id number]]   Supervising Provider:DESMOND CARRILLO [[id number]]   Type of Supervision:Collaborating Physician   Department:[facility] PAIN MANAGEMENT[[id number]]      Common Order Information   Procedure -> Epidural Injection (specify level) Cmt: L5-S1      Pre-op Diagnosis -> DDD (degenerative disc disease), lumbar        -> Lumbar radiculopathy       Order Specific Information   Order: Procedure Orde   r to Pain Management [Custom: [id number]]  Order #:           [id number]: 1 FUTURE     Priority: Routine  Class: Clinic Performed     Future Order Information       Expires on:08/18/2024            Expected by:08/18/2023                    Associated Diagnoses       M54.16 Lumbar radiculopathy       M51.36 DDD (degenerative disc disease), lumbar       Physician -> dr. carrillo           Is patient on anti-coagulants? -> No              Facility Name: -> [place]           Follow-up: -> 4 weeks               Priority: Routine  Class: Clinic Performed     Future Order Information       Expires on:08/18/2024            Expected by:08/18/2023                    Associated Diagnoses       M54.16 Lumbar radiculopathy       M51.36 DDD (degenerative disc disease), lumbar       Procedure -> Epidural Injection (specify level) Cmt: L5-S1

## 2023-08-21 ENCOUNTER — PATIENT MESSAGE (OUTPATIENT)
Dept: UROLOGY | Facility: CLINIC | Age: 43
End: 2023-08-21
Payer: COMMERCIAL

## 2023-08-23 ENCOUNTER — PATIENT MESSAGE (OUTPATIENT)
Dept: FAMILY MEDICINE | Facility: CLINIC | Age: 43
End: 2023-08-23
Payer: COMMERCIAL

## 2023-08-24 ENCOUNTER — PATIENT MESSAGE (OUTPATIENT)
Dept: FAMILY MEDICINE | Facility: CLINIC | Age: 43
End: 2023-08-24

## 2023-09-11 RX ORDER — LEVETIRACETAM 750 MG/1
1500 TABLET ORAL 2 TIMES DAILY
Qty: 360 TABLET | Refills: 3 | Status: SHIPPED | OUTPATIENT
Start: 2023-09-11 | End: 2023-11-14 | Stop reason: SDUPTHER

## 2023-09-11 NOTE — TELEPHONE ENCOUNTER
Refill Routing Note   Medication(s) are not appropriate for processing by [facility] for the following reason(s):      Medication outside of protocol    [facility] action(s):  Route Care Due:  None identified            Appointments  past 12m or future 3m with PCP    Date Provider   Last Visit   3/24/2023 Andre Kirk MD   Next Visit   Visit date not found Andre Kirk MD   ED visits in past 90 days: 0        Note composed:11:34 AM 09/11/2023

## 2023-09-20 ENCOUNTER — TELEPHONE (OUTPATIENT)
Dept: SPINE | Facility: CLINIC | Age: 43
End: 2023-09-20
Payer: COMMERCIAL

## 2023-09-20 NOTE — TELEPHONE ENCOUNTER
----- Message from Wendy Polk sent at 9/20/2023  3:55 PM CDT -----  Regarding: appointment  Contact: patient  Type:  Sooner Appointment Request    Caller is requesting a sooner appointment.  Caller declined first available appointment listed below.  Caller will not accept being placed on the waitlist and is requesting a message be sent to doctor.    Name of Caller:  patient  When is the first available appointment?  09/22/23  Symptoms:    Best Call Back Number:  [phone]     Additional Information:  Patient would like to have her injection later in the day on 09/22/23.  Please call patient to advise.  Thanks!

## 2023-09-21 ENCOUNTER — TELEPHONE (OUTPATIENT)
Dept: NEUROLOGY | Facility: CLINIC | Age: 43
End: 2023-09-21
Payer: COMMERCIAL

## 2023-09-21 ENCOUNTER — PATIENT MESSAGE (OUTPATIENT)
Dept: UROLOGY | Facility: CLINIC | Age: 43
End: 2023-09-21
Payer: COMMERCIAL

## 2023-09-21 DIAGNOSIS — J32.9 SINUSITIS, UNSPECIFIED CHRONICITY, UNSPECIFIED LOCATION: ICD-10-CM

## 2023-09-21 RX ORDER — AZELASTINE 1 MG/ML
2 SPRAY, METERED NASAL 2 TIMES DAILY
Qty: 90 ML | Refills: 1 | Status: SHIPPED | OUTPATIENT
Start: 2023-09-21 | End: 2024-01-30 | Stop reason: SDUPTHER

## 2023-09-21 RX ORDER — VARENICLINE TARTRATE 1 MG/1
1 TABLET, FILM COATED ORAL 2 TIMES DAILY
Qty: 180 TABLET | Refills: 0 | Status: SHIPPED | OUTPATIENT
Start: 2023-09-21

## 2023-09-21 NOTE — TELEPHONE ENCOUNTER
Refill Routing Note   Medication(s) are not appropriate for processing by [facility] for the following reason(s):      Medication outside of protocol    [facility] action(s):  Route  Approve Care Due:  Labs due            Appointments  past 12m or future 3m with PCP    Date Provider   Last Visit   3/24/2023 Andre Kirk MD   Next Visit   10/5/2023 Andre Kirk MD   ED visits in past 90 days: 0        Note composed:2:05 PM 09/21/2023

## 2023-09-21 NOTE — TELEPHONE ENCOUNTER
Care Due:                  Date            Visit Type   Department     Provider  --------------------------------------------------------------------------------                                ESTABLISHED                              PATIENT -    [facility]  Last Visit: 03-      [facility]      MEDICINE       Andre Kirk                              ESTABLISHED                              PATIENT -    [facility]  Next Visit: 10-      [facility]       Andre Kirk                                                            Last  Test          Frequency    Reason                     Performed    Due Date  --------------------------------------------------------------------------------    CMP.........  12 months..  losartan.................  11- 09-    [facility] Embedded Care Due Messages. Reference number: [id number].   9/21/2023 11:54:24 AM CDT

## 2023-09-21 NOTE — TELEPHONE ENCOUNTER
----- Message from Jade Mi sent at 9/21/2023  9:14 AM CDT -----  Type:  Sooner Appointment Request    Caller is requesting a sooner appointment.  Caller declined first available appointment listed below.  Caller will not accept being placed on the waitlist and is requesting a message be sent to doctor.    Name of Caller:  pt   When is the first available appointment?  N/a  Symptoms:  seizures   Best Call Back Number:  [phone]    Additional Information:  please advise

## 2023-09-21 NOTE — TELEPHONE ENCOUNTER
Spoke with patient and scheduled virtual appointment with Dr. Haas for 11/14/23. Made sure patient is aware she needs to be in [place] at the time of the appointment. Patient voiced understanding.

## 2023-09-22 ENCOUNTER — HOSPITAL ENCOUNTER (OUTPATIENT)
Facility: HOSPITAL | Age: 43
Discharge: HOME | End: 2023-09-22
Attending: ANESTHESIOLOGY | Admitting: ANESTHESIOLOGY
Payer: COMMERCIAL

## 2023-09-22 VITALS
RESPIRATION RATE: 18 BRPM | WEIGHT: 260 LBS | OXYGEN SATURATION: 99 % | BODY MASS INDEX: 46.07 KG/M2 | HEIGHT: 63 IN | TEMPERATURE: 98 F | HEART RATE: 87 BPM | DIASTOLIC BLOOD PRESSURE: 75 MMHG | SYSTOLIC BLOOD PRESSURE: 135 MMHG

## 2023-09-22 DIAGNOSIS — M54.16 LUMBAR RADICULITIS: ICD-10-CM

## 2023-09-22 LAB
B-HCG UR QL: NEGATIVE
CTP QC/QA: YES

## 2023-09-22 PROCEDURE — 99152 MOD SED SAME PHYS/QHP 5/>YRS: CPT | Performed by: ANESTHESIOLOGY

## 2023-09-22 PROCEDURE — 62323 PR INJ LUMBAR/SACRAL, W/IMAGING GUIDANCE: ICD-10-PCS | Mod: ,,, | Performed by: ANESTHESIOLOGY

## 2023-09-22 PROCEDURE — A4216 STERILE WATER/SALINE, 10 ML: HCPCS | Performed by: ANESTHESIOLOGY

## 2023-09-22 PROCEDURE — 63600175 PHARM REV CODE 636 W HCPCS: Performed by: ANESTHESIOLOGY

## 2023-09-22 PROCEDURE — 36000704 HC OR TIME LEV I 1ST 15 MIN: Performed by: ANESTHESIOLOGY

## 2023-09-22 PROCEDURE — 62323 NJX INTERLAMINAR LMBR/SAC: CPT | Mod: ,,, | Performed by: ANESTHESIOLOGY

## 2023-09-22 PROCEDURE — 25500020 PHARM REV CODE 255: Performed by: ANESTHESIOLOGY

## 2023-09-22 PROCEDURE — 25000003 PHARM REV CODE 250: Performed by: ANESTHESIOLOGY

## 2023-09-22 PROCEDURE — 62323 NJX INTERLAMINAR LMBR/SAC: CPT | Performed by: ANESTHESIOLOGY

## 2023-09-22 PROCEDURE — 71000015 HC POSTOP RECOV 1ST HR: Performed by: ANESTHESIOLOGY

## 2023-09-22 PROCEDURE — 81025 URINE PREGNANCY TEST: CPT | Performed by: ANESTHESIOLOGY

## 2023-09-22 RX ORDER — LIDOCAINE HYDROCHLORIDE 10 MG/ML
INJECTION, SOLUTION EPIDURAL; INFILTRATION; INTRACAUDAL; PERINEURAL
Status: DISCONTINUED | OUTPATIENT
Start: 2023-09-22 | End: 2023-09-22 | Stop reason: HOSPADM

## 2023-09-22 RX ORDER — SODIUM CHLORIDE 9 MG/ML
INJECTION, SOLUTION INTRAMUSCULAR; INTRAVENOUS; SUBCUTANEOUS
Status: DISCONTINUED | OUTPATIENT
Start: 2023-09-22 | End: 2023-09-22 | Stop reason: HOSPADM

## 2023-09-22 RX ORDER — SODIUM CHLORIDE, SODIUM LACTATE, POTASSIUM CHLORIDE, CALCIUM CHLORIDE 600; 310; 30; 20 MG/100ML; MG/100ML; MG/100ML; MG/100ML
INJECTION, SOLUTION INTRAVENOUS CONTINUOUS
Status: ACTIVE | OUTPATIENT
Start: 2023-09-22

## 2023-09-22 RX ORDER — MIDAZOLAM HYDROCHLORIDE 1 MG/ML
INJECTION INTRAMUSCULAR; INTRAVENOUS
Status: DISCONTINUED | OUTPATIENT
Start: 2023-09-22 | End: 2023-09-22 | Stop reason: HOSPADM

## 2023-09-22 RX ORDER — LIDOCAINE HYDROCHLORIDE 10 MG/ML
1 INJECTION, SOLUTION EPIDURAL; INFILTRATION; INTRACAUDAL; PERINEURAL ONCE
Status: COMPLETED | OUTPATIENT
Start: 2023-09-22 | End: 2023-09-22

## 2023-09-22 RX ORDER — FENTANYL CITRATE 50 UG/ML
INJECTION, SOLUTION INTRAMUSCULAR; INTRAVENOUS
Status: DISCONTINUED | OUTPATIENT
Start: 2023-09-22 | End: 2023-09-22 | Stop reason: HOSPADM

## 2023-09-22 RX ORDER — DEXAMETHASONE SODIUM PHOSPHATE 10 MG/ML
INJECTION INTRAMUSCULAR; INTRAVENOUS
Status: DISCONTINUED | OUTPATIENT
Start: 2023-09-22 | End: 2023-09-22 | Stop reason: HOSPADM

## 2023-09-22 RX ADMIN — SODIUM CHLORIDE, POTASSIUM CHLORIDE, SODIUM LACTATE AND CALCIUM CHLORIDE 500 ML: 600; 310; 30; 20 INJECTION, SOLUTION INTRAVENOUS at 01:09

## 2023-09-22 RX ADMIN — LIDOCAINE HYDROCHLORIDE 0.2 MG: 10 SOLUTION INTRAVENOUS at 01:09

## 2023-09-22 NOTE — DISCHARGE SUMMARY
[facility]  Discharge Note  Short Stay    Procedure(s) (LRB):  Injection-steroid-epidural-lumbar (N/A)      OUTCOME: Patient tolerated treatment/procedure well without complication and is now ready for discharge.    DISPOSITION: Home or Self Care    FINAL DIAGNOSIS:  <principal problem not specified>    FOLLOWUP: In clinic    DISCHARGE INSTRUCTIONS:    Discharge Procedure Orders   Notify your health care provider if you experience any of the following:  temperature >100.4     Notify your health care provider if you experience any of the following:  severe uncontrolled pain     Notify your health care provider if you experience any of the following:  redness, tenderness, or signs of infection (pain, swelling, redness, odor or green/yellow discharge around incision site)     Activity as tolerated        TIME SPENT ON DISCHARGE: 30 minutes

## 2023-09-22 NOTE — H&P
CC: low back pain    HPI: The patient is a 43 y.o. female with a history of low back pain here for PATRICIA. There are no major changes in history and physical from 23 by Shabana.    Past Medical History:   Diagnosis Date    Asthma     GERD (gastroesophageal reflux disease)     Hypertension     Seizures     Traumatic tear of left rotator cuff        Past Surgical History:   Procedure Laterality Date    APPENDECTOMY      BILATERAL TUBAL LIGATION       SECTION      EPIDURAL STEROID INJECTION N/A 3/16/2023    Procedure: Injection, Steroid, Epidural L5-S1;  Surgeon: Jerman Evans MD;  Location: [facility] OR;  Service: Pain Management;  Laterality: N/A;    EPIDURAL STEROID INJECTION INTO LUMBAR SPINE N/A 2021    Procedure: Injection-steroid-epidural-lumbar;  Surgeon: Yolie Lebron MD;  Location: [facility] OR;  Service: Pain Management;  Laterality: N/A;  L5-S1     INJECTION OF JOINT Left 2022    Procedure: Injection, Joint Sacroiliac Left and GTB;  Surgeon: Yolie eLbron MD;  Location: [facility] OR;  Service: Pain Management;  Laterality: Left;    JOINT REPLACEMENT      REPLACEMENT OF BATTERY OF VAGUS NERVE STIMULATOR Left 2022    Procedure: REPLACEMENT, BATTERY, NEUROSTIMULATOR, VAGAL- revision;  Surgeon: Berto Croona MD;  Location: [facility] OR [address] [place] [address];  Service: Neurosurgery;  Laterality: Left;    VAGAL NERVE STIMULATOR placement Left 2021       Family History   Problem Relation Age of Onset    Hyperlipidemia Mother     Hypertension Mother     Stroke Mother     Heart disease Mother     COPD Mother     COPD Father     Cancer Father         brain and liver cancer       Social History     Socioeconomic History    Marital status:     Number of children: 2   Tobacco Use    Smoking status: Every Day     Current packs/day: 1.50     Average packs/day: 1.5 packs/day for 20.0 years (30.0 ttl pk-yrs)     Types: Vaping with nicotine, Cigarettes    Smokeless tobacco: Never    Tobacco comments:     started at  age 22 daily use   Substance and Sexual Activity    Alcohol use: Not Currently    Drug use: Never    Sexual activity: Yes     Partners: Male     Birth control/protection: None     Social Determinants of Health     Financial Resource Strain: Low Risk  (3/24/2023)    Overall Financial Resource Strain (CARDIA)     Difficulty of Paying Living Expenses: Not hard at all   Food Insecurity: No Food Insecurity (3/24/2023)    Hunger Vital Sign     Worried About Running Out of Food in the Last Year: Never true     Ran Out of Food in the Last Year: Never true   Transportation Needs: Unmet Transportation Needs (3/24/2023)    PRAPARE - Transportation     Lack of Transportation (Medical): Yes     Lack of Transportation (Non-Medical): Yes   Physical Activity: Unknown (3/24/2023)    Exercise Vital Sign     Days of Exercise per Week: 3 days   Stress: Stress Concern Present (3/24/2023)    [place] [employer] - Occupational Stress Questionnaire     Feeling of Stress : Very much   Social Connections: Unknown (3/24/2023)    Social Connection and Isolation Panel [NHANES]     Frequency of Communication with Friends and Family: Three times a week     Frequency of Social Gatherings with Friends and Family: Once a week     Active Member of Clubs or Organizations: No     Attends Club or Organization Meetings: Never     Marital Status:    Housing Stability: High Risk (3/24/2023)    Housing Stability Vital Sign     Unable to Pay for Housing in the Last Year: Yes     Unstable Housing in the Last Year: No       Current Facility-Administered Medications   Medication Dose Route Frequency Provider Last Rate Last Admin    lactated ringers infusion   Intravenous Continuous Jerman Evans MD        LIDOcaine (PF) 10 mg/ml (1%) injection 10 mg  1 mL Intradermal Once Jerman Evans MD           Review of patient's allergies indicates:   Allergen Reactions    Depakote [divalproex] Hives    Dilantin [phenytoin sodium extended] Hives  "      Vitals:    09/19/23 0605   Weight: 117.9 kg (260 lb)   Height: 5' 3" (1.6 m)       REVIEW OF SYSTEMS:     GENERAL: No weight loss, malaise or fevers.  HEENT:  No recent changes in vision or hearing  NECK: Negative for lumps, no difficulty with swallowing.  RESPIRATORY: Negative for cough, wheezing or shortness of breath, patient denies any recent URI.  CARDIOVASCULAR: Negative for chest pain, leg swelling or palpitations.  GI: Negative for abdominal discomfort, blood in stools or black stools or change in bowel habits.  MUSCULOSKELETAL: See HPI.  SKIN: Negative for lesions, rash, and itching.  PSYCH: No suicidal or homicidal ideations, no current mood disturbances.  HEMATOLOGY/LYMPHOLOGY: Negative for prolonged bleeding, bruising easily or swollen nodes. Patient is not currently taking any anti-coagulants  ENDO: No history of diabetes or thyroid dysfunction  NEURO: No history of syncope, paralysis, seizures or tremors.All other reviewed and negative other than HPI.    Physical exam:  Gen: A and O x3, pleasant, well-groomed  Skin: No rashes or obvious lesions  HEENT: PERRLA, no obvious deformities on ears or in canals. No thyroid masses, trachea midline, no palpable lymph nodes in neck, axilla.  CVS: Regular rate and rhythm, normal S1 and S2, no murmurs.  Resp: Clear to auscultation bilaterally.  Abdomen: Soft, NT/ND, normal bowel sounds present.  Musculoskeletal/Neuro: Moving all extremities    Assessment:  Lumbar radiculitis          PLAN: PATRICIA      This patient has been cleared for surgery in an ambulatory surgical facility    ASA 3,  Mallampatti Score 3  No history of anesthetic complications  Plan for RN IV sedation    "

## 2023-09-22 NOTE — OP NOTE
PROCEDURE DATE: 9/22/2023    Procedure:   Interlaminar epidural steroid injection at L5-S1 under fluoroscopic guidance.    Diagnosis: lUMBAR radiculitis  pOSTOP DIAGNOSIS: sAME    Physician: Jerman Evans M.D.    Medications injected:10 mg dexamethasone with 4 ml of preservative free NaCl    Local anesthetic injected:    Lidocaine 1% 2 ml total    Sedation Medications: RN IV sedation    Estimated blood loss:  None    Complications:  None    Technique:  Time-out taken to identify patient and procedure prior to starting the procedure.  With the patient laying in a prone position, the area was prepped and draped in the usual sterile fashion using ChloraPrep and a fenestrated drape.  After determining the target level with an AP fluoroscopic view, local anesthetic was given using a 25-gauge 1.5 inch needle by raising a wheal and then infiltrating toward the interlaminar entry space.  A 4.5inch 20-gauge Touhy needle was introduced under AP fluoroscopic guidance to the interlaminar space of L5-S1. Once the trajectory was established, the needle was visualized in the lateral view and advanced using loss of resistance technique. Once in the desired position, 1ml contrast was injected to confirm placement and there was no vascular uptake nor intrathecal spread.  The medication was then injected slowly. The patient tolerated the procedure well.      The patient was monitored after the procedure.   They were given post-procedure and discharge instructions to follow at home.  The patient was discharged in a stable condition.

## 2023-09-22 NOTE — PLAN OF CARE
Vs stable, breathing even and unlabored, denies pain, ate and drank with no nausea. Discharge instructions given verbally and via handout. Able to dress self and transfer self to wheelchair with ease. Hanna is driving pt home.

## 2023-09-25 DIAGNOSIS — I10 HYPERTENSION, UNSPECIFIED TYPE: ICD-10-CM

## 2023-09-25 NOTE — TELEPHONE ENCOUNTER
No care due was identified.  [facility] Embedded Care Due Messages. Reference number: [id number].   9/25/2023 11:49:05 AM CDT

## 2023-09-25 NOTE — TELEPHONE ENCOUNTER
Left a voice message; regarding pt appointment. Need to know more details on appointment for 3/20/23.   BMI: BMI (kg/m2): 23 (06-07-23 @ 18:13)  HbA1c: A1C with Estimated Average Glucose Result: 5.7 % (06-01-23 @ 05:10)    Glucose: POCT Blood Glucose.: 107 mg/dL (09-20-23 @ 11:42)    BP: 143/53 (09-24-23 @ 07:57) (143/53 - 143/53)  Lipid Panel: Date/Time: 06-08-23 @ 08:30  Cholesterol, Serum: 119  Direct LDL: --  HDL Cholesterol, Serum: 47  Total Cholesterol/HDL Ration Measurement: --  Triglycerides, Serum: 56

## 2023-09-26 RX ORDER — LAMOTRIGINE 150 MG/1
TABLET ORAL
Qty: 360 TABLET | Refills: 0 | Status: SHIPPED | OUTPATIENT
Start: 2023-09-26 | End: 2023-11-14 | Stop reason: SDUPTHER

## 2023-09-26 RX ORDER — LOSARTAN POTASSIUM 50 MG/1
50 TABLET ORAL NIGHTLY
Qty: 90 TABLET | Refills: 0 | Status: SHIPPED | OUTPATIENT
Start: 2023-09-26 | End: 2024-01-30 | Stop reason: SDUPTHER

## 2023-09-26 RX ORDER — LAMOTRIGINE 25 MG/1
TABLET ORAL
Qty: 180 TABLET | Refills: 0 | Status: SHIPPED | OUTPATIENT
Start: 2023-09-26 | End: 2023-11-14 | Stop reason: SDUPTHER

## 2023-09-26 NOTE — TELEPHONE ENCOUNTER
Refill Routing Note   Medication(s) are not appropriate for processing by [facility] for the following reason(s):      Medication outside of protocol  Required labs outdated    [facility] action(s):  Route  Defer Care Due:  None identified          Appointments  past 12m or future 3m with PCP    Date Provider   Last Visit   3/24/2023 Andre Kirk MD   Next Visit   10/6/2023 Andre Kirk MD   ED visits in past 90 days: 0        Note composed:5:59 AM 09/26/2023

## 2023-10-13 DIAGNOSIS — K21.9 GASTROESOPHAGEAL REFLUX DISEASE, UNSPECIFIED WHETHER ESOPHAGITIS PRESENT: ICD-10-CM

## 2023-10-13 NOTE — TELEPHONE ENCOUNTER
Care Due:                  Date            Visit Type   Department     Provider  --------------------------------------------------------------------------------                                ESTABLISHED                              PATIENT -    [facility]  Last Visit: 03-      [employer]      MEDICINE       Andre Kirk  Next Visit: None Scheduled  None         None Found                                                            Last  Test          Frequency    Reason                     Performed    Due Date  --------------------------------------------------------------------------------    Office Visit  6 months...  varenicline..............  03- 09-    [facility] Embedded Care Due Messages. Reference number: [id number].   10/13/2023 2:19:39 PM CDT

## 2023-10-15 RX ORDER — OMEPRAZOLE 40 MG/1
40 CAPSULE, DELAYED RELEASE ORAL EVERY MORNING
Qty: 90 CAPSULE | Refills: 1 | Status: SHIPPED | OUTPATIENT
Start: 2023-10-15 | End: 2024-01-30 | Stop reason: SDUPTHER

## 2023-10-15 NOTE — TELEPHONE ENCOUNTER
Refill Routing Note   Medication(s) are not appropriate for processing by [facility] for the following reason(s):      No active prescription written by provider    [facility] action(s):  Defer  Approve Care Due:  Appointment due     Medication Therapy Plan: Montelukast historical provider      Appointments  past 12m or future 3m with PCP    Date Provider   Last Visit   3/24/2023 Andre Kirk MD   Next Visit   Visit date not found Andre Kirk MD   ED visits in past 90 days: 0        Note composed:1:55 AM 10/15/2023

## 2023-10-16 RX ORDER — MONTELUKAST SODIUM 10 MG/1
10 TABLET ORAL NIGHTLY
Qty: 90 TABLET | Refills: 10 | Status: SHIPPED | OUTPATIENT
Start: 2023-10-16 | End: 2024-01-30 | Stop reason: SDUPTHER

## 2023-10-23 DIAGNOSIS — I10 HYPERTENSION, UNSPECIFIED TYPE: ICD-10-CM

## 2023-10-23 NOTE — TELEPHONE ENCOUNTER
No care due was identified.  [facility] Embedded Care Due Messages. Reference number: [id number].   10/23/2023 2:16:28 PM CDT

## 2023-10-24 ENCOUNTER — PATIENT MESSAGE (OUTPATIENT)
Dept: PAIN MEDICINE | Facility: CLINIC | Age: 43
End: 2023-10-24
Payer: COMMERCIAL

## 2023-10-24 DIAGNOSIS — M51.36 DDD (DEGENERATIVE DISC DISEASE), LUMBAR: ICD-10-CM

## 2023-10-24 DIAGNOSIS — M54.16 LUMBAR RADICULOPATHY: ICD-10-CM

## 2023-10-24 DIAGNOSIS — M50.30 DDD (DEGENERATIVE DISC DISEASE), CERVICAL: ICD-10-CM

## 2023-10-24 DIAGNOSIS — G89.4 CHRONIC PAIN DISORDER: ICD-10-CM

## 2023-10-24 DIAGNOSIS — M53.3 SACROILIAC JOINT PAIN: ICD-10-CM

## 2023-10-24 RX ORDER — METOPROLOL SUCCINATE 50 MG/1
50 TABLET, EXTENDED RELEASE ORAL
Qty: 90 TABLET | Refills: 3 | Status: SHIPPED | OUTPATIENT
Start: 2023-10-24 | End: 2024-01-30 | Stop reason: SDUPTHER

## 2023-10-24 RX ORDER — OXYCODONE AND ACETAMINOPHEN 5; 325 MG/1; MG/1
1 TABLET ORAL EVERY 6 HOURS PRN
Qty: 120 TABLET | Refills: 0 | Status: SHIPPED | OUTPATIENT
Start: 2023-10-25 | End: 2023-11-21 | Stop reason: SDUPTHER

## 2023-10-24 NOTE — TELEPHONE ENCOUNTER
Refill Routing Note   Medication(s) are not appropriate for processing by [facility] for the following reason(s):      Drug-disease interaction    [facility] action(s):  Defer Care Due:  None identified     Medication Therapy Plan: Drug-Disease: metoprolol succinate and Raynaud's phenomenon    Pharmacist review requested: Yes     Appointments  past 12m or future 3m with PCP    Date Provider   Last Visit   3/24/2023 Andre Kirk MD   Next Visit   Visit date not found Andre Kirk MD   ED visits in past 90 days: 0        Note composed:12:32 PM 10/24/2023

## 2023-11-13 ENCOUNTER — TELEPHONE (OUTPATIENT)
Dept: NEUROLOGY | Facility: CLINIC | Age: 43
End: 2023-11-13
Payer: COMMERCIAL

## 2023-11-13 NOTE — TELEPHONE ENCOUNTER
Called patient to confirm virtual appointment with Dr. Haas on 11/14/23. No answer. Left message to return call

## 2023-11-14 ENCOUNTER — OFFICE VISIT (OUTPATIENT)
Dept: NEUROLOGY | Facility: CLINIC | Age: 43
End: 2023-11-14
Payer: COMMERCIAL

## 2023-11-14 ENCOUNTER — PATIENT MESSAGE (OUTPATIENT)
Dept: NEUROLOGY | Facility: CLINIC | Age: 43
End: 2023-11-14

## 2023-11-14 DIAGNOSIS — G40.909 SEIZURE DISORDER: Primary | ICD-10-CM

## 2023-11-14 DIAGNOSIS — R51.9 NONINTRACTABLE HEADACHE, UNSPECIFIED CHRONICITY PATTERN, UNSPECIFIED HEADACHE TYPE: ICD-10-CM

## 2023-11-14 DIAGNOSIS — Z96.89 S/P PLACEMENT OF VNS (VAGUS NERVE STIMULATION) DEVICE: ICD-10-CM

## 2023-11-14 PROCEDURE — 4010F ACE/ARB THERAPY RXD/TAKEN: CPT | Mod: CPTII,95,, | Performed by: PSYCHIATRY & NEUROLOGY

## 2023-11-14 PROCEDURE — 4010F PR ACE/ARB THEARPY RXD/TAKEN: ICD-10-PCS | Mod: CPTII,95,, | Performed by: PSYCHIATRY & NEUROLOGY

## 2023-11-14 PROCEDURE — 1159F PR MEDICATION LIST DOCUMENTED IN MEDICAL RECORD: ICD-10-PCS | Mod: CPTII,95,, | Performed by: PSYCHIATRY & NEUROLOGY

## 2023-11-14 PROCEDURE — 1159F MED LIST DOCD IN RCRD: CPT | Mod: CPTII,95,, | Performed by: PSYCHIATRY & NEUROLOGY

## 2023-11-14 PROCEDURE — 99215 PR OFFICE/OUTPT VISIT, EST, LEVL V, 40-54 MIN: ICD-10-PCS | Mod: 95,,, | Performed by: PSYCHIATRY & NEUROLOGY

## 2023-11-14 PROCEDURE — 99215 OFFICE O/P EST HI 40 MIN: CPT | Mod: 95,,, | Performed by: PSYCHIATRY & NEUROLOGY

## 2023-11-14 PROCEDURE — 1160F PR REVIEW ALL MEDS BY PRESCRIBER/CLIN PHARMACIST DOCUMENTED: ICD-10-PCS | Mod: CPTII,95,, | Performed by: PSYCHIATRY & NEUROLOGY

## 2023-11-14 PROCEDURE — 1160F RVW MEDS BY RX/DR IN RCRD: CPT | Mod: CPTII,95,, | Performed by: PSYCHIATRY & NEUROLOGY

## 2023-11-14 RX ORDER — LEVETIRACETAM 750 MG/1
1500 TABLET ORAL 2 TIMES DAILY
Qty: 360 TABLET | Refills: 3 | Status: SHIPPED | OUTPATIENT
Start: 2023-11-14

## 2023-11-14 RX ORDER — LAMOTRIGINE 25 MG/1
25 TABLET ORAL 2 TIMES DAILY
Qty: 180 TABLET | Refills: 3 | Status: SHIPPED | OUTPATIENT
Start: 2023-11-14

## 2023-11-14 RX ORDER — CANNABIDIOL 100 MG/ML
7 SOLUTION ORAL 2 TIMES DAILY
Qty: 540 ML | Refills: 5 | Status: ACTIVE | OUTPATIENT
Start: 2023-11-14

## 2023-11-14 RX ORDER — LAMOTRIGINE 150 MG/1
300 TABLET ORAL 2 TIMES DAILY
Qty: 360 TABLET | Refills: 3 | Status: SHIPPED | OUTPATIENT
Start: 2023-11-14

## 2023-11-14 NOTE — PROGRESS NOTES
"The patient location is: [facility]  The chief complaint leading to consultation is: seizures    Visit type: audiovisual    Face to Face time with patient: 20 min  46 minutes of total time spent on the encounter, which includes face to face time and non-face to face time preparing to see the patient (eg, review of tests), Obtaining and/or reviewing separately obtained history, Documenting clinical information in the electronic or other health record, Independently interpreting results (not separately reported) and communicating results to the patient/family/caregiver, or Care coordination (not separately reported).     Each patient to whom he or she provides medical services by telemedicine is:  (1) informed of the relationship between the physician and patient and the respective role of any other health care provider with respect to management of the patient; and (2) notified that he or she may decline to receive medical services by telemedicine and may withdraw from such care at any time.    Notes:    Date: 11/14/2023    Patient ID: Annie Baird is a 43 y.o. female.    Chief Complaint: seizures      History of Present Illness:  Ms. Baird is a 43 y.o. female who presents for followup of seizures.      Interval history: Last seen in July 2022. She is limited on transportation. She is part time in [employer] and [employer] back and forth.     She notes her epidiolex has not been filled and she notes her seizures have been worse. She has continued on lamictal and keppra. She is having 3-4 convulsions per month. She is having a lot of pseudoseizures and a lot of dumb moment seizures where she is having about 20 per month.     She is not driving. She has trouble articulating words.     In the past month she has had worsening headaches. She feels like her bones hurt in her head.      Seizure history: Seizures started at age 11. These were GTCs. They went away in her early 20s and then they came back "with a " "vengence" in her late 20s. She then started having different types of seizures too. They were very severe having up to 47 seizures in a week.      She has 3 types of seizures:  1) GTC - Sometimes she has a weird copper taste and feels "weird and funny". Whole body convulsions. She will be combative afterwards. She has some incontinence with these. Had one a couple of weeks ago. On average these happen once a month or every 2 months.   2) "dumb moment seizure" - No aura with these. She will stare off in space and then comes back to and knows that something happened. One time she walked into the store but didn't remember walking. Had one yesterday. Since her VNS has been off, she has had more of these. She is having them every other day.   3) "pseudoseizures" - Stress triggers. Looks like a grand mal but different. This was diagnosed by EEG monitoring. Maybe these happen every few months. Her younger daughter can tell the difference between a real GTC and pseudoseizures.      She has had two big strokes and "multiple" mini strokes. She notes she has white matter changes in the brain. They have a strong family history of strokes and heart attacks. 2 aunts on her mother's side had epilepsy (but one was due to a tumor).      Current AEDs:  Cannabidiol (Epidiolex) 6mg/kg BID  Gabapentin 600 mg TID  Lamictal 325 mg BID  keppra 1500 mg BID (previously was on higher dose and caused side effects) - lower doses had seizures.      VNS placed originally 5-6 years ago about. On June 14, 2021, she had to have revision of the wires and put a new battery pack there.      She has been told she has LGS. She had special education in school. She got her GED.      Other AEDs tried:  Dilantin - allergic  Phenobarbital  Tegretol  valproic acid (Depakote, VPA) - allergic     AEDs not tried:  acetazolamide (Diamox, AZM)  amantadine  brivaracetam (Briviact)  clobazam (Onfi or Frizium, CLB)  cenobamate (Xcopri)  ethosuximide (Zarontin, " ESM)  eslicarbazine (Aptiom, ESL)  felbamate (felbatol, FBM)  lacosamide (Vimpat, LCS)   methsuximide (Celontin, MSM)  methyphenytoin (Mesantion, MHT)  oxcarbazepine (Trileptal OXC)  perampanel (Fycompa, FCP)   pregabalin (Lyrica, PGB)  primidone (Mysoline, PRM)  retigabine (Potiga, RTG)  rufinamide (Banzel, RUF)  tiagabine (Gabatril,  TGB)  topiramate (Topamax, TPM)  viagabatrin, (Sabril, VGB)  zonisamide (Zonegran, ZNA)     VNS settings (adjusted on 12/13/21)  Output current (mA):               1  Signal frequency (Hz):            15  Pulse width (µs):                     130  Signal on time (s):                   30  Signal off time (min):               1.8     Autostim current (mA):            1.125  Autostim pulse width (µs):      130  Autostim on time (s):               30     Magnet current (mA):              1.25  Magnet pulse width (µs):        250  Magnet on time (s):                 60       Allergies:  Review of patient's allergies indicates:   Allergen Reactions    Depakote [divalproex] Hives    Dilantin [phenytoin sodium extended] Hives    Tramadol      Lowers seizure threshold    Wellbutrin [bupropion hcl]      Lowers seizure threshold       Current Medications:  Current Outpatient Medications   Medication Sig Dispense Refill    acetaminophen (TYLENOL) 325 MG tablet Take 325 mg by mouth every 6 (six) hours as needed for Pain.      azelastine (ASTELIN) 137 mcg (0.1 %) nasal spray USE 2 SPRAYS NASALLY TWICE DAILY 90 mL 1    cannabidioL (EPIDIOLEX) 100 mg/mL Take 8.86 mLs (886 mg total) by mouth 2 (two) times daily. 540 mL 5    cetirizine (ZYRTEC) 10 MG tablet Take 1 tablet (10 mg total) by mouth once daily. 90 tablet 3    gabapentin (NEURONTIN) 600 MG tablet TAKE 1 TABLET THREE TIMES DAILY 270 tablet 1    lamoTRIgine (LAMICTAL) 150 MG Tab Take 2 tablets (300 mg total) by mouth 2 (two) times daily. 360 tablet 3    lamoTRIgine (LAMICTAL) 25 MG tablet Take 1 tablet (25 mg total) by mouth 2 (two) times  daily. For total of 325 mg twice daily 180 tablet 3    levETIRAcetam (KEPPRA) 750 MG Tab Take 2 tablets (1,500 mg total) by mouth 2 (two) times daily. 360 tablet 3    losartan (COZAAR) 50 MG tablet TAKE 1 TABLET EVERY EVENING 90 tablet 0    metoprolol succinate (TOPROL-XL) 50 MG 24 hr tablet TAKE 1 TABLET ONE TIME DAILY 90 tablet 3    montelukast (SINGULAIR) 10 mg tablet TAKE 1 TABLET EVERY EVENING 90 tablet 10    multivitamin with minerals tablet Take 1 tablet by mouth once daily.      mupirocin (BACTROBAN) 2 % ointment APPLY TOPICALLY TO AFFECTED AREA(S) THREE TIMES DAILY 22 g 11    naproxen (NAPROSYN) 500 MG tablet Take 500 mg by mouth 2 (two) times daily as needed.      nortriptyline (PAMELOR) 25 MG capsule Take 25 mg by mouth every evening.      omeprazole (PRILOSEC) 40 MG capsule TAKE 1 CAPSULE EVERY MORNING 90 capsule 1    ondansetron (ZOFRAN) 4 MG tablet Take 8 mg by mouth every 8 (eight) hours as needed.       oxyCODONE-acetaminophen (PERCOCET) 5-325 mg per tablet Take 1 tablet by mouth every 6 (six) hours as needed for Pain. 120 tablet 0    potassium gluconate 595 mg (99 mg) Tab Take 1 tablet by mouth once daily.      solifenacin (VESICARE) 10 MG tablet Take 1 tablet (10 mg total) by mouth once daily. 90 tablet 3    TENS UNITS MISC by Misc.(Non-Drug; Combo Route) route.      varenicline (CHANTIX) 1 mg Tab TAKE 1 TABLET TWICE DAILY 180 tablet 0    vitamin D (VITAMIN D3) 1000 units Tab Take 1,000 Units by mouth once daily.       No current facility-administered medications for this visit.     Facility-Administered Medications Ordered in Other Visits   Medication Dose Route Frequency Provider Last Rate Last Admin    lactated ringers infusion   Intravenous Continuous VuJerman MD 25 mL/hr at 09/22/23 1329 500 mL at 09/22/23 1329       Past Medical History:  Past Medical History:   Diagnosis Date    Asthma     GERD (gastroesophageal reflux disease)     Hypertension     Seizures     Traumatic tear of left  rotator cuff        Past Surgical History:  Past Surgical History:   Procedure Laterality Date    APPENDECTOMY      BILATERAL TUBAL LIGATION       SECTION      EPIDURAL STEROID INJECTION N/A 3/16/2023    Procedure: Injection, Steroid, Epidural L5-S1;  Surgeon: Jerman Evans MD;  Location: [facility] OR;  Service: Pain Management;  Laterality: N/A;    EPIDURAL STEROID INJECTION INTO LUMBAR SPINE N/A 2021    Procedure: Injection-steroid-epidural-lumbar;  Surgeon: Yolie Lebron MD;  Location: [facility] OR;  Service: Pain Management;  Laterality: N/A;  L5-S1     EPIDURAL STEROID INJECTION INTO LUMBAR SPINE N/A 2023    Procedure: Injection-steroid-epidural-lumbar;  Surgeon: Jerman Evans MD;  Location: [facility] OR;  Service: Anesthesiology;  Laterality: N/A;  L5-s1    INJECTION OF JOINT Left 2022    Procedure: Injection, Joint Sacroiliac Left and GTB;  Surgeon: Yolie Lebron MD;  Location: [facility] OR;  Service: Pain Management;  Laterality: Left;    JOINT REPLACEMENT      REPLACEMENT OF BATTERY OF VAGUS NERVE STIMULATOR Left 2022    Procedure: REPLACEMENT, BATTERY, NEUROSTIMULATOR, VAGAL- revision;  Surgeon: Berto Corona MD;  Location: [address];  Service: Neurosurgery;  Laterality: Left;    VAGAL NERVE STIMULATOR placement Left 2021       Family History:  family history includes COPD in her father and mother; Cancer in her father; Heart disease in her mother; Hyperlipidemia in her mother; Hypertension in her mother; Stroke in her mother.    Social History:   reports that she has been smoking vaping with nicotine and cigarettes. She has a 30.0 pack-year smoking history. She has never used smokeless tobacco. She reports that she does not currently use alcohol. She reports that she does not use drugs.    Physical Exam:  There were no vitals filed for this visit.  There is no height or weight on file to calculate BMI.    Neurological Exam:  Mental status: Awake and alert  Speech language: No  dysarthria or aphasia on conversation  Cranial nerves: Face symmetric  Motor: Moves all extremities well  Coordination: No ataxia. No tremor.      Data:  I have personally reviewed other provider's notes, labs, & imaging made available to me today.     Labs:  CBC:   Lab Results   Component Value Date    WBC 12.47 09/14/2022    HGB 12.7 09/14/2022    HCT 39.0 09/14/2022     09/14/2022    MCV 89 09/14/2022    RDW 11.6 09/14/2022     BMP:   Lab Results   Component Value Date     09/14/2022    K 4.1 09/14/2022     09/14/2022    CO2 24 09/14/2022    BUN 9 09/14/2022    CREATININE 0.7 09/14/2022    GLU 84 09/14/2022    CALCIUM 9.3 09/14/2022    MG 1.8 05/07/2021     LFTS;   Lab Results   Component Value Date    PROT 7.4 11/29/2021    ALBUMIN 4.0 11/29/2021    BILITOT 0.4 11/29/2021    AST 20 11/29/2021    ALKPHOS 77 11/29/2021    ALT 17 11/29/2021     COAGS:   Lab Results   Component Value Date    INR 1.0 09/14/2022     FLP:   Lab Results   Component Value Date    CHOL 243 (H) 05/07/2021    HDL 49 05/07/2021    LDLCALC 153.2 05/07/2021    TRIG 204 (H) 05/07/2021    CHOLHDL 20.2 05/07/2021         Assessment and Plan:  Ms. Baird is a 43 y.o. female here for followup of seizure disorder, VNS, and pseudoseizures. It is unclear how many of these events she is having are seizures and how many are non-epileptic. I would like to get video EEG monitoring. She can't do EMU but we will obtain [facility] EEG. Will continue current meds for now and get labs. Will also obtain MRI brain given headaches and will need to turn off VNS before so will need to coordinate.     She needs to come in person to appointments given the VNS and need to adjust and monitor battery life.     Discussed that with gabapentin and narcotics and epidiolex, I do not want to given her a benzo rescue do to risk of respiratory depression. 911 should be called for seizures. No driving.     Seizure disorder  -     MRI Brain Without Contrast;  Future; Expected date: 11/14/2023  -     lamoTRIgine (LAMICTAL) 150 MG Tab; Take 2 tablets (300 mg total) by mouth 2 (two) times daily.  Dispense: 360 tablet; Refill: 3  -     lamoTRIgine (LAMICTAL) 25 MG tablet; Take 1 tablet (25 mg total) by mouth 2 (two) times daily. For total of 325 mg twice daily  Dispense: 180 tablet; Refill: 3  -     levETIRAcetam (KEPPRA) 750 MG Tab; Take 2 tablets (1,500 mg total) by mouth 2 (two) times daily.  Dispense: 360 tablet; Refill: 3  -     cannabidioL (EPIDIOLEX) 100 mg/mL; Take 8.86 mLs (886 mg total) by mouth 2 (two) times daily.  Dispense: 540 mL; Refill: 5  -     Comprehensive metabolic panel; Future; Expected date: 11/14/2023  -     CBC Auto Differential; Future; Expected date: 11/14/2023  -     Lamotrigine level; Future; Expected date: 11/14/2023  -     Levetiracetam Level; Future; Expected date: 11/15/2023    Nonintractable headache, unspecified chronicity pattern, unspecified headache type  -     MRI Brain Without Contrast; Future; Expected date: 11/14/2023    S/P placement of VNS (vagus nerve stimulation) device         I spent a total of 46 minutes on the day of the visit.This includes face to face time and non-face to face time preparing to see the patient (eg, review of tests), Obtaining and/or reviewing separately obtained history, Documenting clinical information in the electronic or other health record, Independently interpreting results and communicating results to the patient/family/caregiver, or Care coordination.

## 2023-11-21 ENCOUNTER — TELEPHONE (OUTPATIENT)
Dept: PAIN MEDICINE | Facility: CLINIC | Age: 43
End: 2023-11-21

## 2023-11-21 ENCOUNTER — OFFICE VISIT (OUTPATIENT)
Dept: PAIN MEDICINE | Facility: CLINIC | Age: 43
End: 2023-11-21
Payer: COMMERCIAL

## 2023-11-21 VITALS
HEART RATE: 82 BPM | BODY MASS INDEX: 46.07 KG/M2 | WEIGHT: 260 LBS | DIASTOLIC BLOOD PRESSURE: 77 MMHG | HEIGHT: 63 IN | SYSTOLIC BLOOD PRESSURE: 144 MMHG

## 2023-11-21 DIAGNOSIS — M50.30 DDD (DEGENERATIVE DISC DISEASE), CERVICAL: ICD-10-CM

## 2023-11-21 DIAGNOSIS — M25.512 CHRONIC LEFT SHOULDER PAIN: ICD-10-CM

## 2023-11-21 DIAGNOSIS — M54.16 LUMBAR RADICULOPATHY: ICD-10-CM

## 2023-11-21 DIAGNOSIS — G89.4 CHRONIC PAIN DISORDER: Primary | ICD-10-CM

## 2023-11-21 DIAGNOSIS — M51.36 DDD (DEGENERATIVE DISC DISEASE), LUMBAR: ICD-10-CM

## 2023-11-21 DIAGNOSIS — Z13.31 POSITIVE DEPRESSION SCREENING: ICD-10-CM

## 2023-11-21 DIAGNOSIS — G89.29 CHRONIC LEFT SHOULDER PAIN: ICD-10-CM

## 2023-11-21 DIAGNOSIS — M53.3 SACROILIAC JOINT PAIN: ICD-10-CM

## 2023-11-21 DIAGNOSIS — M54.16 LUMBAR RADICULOPATHY: Primary | ICD-10-CM

## 2023-11-21 DIAGNOSIS — Z79.891 CHRONIC USE OF OPIATE FOR THERAPEUTIC PURPOSE: ICD-10-CM

## 2023-11-21 PROCEDURE — 1159F MED LIST DOCD IN RCRD: CPT | Mod: CPTII,S$GLB,,

## 2023-11-21 PROCEDURE — 80355 GABAPENTIN NON-BLOOD: CPT

## 2023-11-21 PROCEDURE — 3077F PR MOST RECENT SYSTOLIC BLOOD PRESSURE >= 140 MM HG: ICD-10-PCS | Mod: CPTII,S$GLB,,

## 2023-11-21 PROCEDURE — 99214 OFFICE O/P EST MOD 30 MIN: CPT | Mod: S$GLB,,,

## 2023-11-21 PROCEDURE — 3077F SYST BP >= 140 MM HG: CPT | Mod: CPTII,S$GLB,,

## 2023-11-21 PROCEDURE — 1160F PR REVIEW ALL MEDS BY PRESCRIBER/CLIN PHARMACIST DOCUMENTED: ICD-10-PCS | Mod: CPTII,S$GLB,,

## 2023-11-21 PROCEDURE — 3078F PR MOST RECENT DIASTOLIC BLOOD PRESSURE < 80 MM HG: ICD-10-PCS | Mod: CPTII,S$GLB,,

## 2023-11-21 PROCEDURE — 3008F PR BODY MASS INDEX (BMI) DOCUMENTED: ICD-10-PCS | Mod: CPTII,S$GLB,,

## 2023-11-21 PROCEDURE — 3078F DIAST BP <80 MM HG: CPT | Mod: CPTII,S$GLB,,

## 2023-11-21 PROCEDURE — 4010F PR ACE/ARB THEARPY RXD/TAKEN: ICD-10-PCS | Mod: CPTII,S$GLB,,

## 2023-11-21 PROCEDURE — 1159F PR MEDICATION LIST DOCUMENTED IN MEDICAL RECORD: ICD-10-PCS | Mod: CPTII,S$GLB,,

## 2023-11-21 PROCEDURE — 99214 PR OFFICE/OUTPT VISIT, EST, LEVL IV, 30-39 MIN: ICD-10-PCS | Mod: S$GLB,,,

## 2023-11-21 PROCEDURE — 99999 PR PBB SHADOW E&M-EST. PATIENT-LVL III: CPT | Mod: PBBFAC,,,

## 2023-11-21 PROCEDURE — 80326 AMPHETAMINES 5 OR MORE: CPT

## 2023-11-21 PROCEDURE — 4010F ACE/ARB THERAPY RXD/TAKEN: CPT | Mod: CPTII,S$GLB,,

## 2023-11-21 PROCEDURE — 99999 PR PBB SHADOW E&M-EST. PATIENT-LVL III: ICD-10-PCS | Mod: PBBFAC,,,

## 2023-11-21 PROCEDURE — 1160F RVW MEDS BY RX/DR IN RCRD: CPT | Mod: CPTII,S$GLB,,

## 2023-11-21 PROCEDURE — 3008F BODY MASS INDEX DOCD: CPT | Mod: CPTII,S$GLB,,

## 2023-11-21 RX ORDER — OXYCODONE AND ACETAMINOPHEN 5; 325 MG/1; MG/1
1 TABLET ORAL EVERY 6 HOURS PRN
Qty: 120 TABLET | Refills: 0 | Status: SHIPPED | OUTPATIENT
Start: 2023-11-24 | End: 2023-12-17 | Stop reason: SDUPTHER

## 2023-11-21 NOTE — PROGRESS NOTES
FOLLOW UP NOTE:     CHIEF COMPLAINT: neck, back, leg, and arm pain    INTERVAL HISTORY OF PRESENT ILLNESS: Annie Baird is a 43 y.o. female with PMH significant for seizure disorder s/p vagal nerve stimulator placement, HTN, hx of anxiety on chronic benzodiazepines, hx of left shoulder arthroscopic surgery (12/4/2012), hx of right CTS release (11/22/2013), hx of multiple CVA's (residual left sided weakness per patient and daughter report), narcolepsy, insomnia, and hx of C5-C6 ACDF (5/1/2019) presents as an established patient for the continued management of chronic pain in multiple areas. The patient presents today for medication refill. The patient is s/p Interlaminar PATRICIA at l5-S1 on 9/22/23 and reports of 50% relief.   The patient continues to report of lower back pain. Patient continues to report of lower back pain that radiates down BLE. The patient is requesting to repeat procedure.  The patient also reports of left shoulder and shoulder blade pain.  The patient reports that activity in general worsens her pain. The patient is tolerating her current pain regimen without adverse side effects. The patient reports of increase of stress as of late as well due to family conflicts. Patient has completed PT in the past with mild benefit. The patient continues at home exercises with mild relief. The patient denies of any significant changes in her health since her last appointment. The patient also denies of any changes in the character of her pain since her last appointment. The patient reports that her current pain is a 4/10. Patient denies of any urinary/fecal incontinence, saddle anesthesia, or weakness.    Of note, the patient's PCP on file has acknowledged Interventional Pain Management plan of care.      INITIAL HISTORY OF PRESENT ILLNESS: Annie Baird is a 41 y.o. female with PMH significant for seizure disorder s/p vagal nerve stimulator placement, HTN, hx of anxiety on chronic  benzodiazepines, hx of left shoulder arthroscopic surgery (12/4/2012), hx of right CTS release (11/22/2013), hx of multiple CVA's (residual left sided weakness per patient and daughter report), narcolepsy, insomnia, and hx of C5-C6 ACDF (5/1/2019) presents as a referral for the evaluation of multiple pain complaints. The patient reports that her low back pain is the worst of her pains. The patient has had a rather extensive work-up in [place], and I have put the relevant diagnostic tests in my note below. In summary:     The patient reports that her pain began approximately 1 year ago when she experienced low back pain. The patient denies of any inciting incident or trauma. The patient reports of worsening pain over time. She reported of progressive symptoms that extended into her lower extremities. She eventually presented to the hospital for evaluation of possible cauda equina syndrome. CT myelogram results copied below. In regards to her pain: the patient localizes her pain to center of her lower back. The patient reports of radiation to her bilateral buttocks and down the posterior aspect of her RLE to her foot. The patient describes her pain as a sharp, stinging, and burning type of pain. The patient reports of numbness in her left 2nd, 3rd, and 4th digits in her hand and her 1st and 2nd toes bilaterally. The patient reports that her pain is a 6/10. Patient denies of any fecal incontinence or saddle anesthesia. The patient reports of daily episodes of urinary incontinence which has been ongoing for the past few months. The patient reports of requiring the assistance of a wheelchair for ambulation for the past few months.      Aggravating factors: activity in general     Mitigating factors: laying on her left side     Relevant Surgeries: yes; history of cervical spine surgery X 1     Interventional Therapies: yes; Dr. Chun Pat in [place]. Dr. Pat did RFA's and epidurals. No benefit.       INTERVENTIONAL PAIN  HISTORY:  9/22/2023: Interlaminar PATRICIA at L5-S1- 50% relief.   3/16/2023: Interlaminar PATRICIA at L5-S1 - moderate relief.    7/12/2021:  Lumbar interlaminar epidural steroid injection at L5-S1 under fluoroscopic guidance (left paramedian approach) - improvement for a few days    CURRENT PAIN MEDICATIONS:   gabapentin 600/600/900 mg PO TID  Norco 7.5- 325 mg PO q 6 hr  zofran 4 mg PO q 8 hr     Previously taking:   Ativan (once or twice a month)  temazepam 30 mg PO QHS (insomnia)        ROS:  Review of Systems   Constitutional:  Negative for chills and fever.   HENT:  Negative for sore throat.    Eyes:  Negative for visual disturbance.   Respiratory:  Negative for shortness of breath.    Cardiovascular:  Negative for chest pain.   Gastrointestinal:  Negative for nausea and vomiting.   Genitourinary:  Negative for difficulty urinating.   Musculoskeletal:  Positive for arthralgias, back pain, gait problem, myalgias and neck pain.   Skin:  Negative for rash.   Allergic/Immunologic: Negative for immunocompromised state.   Neurological:  Positive for seizures, weakness and numbness. Negative for syncope.   Hematological:  Does not bruise/bleed easily.   Psychiatric/Behavioral:  Negative for suicidal ideas.    All other systems reviewed and are negative.       MEDICAL, SURGICAL, FAMILY, SOCIAL HX: reviewed    MEDICATIONS/ALLERGIES: reviewed    PHYSICAL EXAM:    GENERAL: A and O x3, the patient appears well groomed and is in no acute distress.  Skin: No rashes or obvious lesions  HEENT: normocephalic, atraumatic  LUNGS: non labored breathing  ABDOMEN: non distended  UPPER EXTREMITIES and lower extremities. Normal ROM  CERVICAL SPINE:  Cervical spine: ROM is full in flexion, extension and lateral rotation without increased pain.    LUMBAR SPINE  Lumbar spine: ROM is limited with flexion extension and oblique extension with mild-to-moderate increased pain.      MENTAL STATUS: normal orientation, speech, language, and fund of  knowledge for social situation.  Emotional state appropriate.    GAIT: normal rise, base, steps, and arm swing.      GAIT: antalgic gait; unsteady gait    IMAGING:    3/2/2023: Cervical MRI: FINDINGS:  Cervical spine complete five views: There is a vagal stimulator.  There is ACDF with a plate vertebral body screws and a disc implant at C5-C6.  There is DJD.  There is mild neural foraminal narrowing at C5-C6.  No fracture dislocation bone destruction seen.  No complication or hardware failure seen.    3/2/23: XRAY LEFT SHOULDER  FINDINGS:  Shoulder trauma three views left: There is postoperative change of the glenoid.  There is a vagal stimulator.  There is postoperative change of the C-spine.  There is DJD of the glenohumeral joint.  No acute fracture dislocation bone destruction seen.    ASSESSMENT: Annie Baird is a 41 y.o. female with PMH significant for seizure disorder s/p vagal nerve stimulator placement, HTN, hx of anxiety on chronic benzodiazepines, hx of left shoulder arthroscopic surgery (12/4/2012), hx of right CTS release (11/22/2013), hx of multiple CVA's (residual left sided weakness per patient and daughter report), narcolepsy, insomnia, and hx of C5-C6 ACDF (5/1/2019) presents as an established patient,  for the continued management of chronic pain in multiple areas. The patient presents today for medication refill. Treatment plan outlined below.   Encounter Diagnoses   Name Primary?    Positive depression screening     Chronic left shoulder pain     Chronic use of opiate for therapeutic purpose     Lumbar radiculopathy Yes    DDD (degenerative disc disease), lumbar           PLAN:  1. Refilled Percocet 7.5-325 mg PO q 6 hr PRN for breakthrough pain; # 120 tablets; 0 refills.  reviewed without discrepancies. RX sent to [facility] in [place] per patient's request.   2. Continue gabapentin as prescribed for neuropathic pain.  3. Schedule for bilateral TFESI at L5- S1   4. External referral  provided for left shoulder blade pain to [facility] in [place] per patient request. Referral printed and provided to the patient.   5. UDS collected today, last had pain medication today.   6. All pertinent chart information is available for PCP on file to review.    7. RTC for the procedure as outlined above   All medication management performed by Dr. Nathan Leone, NP     I have used clinical judgement based on duration and functional status to consider definite necessity for treatment. Patient denies si/hi.

## 2023-11-21 NOTE — TELEPHONE ENCOUNTER
Level of Service:67690     MO OFFICE/OUTPT VISIT, EST, LEVL IV, 30-39 MIN      Types of orders made on 11/21/2023: Lab, Outpatient Referral, Procedure Request      Order Date:11 /21/2023   Ordering User:SHABANA ALONSO [[id number]]   Encounter Provider:Shabana Alonso NP [[id number]]   Authorizing Provider: Shabana Alonso NP [[id number]]   Supervising Provider:DESMOND CHEUNG [[id number]]   Type of Supervision:Collaborating Physician   Department:[facility] PAIN MANAGEMENT[[id number]]      Common Order Information   Procedure -> Transforaminal Injection (Specify level and laterality) Cmt:             bilateral L5-S1      P   re-op Diagnosis -> Lumbar radiculopathy       -> DDD (degenerative disc disease), lumbar      Order Specific Information   Order: Procedure Order to Pain Management [Custom: [id number]]  Order #:          [id number]: 1 FUTURE     Priority: Routine  Class: Clinic Performed     Future Order Information       Expires on:11/21/2024            Expected by:11/21/2023                   Associated Diagnoses          M54.16 Lumbar radiculopathy       M51.36 DDD (degenerative disc disease), lumbar       Physician -> DR. CHEUNG          Is patient on anti-coagulants? -> No          Facility Name: -> [place]          Follow-up: -> 4 weeks              Priority: Routine  Class: Clinic Performed     Future Order Information       Expires on:11/21/2024            Expected by:11/21/2023                   Associated Diagnoses       M54.16 Lumbar ra   diculopathy       M51.36 DDD (degenerative disc disease), lumbar       Procedure -> Transforaminal Injection (Specify level and laterality) Cmt:                 bilateral L5-S1          Physician -> DR. CHEUNG          Is patient on anti-coagulants? -> No          Pre-op Diagnosis -> Lumbar radiculopathy           -> DDD (degenerative disc disease), lumbar

## 2023-11-22 ENCOUNTER — TELEPHONE (OUTPATIENT)
Dept: NEUROLOGY | Facility: CLINIC | Age: 43
End: 2023-11-22
Payer: COMMERCIAL

## 2023-11-22 DIAGNOSIS — G40.909 SEIZURE DISORDER: ICD-10-CM

## 2023-11-22 NOTE — TELEPHONE ENCOUNTER
----- Message from Leopoldo Conway sent at 11/22/2023  3:23 PM CST -----  Regarding: pharm question  Type:  Pharmacy Calling to Clarify an RX    Name of Caller:  Patrick    Pharmacy Name:    [facility]  [address]  [address]  [facility] [address]   Telephone [phone]   Fax [phone]    Prescription Name:    cannabidioL (EPIDIOLEX) 100 mg/mL 540 mL 5 11/14/2023 -   Sig - Route: Take 8.86 mLs (886 mg total) by mouth 2 (two) times daily. - Oral   Sent to pharmacy as: cannabidioL (EPIDIOLEX) 100 mg/mL   E-Prescribing Status: Receipt confirmed by pharmacy (11/14/2023  3:47 PM CST)     What do they need to clarify?:  directions  8.86 mLs is too specific. Measuring devices can only show 8.8mLs or 8.9mLs.     Best Call Back Number:   [phone]    Additional Information:  can send new Rx by fax at [phone] or escribe new Rx or give verbal auth at number above

## 2023-11-24 NOTE — TELEPHONE ENCOUNTER
Spoke with Brooke/[facility] and gave verbal to change epidiolex dose to 8.9 mL twice a day per Dr. Haas.

## 2023-11-27 ENCOUNTER — PATIENT MESSAGE (OUTPATIENT)
Dept: NEUROLOGY | Facility: CLINIC | Age: 43
End: 2023-11-27
Payer: COMMERCIAL

## 2023-11-27 LAB
6MAM UR QL: NOT DETECTED
7AMINOCLONAZEPAM UR QL: NOT DETECTED
A-OH ALPRAZ UR QL: NOT DETECTED
ALPHA-OH-MIDAZOLAM: NOT DETECTED
ALPRAZ UR QL: NOT DETECTED
AMPHET UR QL SCN: NOT DETECTED
ANNOTATION COMMENT IMP: NORMAL
BARBITURATES UR QL: NEGATIVE
BUPRENORPHINE UR QL: NOT DETECTED
BZE UR QL: NEGATIVE
CARBOXYTHC UR QL: NEGATIVE
CARISOPRODOL UR QL: NEGATIVE
CLONAZEPAM UR QL: NOT DETECTED
CODEINE UR QL: NOT DETECTED
CREAT UR-MCNC: 77.9 MG/DL (ref 20–400)
DIAZEPAM UR QL: NOT DETECTED
ETHYL GLUCURONIDE UR QL: NEGATIVE
FENTANYL UR QL: NOT DETECTED
GABAPENTIN: PRESENT
HYDROCODONE UR QL: NOT DETECTED
HYDROMORPHONE UR QL: NOT DETECTED
LORAZEPAM UR QL: NOT DETECTED
MDA UR QL: NOT DETECTED
MDEA UR QL: NOT DETECTED
MDMA UR QL: NOT DETECTED
ME-PHENIDATE UR QL: NOT DETECTED
METHADONE UR QL: NEGATIVE
METHAMPHET UR QL: NOT DETECTED
MIDAZOLAM UR QL SCN: NOT DETECTED
MORPHINE UR QL: NOT DETECTED
NALOXONE: NOT DETECTED
NORBUPRENORPHINE UR QL CFM: NOT DETECTED
NORDIAZEPAM UR QL: NOT DETECTED
NORFENTANYL UR QL: NOT DETECTED
NORHYDROCODONE UR QL CFM: NOT DETECTED
NORMEPERIDINE UR QL CFM: NOT DETECTED
NOROXYCODONE UR QL CFM: PRESENT
NOROXYMORPHONE UR QL SCN: NOT DETECTED
OXAZEPAM UR QL: NOT DETECTED
OXYCODONE UR QL: PRESENT
OXYMORPHONE UR QL: PRESENT
PATHOLOGY STUDY: NORMAL
PCP UR QL: NEGATIVE
PHENTERMINE UR QL: NOT DETECTED
PREGABALIN: NOT DETECTED
SERVICE CMNT-IMP: NORMAL
TAPENTADOL UR QL SCN: NOT DETECTED
TAPENTADOL UR QL SCN: NOT DETECTED
TEMAZEPAM UR QL: NOT DETECTED
TRAMADOL UR QL: NEGATIVE
ZOLPIDEM METABOLITE: NOT DETECTED
ZOLPIDEM UR QL: NOT DETECTED

## 2023-11-28 ENCOUNTER — TELEPHONE (OUTPATIENT)
Dept: NEUROLOGY | Facility: CLINIC | Age: 43
End: 2023-11-28
Payer: COMMERCIAL

## 2023-11-28 NOTE — TELEPHONE ENCOUNTER
PA for epidiolex approved until 12/31/2024. Faxed approval to [facility] [phone]

## 2023-11-28 NOTE — TELEPHONE ENCOUNTER
----- Message from Noelle Maravillagucci sent at 11/28/2023  9:37 AM CST -----  Regarding: RX Refill Request/Prior authorization needed  Contact: [facility] at [phone] ext [phone]  Type:  RX Refill Request/Prior authorization needed    Who Called:    [facility] at [phone] ext [phone]  fax #[phone]    Additional Information:  Calling to get authorization for medication through Cover [employer] #[id number]. Please call and advise. Thank you    cannabidioL (EPIDIOLEX) 100 mg/mL 540 mL 5 11/14/2023 -   Sig - Route: Take 8.86 mLs (886 mg total) by mouth 2 (two) times daily. - Oral   Sent to pharmacy as: cannabidioL (EPIDIOLEX) 100 mg/mL   E-Prescribing Status: Receipt confirmed by pharmacy (11/14/2023  3:47 PM CST)   No prior authorization was found for this prescription.   Found prior authorization for another prescription for the same medication: Approved

## 2023-11-29 ENCOUNTER — TELEPHONE (OUTPATIENT)
Dept: PAIN MEDICINE | Facility: CLINIC | Age: 43
End: 2023-11-29
Payer: COMMERCIAL

## 2023-11-29 NOTE — TELEPHONE ENCOUNTER
----- Message from Ramya Coon sent at 11/29/2023 11:42 AM CST -----  Contact: pt  Type: Needs Medical Advice  Who Called:  pt  Best Call Back Number: [phone]    Additional Information: Pt is calling the office needs to schedule an injection.Please call back and advise.

## 2023-12-04 DIAGNOSIS — M25.512 ACUTE PAIN OF LEFT SHOULDER: Primary | ICD-10-CM

## 2023-12-05 ENCOUNTER — OFFICE VISIT (OUTPATIENT)
Dept: FAMILY MEDICINE | Facility: CLINIC | Age: 43
End: 2023-12-05
Payer: COMMERCIAL

## 2023-12-05 ENCOUNTER — TELEPHONE (OUTPATIENT)
Dept: FAMILY MEDICINE | Facility: CLINIC | Age: 43
End: 2023-12-05
Payer: COMMERCIAL

## 2023-12-05 DIAGNOSIS — K21.9 GASTROESOPHAGEAL REFLUX DISEASE, UNSPECIFIED WHETHER ESOPHAGITIS PRESENT: ICD-10-CM

## 2023-12-05 DIAGNOSIS — J34.89 SORE IN NOSE: Primary | ICD-10-CM

## 2023-12-05 DIAGNOSIS — G40.909 SEIZURE DISORDER: ICD-10-CM

## 2023-12-05 DIAGNOSIS — G44.209 TENSION HEADACHE: ICD-10-CM

## 2023-12-05 DIAGNOSIS — G89.4 CHRONIC PAIN SYNDROME: ICD-10-CM

## 2023-12-05 DIAGNOSIS — I10 PRIMARY HYPERTENSION: ICD-10-CM

## 2023-12-05 PROCEDURE — 4010F ACE/ARB THERAPY RXD/TAKEN: CPT | Mod: CPTII,95,, | Performed by: PHYSICIAN ASSISTANT

## 2023-12-05 PROCEDURE — 1160F PR REVIEW ALL MEDS BY PRESCRIBER/CLIN PHARMACIST DOCUMENTED: ICD-10-PCS | Mod: CPTII,95,, | Performed by: PHYSICIAN ASSISTANT

## 2023-12-05 PROCEDURE — 1160F RVW MEDS BY RX/DR IN RCRD: CPT | Mod: CPTII,95,, | Performed by: PHYSICIAN ASSISTANT

## 2023-12-05 PROCEDURE — 1159F MED LIST DOCD IN RCRD: CPT | Mod: CPTII,95,, | Performed by: PHYSICIAN ASSISTANT

## 2023-12-05 PROCEDURE — 4010F PR ACE/ARB THEARPY RXD/TAKEN: ICD-10-PCS | Mod: CPTII,95,, | Performed by: PHYSICIAN ASSISTANT

## 2023-12-05 PROCEDURE — 1159F PR MEDICATION LIST DOCUMENTED IN MEDICAL RECORD: ICD-10-PCS | Mod: CPTII,95,, | Performed by: PHYSICIAN ASSISTANT

## 2023-12-05 PROCEDURE — 99213 OFFICE O/P EST LOW 20 MIN: CPT | Mod: 95,,, | Performed by: PHYSICIAN ASSISTANT

## 2023-12-05 PROCEDURE — 99213 PR OFFICE/OUTPT VISIT, EST, LEVL III, 20-29 MIN: ICD-10-PCS | Mod: 95,,, | Performed by: PHYSICIAN ASSISTANT

## 2023-12-05 RX ORDER — METHOCARBAMOL 500 MG/1
500 TABLET, FILM COATED ORAL 4 TIMES DAILY
Qty: 40 TABLET | Refills: 0 | Status: SHIPPED | OUTPATIENT
Start: 2023-12-05 | End: 2023-12-15

## 2023-12-05 RX ORDER — SULFAMETHOXAZOLE AND TRIMETHOPRIM 800; 160 MG/1; MG/1
1 TABLET ORAL 2 TIMES DAILY
Qty: 20 TABLET | Refills: 0 | Status: SHIPPED | OUTPATIENT
Start: 2023-12-05

## 2023-12-06 ENCOUNTER — PATIENT MESSAGE (OUTPATIENT)
Dept: PAIN MEDICINE | Facility: CLINIC | Age: 43
End: 2023-12-06
Payer: COMMERCIAL

## 2023-12-06 NOTE — PROGRESS NOTES
Subjective:       Patient ID: Annie Baird is a 43 y.o. female.    Chief Complaint: No chief complaint on file.    The patient location is: [place], [place]  The chief complaint leading to consultation is: sore in nose, headaches    Visit type: audiovisual    Face to Face time with patient: 10 min  20 minutes of total time spent on the encounter, which includes face to face time and non-face to face time preparing to see the patient (eg, review of tests), Obtaining and/or reviewing separately obtained history, Documenting clinical information in the electronic or other health record, Independently interpreting results (not separately reported) and communicating results to the patient/family/caregiver, or Care coordination (not separately reported).         Each patient to whom he or she provides medical services by telemedicine is:  (1) informed of the relationship between the physician and patient and the respective role of any other health care provider with respect to management of the patient; and (2) notified that he or she may decline to receive medical services by telemedicine and may withdraw from such care at any time.    Notes: Patient is a 43 year old female with seizure disorder. She presents via telemedicine with concerns about a sore in her nose that is not healing with topical mupirocin. She denies fever, chills. She reports that she also has a headache for which she has been taking ibuprofen and aspirin daily. She feels the headache is located around her temples and feels like there is a band around her head.       Review of patient's allergies indicates:   Allergen Reactions    Depakote [divalproex] Hives    Dilantin [phenytoin sodium extended] Hives    Tramadol      Lowers seizure threshold    Wellbutrin [bupropion hcl]      Lowers seizure threshold         Current Outpatient Medications:     acetaminophen (TYLENOL) 325 MG tablet, Take 325 mg by mouth every 6 (six) hours as needed for  Pain., Disp: , Rfl:     azelastine (ASTELIN) 137 mcg (0.1 %) nasal spray, USE 2 SPRAYS NASALLY TWICE DAILY, Disp: 90 mL, Rfl: 1    cannabidioL (EPIDIOLEX) 100 mg/mL, Take 8.86 mLs (886 mg total) by mouth 2 (two) times daily., Disp: 540 mL, Rfl: 5    cetirizine (ZYRTEC) 10 MG tablet, Take 1 tablet (10 mg total) by mouth once daily., Disp: 90 tablet, Rfl: 3    gabapentin (NEURONTIN) 600 MG tablet, TAKE 1 TABLET THREE TIMES DAILY, Disp: 270 tablet, Rfl: 1    lamoTRIgine (LAMICTAL) 150 MG Tab, Take 2 tablets (300 mg total) by mouth 2 (two) times daily., Disp: 360 tablet, Rfl: 3    lamoTRIgine (LAMICTAL) 25 MG tablet, Take 1 tablet (25 mg total) by mouth 2 (two) times daily. For total of 325 mg twice daily, Disp: 180 tablet, Rfl: 3    levETIRAcetam (KEPPRA) 750 MG Tab, Take 2 tablets (1,500 mg total) by mouth 2 (two) times daily., Disp: 360 tablet, Rfl: 3    losartan (COZAAR) 50 MG tablet, TAKE 1 TABLET EVERY EVENING, Disp: 90 tablet, Rfl: 0    methocarbamoL (ROBAXIN) 500 MG Tab, Take 1 tablet (500 mg total) by mouth 4 (four) times daily. for 10 days, Disp: 40 tablet, Rfl: 0    metoprolol succinate (TOPROL-XL) 50 MG 24 hr tablet, TAKE 1 TABLET ONE TIME DAILY, Disp: 90 tablet, Rfl: 3    montelukast (SINGULAIR) 10 mg tablet, TAKE 1 TABLET EVERY EVENING, Disp: 90 tablet, Rfl: 10    multivitamin with minerals tablet, Take 1 tablet by mouth once daily., Disp: , Rfl:     mupirocin (BACTROBAN) 2 % ointment, APPLY TOPICALLY TO AFFECTED AREA(S) THREE TIMES DAILY, Disp: 22 g, Rfl: 11    naproxen (NAPROSYN) 500 MG tablet, Take 500 mg by mouth 2 (two) times daily as needed., Disp: , Rfl:     nortriptyline (PAMELOR) 25 MG capsule, Take 25 mg by mouth every evening., Disp: , Rfl:     omeprazole (PRILOSEC) 40 MG capsule, TAKE 1 CAPSULE EVERY MORNING, Disp: 90 capsule, Rfl: 1    ondansetron (ZOFRAN) 4 MG tablet, Take 8 mg by mouth every 8 (eight) hours as needed. , Disp: , Rfl:     oxyCODONE-acetaminophen (PERCOCET) 5-325 mg per tablet,  Take 1 tablet by mouth every 6 (six) hours as needed for Pain., Disp: 120 tablet, Rfl: 0    potassium gluconate 595 mg (99 mg) Tab, Take 1 tablet by mouth once daily., Disp: , Rfl:     solifenacin (VESICARE) 10 MG tablet, Take 1 tablet (10 mg total) by mouth once daily., Disp: 90 tablet, Rfl: 3    sulfamethoxazole-trimethoprim 800-160mg (BACTRIM DS) 800-160 mg Tab, Take 1 tablet by mouth 2 (two) times daily., Disp: 20 tablet, Rfl: 0    TENS UNITS MISC, by Misc.(Non-Drug; Combo Route) route., Disp: , Rfl:     varenicline (CHANTIX) 1 mg Tab, TAKE 1 TABLET TWICE DAILY, Disp: 180 tablet, Rfl: 0    vitamin D (VITAMIN D3) 1000 units Tab, Take 1,000 Units by mouth once daily., Disp: , Rfl:   No current facility-administered medications for this visit.    Facility-Administered Medications Ordered in Other Visits:     lactated ringers infusion, , Intravenous, Continuous, Vu, Jerman HIGHTOWER MD, Last Rate: 25 mL/hr at 09/22/23 1329, 500 mL at 09/22/23 1329    Lab Results   Component Value Date    WBC 12.47 09/14/2022    HGB 12.7 09/14/2022    HCT 39.0 09/14/2022     09/14/2022    CHOL 243 (H) 05/07/2021    TRIG 204 (H) 05/07/2021    HDL 49 05/07/2021    ALT 17 11/29/2021    AST 20 11/29/2021     09/14/2022    K 4.1 09/14/2022     09/14/2022    CREATININE 0.7 09/14/2022    BUN 9 09/14/2022    CO2 24 09/14/2022    TSH 0.570 11/29/2021    INR 1.0 09/14/2022    HGBA1C 4.9 05/07/2021       Review of Systems   Constitutional:  Negative for activity change, appetite change and fever.   HENT:  Negative for postnasal drip, rhinorrhea and sinus pressure.    Eyes:  Negative for visual disturbance.   Respiratory:  Negative for cough and shortness of breath.    Cardiovascular:  Negative for chest pain.   Gastrointestinal:  Negative for abdominal distention and abdominal pain.   Genitourinary:  Negative for difficulty urinating and dysuria.   Musculoskeletal:  Positive for arthralgias, back pain and neck pain. Negative for  myalgias.   Neurological:  Positive for seizures and headaches.   Hematological:  Negative for adenopathy.   Psychiatric/Behavioral:  The patient is not nervous/anxious.        Objective:      Physical Exam  Constitutional:       General: She is not in acute distress.     Appearance: Normal appearance.   HENT:      Head: Normocephalic and atraumatic.   Pulmonary:      Effort: Pulmonary effort is normal. No respiratory distress.   Neurological:      Mental Status: She is alert and oriented to person, place, and time.      Cranial Nerves: No cranial nerve deficit.   Psychiatric:         Behavior: Behavior normal.         Assessment:       1. Sore in nose    2. Seizure disorder    3. Primary hypertension    4. Chronic pain syndrome    5. Gastroesophageal reflux disease, unspecified whether esophagitis present    6. Tension headache        Plan:   Diagnoses and all orders for this visit:    Sore in nose  -     sulfamethoxazole-trimethoprim 800-160mg (BACTRIM DS) 800-160 mg Tab; Take 1 tablet by mouth 2 (two) times daily.  -     Basic Metabolic Panel; Future  Labs in 2 weeks to check potassium level.   Seizure disorder  Followed by neurology  Primary hypertension  Low salt diet  Chronic pain syndrome  Followed by pain management  Gastroesophageal reflux disease, unspecified whether esophagitis present  Avoid trigger foods    Tension headache  -     methocarbamoL (ROBAXIN) 500 MG Tab; Take 1 tablet (500 mg total) by mouth 4 (four) times daily. for 10 days  -     Basic Metabolic Panel; Future

## 2023-12-11 ENCOUNTER — TELEPHONE (OUTPATIENT)
Dept: NEUROLOGY | Facility: CLINIC | Age: 43
End: 2023-12-11
Payer: COMMERCIAL

## 2023-12-11 NOTE — TELEPHONE ENCOUNTER
Dr. Haas reviewed chart and unable to find device information for VNS. Called Dulce with [facility] to see if they have any device info. No answer. Left message requesting a call back.

## 2023-12-12 ENCOUNTER — OFFICE VISIT (OUTPATIENT)
Dept: NEUROLOGY | Facility: CLINIC | Age: 43
End: 2023-12-12
Payer: COMMERCIAL

## 2023-12-12 ENCOUNTER — PATIENT MESSAGE (OUTPATIENT)
Dept: NEUROLOGY | Facility: CLINIC | Age: 43
End: 2023-12-12

## 2023-12-12 ENCOUNTER — HOSPITAL ENCOUNTER (OUTPATIENT)
Dept: RADIOLOGY | Facility: HOSPITAL | Age: 43
Discharge: HOME | End: 2023-12-12
Attending: PSYCHIATRY & NEUROLOGY
Payer: COMMERCIAL

## 2023-12-12 DIAGNOSIS — G40.909 SEIZURE DISORDER: ICD-10-CM

## 2023-12-12 DIAGNOSIS — R51.9 NONINTRACTABLE HEADACHE, UNSPECIFIED CHRONICITY PATTERN, UNSPECIFIED HEADACHE TYPE: ICD-10-CM

## 2023-12-12 DIAGNOSIS — R41.3 MEMORY LOSS: Primary | ICD-10-CM

## 2023-12-12 DIAGNOSIS — Z96.89 S/P PLACEMENT OF VNS (VAGUS NERVE STIMULATION) DEVICE: ICD-10-CM

## 2023-12-12 PROCEDURE — 4010F PR ACE/ARB THEARPY RXD/TAKEN: ICD-10-PCS | Mod: CPTII,S$GLB,, | Performed by: PSYCHIATRY & NEUROLOGY

## 2023-12-12 PROCEDURE — 99999 PR PBB SHADOW E&M-EST. PATIENT-LVL I: CPT | Mod: PBBFAC,,, | Performed by: PSYCHIATRY & NEUROLOGY

## 2023-12-12 PROCEDURE — 99214 OFFICE O/P EST MOD 30 MIN: CPT | Mod: S$GLB,,, | Performed by: PSYCHIATRY & NEUROLOGY

## 2023-12-12 PROCEDURE — 70551 MRI BRAIN STEM W/O DYE: CPT | Mod: TC,PO

## 2023-12-12 PROCEDURE — 95977 ALYS CPLX CN NPGT PRGRMG: CPT | Mod: S$GLB,,, | Performed by: PSYCHIATRY & NEUROLOGY

## 2023-12-12 PROCEDURE — 95977 PR ELEC ANALYSIS, IMPLT NEURO PULSE GEN, W/PRGRM, CMPLX CRAN NERVE: ICD-10-PCS | Mod: S$GLB,,, | Performed by: PSYCHIATRY & NEUROLOGY

## 2023-12-12 PROCEDURE — 70551 MRI BRAIN WITHOUT CONTRAST: ICD-10-PCS | Mod: 26,,, | Performed by: RADIOLOGY

## 2023-12-12 PROCEDURE — 70551 MRI BRAIN STEM W/O DYE: CPT | Mod: 26,,, | Performed by: RADIOLOGY

## 2023-12-12 PROCEDURE — 4010F ACE/ARB THERAPY RXD/TAKEN: CPT | Mod: CPTII,S$GLB,, | Performed by: PSYCHIATRY & NEUROLOGY

## 2023-12-12 PROCEDURE — 99214 PR OFFICE/OUTPT VISIT, EST, LEVL IV, 30-39 MIN: ICD-10-PCS | Mod: S$GLB,,, | Performed by: PSYCHIATRY & NEUROLOGY

## 2023-12-12 PROCEDURE — 99999 PR PBB SHADOW E&M-EST. PATIENT-LVL I: ICD-10-PCS | Mod: PBBFAC,,, | Performed by: PSYCHIATRY & NEUROLOGY

## 2023-12-12 NOTE — PROGRESS NOTES
Patient presented to have VNS turned off prior to MRI.     VNS settings (interrogated and adjusted on 12/12/23):  RitikaR M106: S/N: [id number]  Implant date: June 21, 2018    Output current (mA):               1 ----> 0   Signal frequency (Hz):            15  Pulse width (µs):                     130  Signal on time (s):                   30  Signal off time (min):               1.8     Autostim current (mA):            1.125 -----> 0  Autostim pulse width (µs):      130  Autostim on time (s):               30     Magnet current (mA):              1.25 -----> 0  Magnet pulse width (µs):        250  Magnet on time (s):                 60    Impedance 3215 Ohms  Battery 50-75%    Final settings at 13:59 VNS is OFF.   Will come back to clinic after MRI to have VNS turned back on.       She notes that she is having more memory trouble and more confusion. She notes word finding difficulties lately. She also notes that she is having some episodes of blurry vision and headaches. Advised optho appointment and we will obtain neuropsych testing for further cognitive evaluation.     ____________________________________________________________________      Output current (mA):               0 ----> 1.125  Signal frequency (Hz):            15  Pulse width (µs):                     130  Signal on time (s):                   30  Signal off time (min):               1.8 ----> 1.1 min     Autostim current (mA):             0 ----> 1.25  Autostim pulse width (µs):      130  Autostim on time (s):               30     Magnet current (mA):              0 ----> 1.375  Magnet pulse width (µs):        250  Magnet on time (s):                 60    Turned back on after MRI at 14:54. Increased VNS settings due to recent seizures. Increased 4 settings. Patient tolerated well.     Patient notes she just got the epidiolex and will be starting that soon. Continue current doses of medications.     Going to have EEG and ambulatory EEG.     MRI  brain obtained and shows just minimal white matter changes.

## 2023-12-13 ENCOUNTER — TELEPHONE (OUTPATIENT)
Dept: SPINE | Facility: CLINIC | Age: 43
End: 2023-12-13
Payer: COMMERCIAL

## 2023-12-13 NOTE — TELEPHONE ENCOUNTER
----- Message from Inocente Chen sent at 12/13/2023  4:16 PM CST -----  Regarding: Request for additional clinical information  Dalton Walker on behalf of [employer] have requested additional clinicals information regarding this case.    1. Is the patient participating in an active rehabilitation program, e.g. physical therapy or home exercises, and if so for how long have they been participating.    2. Could you confirm that the patient's PCP is aware that the patient is receiving steroid injections.    Please provide the above information and I will relay it back to the reviewer. After 72 hours they will send for processing with the information they currently have.    Thank you  Inocente Chen  [facility]

## 2023-12-17 ENCOUNTER — PATIENT MESSAGE (OUTPATIENT)
Dept: PAIN MEDICINE | Facility: CLINIC | Age: 43
End: 2023-12-17
Payer: COMMERCIAL

## 2023-12-17 DIAGNOSIS — M53.3 SACROILIAC JOINT PAIN: ICD-10-CM

## 2023-12-17 DIAGNOSIS — M50.30 DDD (DEGENERATIVE DISC DISEASE), CERVICAL: ICD-10-CM

## 2023-12-17 DIAGNOSIS — M51.36 DDD (DEGENERATIVE DISC DISEASE), LUMBAR: ICD-10-CM

## 2023-12-17 DIAGNOSIS — G89.4 CHRONIC PAIN DISORDER: ICD-10-CM

## 2023-12-17 DIAGNOSIS — M54.16 LUMBAR RADICULOPATHY: ICD-10-CM

## 2023-12-18 ENCOUNTER — PATIENT MESSAGE (OUTPATIENT)
Dept: PAIN MEDICINE | Facility: CLINIC | Age: 43
End: 2023-12-18
Payer: COMMERCIAL

## 2023-12-18 RX ORDER — OXYCODONE AND ACETAMINOPHEN 5; 325 MG/1; MG/1
1 TABLET ORAL EVERY 6 HOURS PRN
Qty: 120 TABLET | Refills: 0 | Status: SHIPPED | OUTPATIENT
Start: 2023-12-22 | End: 2024-01-16 | Stop reason: SDUPTHER

## 2023-12-22 ENCOUNTER — TELEPHONE (OUTPATIENT)
Dept: PAIN MEDICINE | Facility: CLINIC | Age: 43
End: 2023-12-22
Payer: COMMERCIAL

## 2023-12-22 NOTE — TELEPHONE ENCOUNTER
Spoke with pharmacy pt cannot fill  med's there any longer , she will have to have it filled elsewhere after 12/23/2024. Pharmacy has reached out to the pt to make her aware.

## 2023-12-22 NOTE — TELEPHONE ENCOUNTER
----- Message from Carolin Simpson sent at 12/22/2023 11:09 AM CST -----  Regarding: Call back  Type:  Pharmacy Calling to Clarify an RX    Name of Caller:Gene    Pharmacy Name:      [id number] - [place], [place] - [address] [address] [address]  [address] [place] [address]  Phone: [phone] Fax: [phone]        Prescription Name:oxyCODONE-acetaminophen (PERCOCET) 5-325 mg per tablet     What do they need to clarify?:Needs to speak with DR about meds about [place].    Best Call Back Number:[phone]    Additional Information: Thank you

## 2024-01-11 DIAGNOSIS — R35.1 NOCTURIA: ICD-10-CM

## 2024-01-11 DIAGNOSIS — N39.46 MIXED INCONTINENCE: ICD-10-CM

## 2024-01-11 DIAGNOSIS — R35.0 URINARY FREQUENCY: ICD-10-CM

## 2024-01-11 RX ORDER — SOLIFENACIN SUCCINATE 10 MG/1
10 TABLET, FILM COATED ORAL
Qty: 90 TABLET | Refills: 0 | Status: SHIPPED | OUTPATIENT
Start: 2024-01-11 | End: 2024-01-30 | Stop reason: SDUPTHER

## 2024-01-12 ENCOUNTER — PATIENT MESSAGE (OUTPATIENT)
Dept: UROLOGY | Facility: CLINIC | Age: 44
End: 2024-01-12
Payer: COMMERCIAL

## 2024-01-16 DIAGNOSIS — M50.30 DDD (DEGENERATIVE DISC DISEASE), CERVICAL: ICD-10-CM

## 2024-01-16 DIAGNOSIS — M53.3 SACROILIAC JOINT PAIN: ICD-10-CM

## 2024-01-16 DIAGNOSIS — M51.36 DDD (DEGENERATIVE DISC DISEASE), LUMBAR: ICD-10-CM

## 2024-01-16 DIAGNOSIS — G89.4 CHRONIC PAIN DISORDER: ICD-10-CM

## 2024-01-16 DIAGNOSIS — M54.16 LUMBAR RADICULOPATHY: ICD-10-CM

## 2024-01-16 RX ORDER — OXYCODONE AND ACETAMINOPHEN 5; 325 MG/1; MG/1
1 TABLET ORAL EVERY 6 HOURS PRN
Qty: 120 TABLET | Refills: 0 | Status: SHIPPED | OUTPATIENT
Start: 2024-01-16 | End: 2024-02-13

## 2024-01-30 DIAGNOSIS — R35.0 URINARY FREQUENCY: ICD-10-CM

## 2024-01-30 DIAGNOSIS — N39.46 MIXED INCONTINENCE: ICD-10-CM

## 2024-01-30 DIAGNOSIS — K21.9 GASTROESOPHAGEAL REFLUX DISEASE, UNSPECIFIED WHETHER ESOPHAGITIS PRESENT: ICD-10-CM

## 2024-01-30 DIAGNOSIS — G89.29 CHRONIC MIDLINE LOW BACK PAIN, UNSPECIFIED WHETHER SCIATICA PRESENT: ICD-10-CM

## 2024-01-30 DIAGNOSIS — R35.1 NOCTURIA: ICD-10-CM

## 2024-01-30 DIAGNOSIS — J32.9 SINUSITIS, UNSPECIFIED CHRONICITY, UNSPECIFIED LOCATION: ICD-10-CM

## 2024-01-30 DIAGNOSIS — G40.909 SEIZURE DISORDER: ICD-10-CM

## 2024-01-30 DIAGNOSIS — R04.0 EPISTAXIS: ICD-10-CM

## 2024-01-30 DIAGNOSIS — J30.89 NON-SEASONAL ALLERGIC RHINITIS, UNSPECIFIED TRIGGER: ICD-10-CM

## 2024-01-30 DIAGNOSIS — M54.50 CHRONIC MIDLINE LOW BACK PAIN, UNSPECIFIED WHETHER SCIATICA PRESENT: ICD-10-CM

## 2024-01-30 DIAGNOSIS — I10 HYPERTENSION, UNSPECIFIED TYPE: ICD-10-CM

## 2024-01-31 ENCOUNTER — OFFICE VISIT (OUTPATIENT)
Dept: FAMILY MEDICINE | Facility: CLINIC | Age: 44
End: 2024-01-31
Payer: COMMERCIAL

## 2024-01-31 DIAGNOSIS — J34.89 SORE IN NOSE: Primary | ICD-10-CM

## 2024-01-31 DIAGNOSIS — M54.2 NECK PAIN: ICD-10-CM

## 2024-01-31 DIAGNOSIS — R51.9 NONINTRACTABLE HEADACHE, UNSPECIFIED CHRONICITY PATTERN, UNSPECIFIED HEADACHE TYPE: ICD-10-CM

## 2024-01-31 PROCEDURE — 1160F RVW MEDS BY RX/DR IN RCRD: CPT | Mod: CPTII,95,,

## 2024-01-31 PROCEDURE — 4010F ACE/ARB THERAPY RXD/TAKEN: CPT | Mod: CPTII,95,,

## 2024-01-31 PROCEDURE — 1159F MED LIST DOCD IN RCRD: CPT | Mod: CPTII,95,,

## 2024-01-31 PROCEDURE — 99214 OFFICE O/P EST MOD 30 MIN: CPT | Mod: 95,,,

## 2024-01-31 RX ORDER — LOSARTAN POTASSIUM 50 MG/1
50 TABLET ORAL NIGHTLY
Qty: 90 TABLET | Refills: 0 | Status: SHIPPED | OUTPATIENT
Start: 2024-01-31

## 2024-01-31 RX ORDER — AZELASTINE 1 MG/ML
2 SPRAY, METERED NASAL 2 TIMES DAILY
Qty: 90 ML | Refills: 1 | Status: SHIPPED | OUTPATIENT
Start: 2024-01-31 | End: 2024-04-24

## 2024-01-31 RX ORDER — DOXYCYCLINE 100 MG/1
100 CAPSULE ORAL 2 TIMES DAILY
Qty: 20 CAPSULE | Refills: 0 | Status: SHIPPED | OUTPATIENT
Start: 2024-01-31 | End: 2024-02-10

## 2024-01-31 RX ORDER — LEVETIRACETAM 750 MG/1
1500 TABLET ORAL 2 TIMES DAILY
Qty: 360 TABLET | Refills: 3 | OUTPATIENT
Start: 2024-01-31

## 2024-01-31 RX ORDER — METOPROLOL SUCCINATE 50 MG/1
50 TABLET, EXTENDED RELEASE ORAL DAILY
Qty: 90 TABLET | Refills: 0 | Status: SHIPPED | OUTPATIENT
Start: 2024-01-31 | End: 2024-04-10

## 2024-01-31 RX ORDER — MONTELUKAST SODIUM 10 MG/1
10 TABLET ORAL NIGHTLY
Qty: 90 TABLET | Refills: 10 | Status: SHIPPED | OUTPATIENT
Start: 2024-01-31

## 2024-01-31 RX ORDER — CANNABIDIOL 100 MG/ML
7 SOLUTION ORAL 2 TIMES DAILY
Qty: 540 ML | Refills: 5 | OUTPATIENT
Start: 2024-01-31

## 2024-01-31 RX ORDER — CETIRIZINE HYDROCHLORIDE 10 MG/1
10 TABLET ORAL DAILY
Qty: 90 TABLET | Refills: 3 | Status: SHIPPED | OUTPATIENT
Start: 2024-01-31 | End: 2024-04-02 | Stop reason: SDUPTHER

## 2024-01-31 RX ORDER — MUPIROCIN 20 MG/G
OINTMENT TOPICAL
Qty: 22 G | Refills: 11 | Status: SHIPPED | OUTPATIENT
Start: 2024-01-31

## 2024-01-31 RX ORDER — LAMOTRIGINE 150 MG/1
300 TABLET ORAL 2 TIMES DAILY
Qty: 360 TABLET | Refills: 3 | OUTPATIENT
Start: 2024-01-31

## 2024-01-31 RX ORDER — OMEPRAZOLE 40 MG/1
40 CAPSULE, DELAYED RELEASE ORAL EVERY MORNING
Qty: 90 CAPSULE | Refills: 1 | Status: SHIPPED | OUTPATIENT
Start: 2024-01-31

## 2024-01-31 RX ORDER — LAMOTRIGINE 25 MG/1
25 TABLET ORAL 2 TIMES DAILY
Qty: 180 TABLET | Refills: 3 | OUTPATIENT
Start: 2024-01-31

## 2024-01-31 RX ORDER — SOLIFENACIN SUCCINATE 10 MG/1
10 TABLET, FILM COATED ORAL DAILY
Qty: 90 TABLET | Refills: 0 | Status: SHIPPED | OUTPATIENT
Start: 2024-01-31

## 2024-01-31 RX ORDER — GABAPENTIN 600 MG/1
600 TABLET ORAL 3 TIMES DAILY
Qty: 270 TABLET | Refills: 3 | Status: SHIPPED | OUTPATIENT
Start: 2024-01-31 | End: 2024-04-02 | Stop reason: SDUPTHER

## 2024-01-31 NOTE — PATIENT INSTRUCTIONS

## 2024-01-31 NOTE — PROGRESS NOTES
Subjective:       Patient ID: Annie Baird is a 44 y.o. female.  The patient location is: [place], [place]   The chief complaint leading to consultation is: Headache, sore in nose    Visit type: audiovisual    Face to Face time with patient: 12 minutees  25 minutes of total time spent on the encounter, which includes face to face time and non-face to face time preparing to see the patient (eg, review of tests), Obtaining and/or reviewing separately obtained history, Documenting clinical information in the electronic or other health record, Independently interpreting results (not separately reported) and communicating results to the patient/family/caregiver, or Care coordination (not separately reported).         Each patient to whom he or she provides medical services by telemedicine is:  (1) informed of the relationship between the physician and patient and the respective role of any other health care provider with respect to management of the patient; and (2) notified that he or she may decline to receive medical services by telemedicine and may withdraw from such care at any time.      Chief Complaint: No chief complaint on file.    Patient presents virtually to the clinic with complaint of headache and sore in nose.    She reports that she has had a sore in her left nostril for months now.  States she has been on 2 rounds of antibiotics as well as using bacitracin.  States area is tender to touch.  States the sore will seem as if it is healing and then get worse again.     She also reports continued headaches.  She is being followed by Neurology.  She has an upcoming MRI.  She would like to get an x-ray of her neck.  States she has a donor disc, like to make sure this is not contributing to the worsening of her headaches.  She has been taking BC powders with mild relief.  States when she lays down she feels as if she is laying on something hard.    Patient educated on plan of care, verbalized understanding.         Review of Systems   Constitutional:  Negative for activity change, appetite change, chills, diaphoresis and fever.   HENT:  Negative for congestion, ear pain, postnasal drip, sinus pressure, sneezing and sore throat.    Eyes:  Negative for pain, discharge, redness and itching.   Respiratory:  Negative for apnea, cough, chest tightness, shortness of breath and wheezing.    Cardiovascular:  Negative for chest pain and leg swelling.   Gastrointestinal:  Negative for abdominal distention, abdominal pain, constipation, diarrhea, nausea and vomiting.   Genitourinary:  Negative for difficulty urinating, dysuria, flank pain and frequency.   Musculoskeletal:  Positive for arthralgias, myalgias, neck pain and neck stiffness.   Skin:  Positive for wound. Negative for color change and rash.   Neurological:  Positive for headaches. Negative for dizziness.   All other systems reviewed and are negative.      Patient Active Problem List   Diagnosis    Gastroesophageal reflux disease    Mild intermittent asthma without complication    Seizure disorder    Chronic midline low back pain    Primary hypertension    Hemiparesis affecting left side as late effect of stroke    Debility    ALESSIA (generalized anxiety disorder)    Morbid obesity    Overactive bladder    Lennox-Gastaut syndrome    B12 deficiency    Chronic pain syndrome    Raynaud's phenomenon    Spinal stenosis of lumbar region    Lumbar spondylosis    Degenerative disc disease, lumbar    Non-seasonal allergic rhinitis    S/P placement of VNS (vagus nerve stimulation) device    Mixed hyperlipidemia    Sacroiliitis    Depression, major, recurrent, moderate       Objective:      Physical Exam  Constitutional:       General: She is not in acute distress.     Appearance: Normal appearance. She is not ill-appearing.   HENT:      Head: Normocephalic.   Eyes:      Conjunctiva/sclera: Conjunctivae normal.      Pupils: Pupils are equal, round, and reactive to light.      Comments:  As seen on virtual visit   Pulmonary:      Effort: Pulmonary effort is normal. No respiratory distress.   Musculoskeletal:      Cervical back: Normal range of motion and neck supple.   Skin:     General: Skin is warm and dry.   Neurological:      General: No focal deficit present.      Mental Status: She is alert and oriented to person, place, and time.   Psychiatric:         Mood and Affect: Mood normal.         Behavior: Behavior normal.         Lab Results   Component Value Date    WBC 12.47 09/14/2022    HGB 12.7 09/14/2022    HCT 39.0 09/14/2022     09/14/2022    CHOL 243 (H) 05/07/2021    TRIG 204 (H) 05/07/2021    HDL 49 05/07/2021    ALT 17 11/29/2021    AST 20 11/29/2021     09/14/2022    K 4.1 09/14/2022     09/14/2022    CREATININE 0.7 09/14/2022    BUN 9 09/14/2022    CO2 24 09/14/2022    TSH 0.570 11/29/2021    INR 1.0 09/14/2022    HGBA1C 4.9 05/07/2021     The 10-year ASCVD risk score (Prabhakar ARGUETA, et al., 2019) is: 7.6%    Values used to calculate the score:      Age: 44 years      Sex: Female      Is Non- : No      Diabetic: No      Tobacco smoker: Yes      Systolic Blood Pressure: 141 mmHg      Is BP treated: Yes      HDL Cholesterol: 49 mg/dL      Total Cholesterol: 243 mg/dL    Assessment:       1. Sore in nose    2. Neck pain    3. Nonintractable headache, unspecified chronicity pattern, unspecified headache type        Plan:       1. Sore in nose  -     doxycycline (VIBRAMYCIN) 100 MG Cap; Take 1 capsule (100 mg total) by mouth 2 (two) times daily. for 10 days  Dispense: 20 capsule; Refill: 0  -     Ambulatory referral/consult to ENT; Future; Expected date: 02/07/2024   - The diagnosis, treatment plan, instructions for follow-up and reevaluation as well as ED precautions were discussed and understanding was verbalized. All questions or concerns have been addressed.     2. Neck pain  -     X-Ray Cervical Spine AP And Lateral; Future; Expected date:  01/31/2024    3. Nonintractable headache, unspecified chronicity pattern, unspecified headache type  -     X-Ray Cervical Spine AP And Lateral; Future; Expected date: 01/31/2024   - Follow up with Neurology     Follow up if symptoms worsen or fail to improve.      Future Appointments       Date Provider Specialty Appt Notes    2/16/2024 Shabana Leone, EDUARDO Pain Medicine [facility] follow up    3/11/2024 Elisha Vazquez MD Urology Discuss Botox

## 2024-01-31 NOTE — TELEPHONE ENCOUNTER
No care due was identified.  [facility] Embedded Care Due Messages. Reference number: [id number].   1/30/2024 6:24:22 PM CST

## 2024-01-31 NOTE — TELEPHONE ENCOUNTER
Refill Routing Note   Medication(s) are not appropriate for processing by [facility] for the following reason(s):        Required vitals abnormal    [facility] action(s):  Defer   Requires appointment : Yes             Appointments  past 12m or future 3m with PCP    Date Provider   Last Visit   3/24/2023 Andre Kirk MD   Next Visit   1/30/2024 Andre Kirk MD   ED visits in past 90 days: 0        Note composed:7:37 PM 01/30/2024

## 2024-01-31 NOTE — TELEPHONE ENCOUNTER
Care Due:                  Date            Visit Type   Department     Provider  --------------------------------------------------------------------------------                                ESTABLISHED                              PATIENT -    [facility]  Last Visit: 03-      [facility]       Andre Kirk  Next Visit: None Scheduled  None         None Found                                                            Last  Test          Frequency    Reason                     Performed    Due Date  --------------------------------------------------------------------------------    Office Visit  6 months...  varenicline..............  03- 09-    CMP.........  12 months..  losartan, varenicline....  Not Found    Overdue    [employer] Embedded Care Due Messages. Reference number: [id number].   1/30/2024 6:20:44 PM CST

## 2024-01-31 NOTE — TELEPHONE ENCOUNTER
Last visit with you 3/24/23  Last appt 12/5/23 with prasanth   Next appt 1/31/24  All this appt are virtual

## 2024-02-14 DIAGNOSIS — I10 HYPERTENSION, UNSPECIFIED TYPE: ICD-10-CM

## 2024-02-14 DIAGNOSIS — J32.9 SINUSITIS, UNSPECIFIED CHRONICITY, UNSPECIFIED LOCATION: ICD-10-CM

## 2024-02-14 NOTE — TELEPHONE ENCOUNTER
No care due was identified.  [facility] Embedded Care Due Messages. Reference number: [id number].   2/14/2024 2:04:47 AM CST

## 2024-02-15 RX ORDER — AZELASTINE 1 MG/ML
2 SPRAY, METERED NASAL 2 TIMES DAILY
Qty: 90 ML | Refills: 0 | OUTPATIENT
Start: 2024-02-15

## 2024-02-15 RX ORDER — LOSARTAN POTASSIUM 50 MG/1
50 TABLET ORAL NIGHTLY
Qty: 90 TABLET | Refills: 0 | OUTPATIENT
Start: 2024-02-15

## 2024-02-15 NOTE — TELEPHONE ENCOUNTER
Refill Decision Note  Quick DC. Request already responded to by other means (e.g. phone or fax)    Annie Baird  is requesting a refill authorization.  Brief Assessment and Rationale for Refill:  Quick Discontinue     Medication Therapy Plan:       Medication Reconciliation Completed: No   Comments:           Note composed:11:13 AM 02/15/2024

## 2024-02-16 ENCOUNTER — TELEPHONE (OUTPATIENT)
Dept: PAIN MEDICINE | Facility: CLINIC | Age: 44
End: 2024-02-16

## 2024-02-16 ENCOUNTER — OFFICE VISIT (OUTPATIENT)
Dept: PAIN MEDICINE | Facility: CLINIC | Age: 44
End: 2024-02-16
Payer: COMMERCIAL

## 2024-02-16 ENCOUNTER — HOSPITAL ENCOUNTER (OUTPATIENT)
Dept: RADIOLOGY | Facility: CLINIC | Age: 44
Discharge: HOME | End: 2024-02-16
Payer: COMMERCIAL

## 2024-02-16 VITALS
WEIGHT: 260 LBS | SYSTOLIC BLOOD PRESSURE: 127 MMHG | HEIGHT: 63 IN | HEART RATE: 78 BPM | DIASTOLIC BLOOD PRESSURE: 77 MMHG | BODY MASS INDEX: 46.07 KG/M2

## 2024-02-16 DIAGNOSIS — M51.36 DDD (DEGENERATIVE DISC DISEASE), LUMBAR: ICD-10-CM

## 2024-02-16 DIAGNOSIS — M53.3 SACROILIAC JOINT PAIN: ICD-10-CM

## 2024-02-16 DIAGNOSIS — Z79.891 CHRONIC USE OF OPIATE FOR THERAPEUTIC PURPOSE: ICD-10-CM

## 2024-02-16 DIAGNOSIS — M54.16 LUMBAR RADICULOPATHY: ICD-10-CM

## 2024-02-16 DIAGNOSIS — G89.4 CHRONIC PAIN DISORDER: ICD-10-CM

## 2024-02-16 DIAGNOSIS — M54.16 LUMBAR RADICULOPATHY: Primary | ICD-10-CM

## 2024-02-16 DIAGNOSIS — M54.2 NECK PAIN: ICD-10-CM

## 2024-02-16 DIAGNOSIS — R51.9 NONINTRACTABLE HEADACHE, UNSPECIFIED CHRONICITY PATTERN, UNSPECIFIED HEADACHE TYPE: ICD-10-CM

## 2024-02-16 DIAGNOSIS — M50.30 DDD (DEGENERATIVE DISC DISEASE), CERVICAL: ICD-10-CM

## 2024-02-16 DIAGNOSIS — G89.4 CHRONIC PAIN DISORDER: Primary | ICD-10-CM

## 2024-02-16 PROCEDURE — 1160F RVW MEDS BY RX/DR IN RCRD: CPT | Mod: CPTII,S$GLB,,

## 2024-02-16 PROCEDURE — 99999 PR PBB SHADOW E&M-EST. PATIENT-LVL V: CPT | Mod: PBBFAC,,,

## 2024-02-16 PROCEDURE — 99214 OFFICE O/P EST MOD 30 MIN: CPT | Mod: S$GLB,,,

## 2024-02-16 PROCEDURE — 72040 X-RAY EXAM NECK SPINE 2-3 VW: CPT | Mod: 26,,, | Performed by: RADIOLOGY

## 2024-02-16 PROCEDURE — 1159F MED LIST DOCD IN RCRD: CPT | Mod: CPTII,S$GLB,,

## 2024-02-16 PROCEDURE — 80326 AMPHETAMINES 5 OR MORE: CPT

## 2024-02-16 PROCEDURE — 3008F BODY MASS INDEX DOCD: CPT | Mod: CPTII,S$GLB,,

## 2024-02-16 PROCEDURE — 3078F DIAST BP <80 MM HG: CPT | Mod: CPTII,S$GLB,,

## 2024-02-16 PROCEDURE — 3074F SYST BP LT 130 MM HG: CPT | Mod: CPTII,S$GLB,,

## 2024-02-16 PROCEDURE — 72040 X-RAY EXAM NECK SPINE 2-3 VW: CPT | Mod: TC,FY,PO

## 2024-02-16 PROCEDURE — 4010F ACE/ARB THERAPY RXD/TAKEN: CPT | Mod: CPTII,S$GLB,,

## 2024-02-16 RX ORDER — ARMODAFINIL 250 MG/1
TABLET ORAL DAILY
COMMUNITY

## 2024-02-16 RX ORDER — CANNABIDIOL 100 MG/ML
SOLUTION ORAL
COMMUNITY

## 2024-02-16 RX ORDER — ROSUVASTATIN CALCIUM 20 MG/1
TABLET, COATED ORAL
COMMUNITY

## 2024-02-16 RX ORDER — CLOTRIMAZOLE 1 %
CREAM (GRAM) TOPICAL
COMMUNITY

## 2024-02-16 RX ORDER — BENZTROPINE MESYLATE 0.5 MG/1
TABLET ORAL
COMMUNITY

## 2024-02-16 RX ORDER — HYDROCODONE BITARTRATE AND ACETAMINOPHEN 7.5; 325 MG/1; MG/1
1 TABLET ORAL EVERY 6 HOURS PRN
COMMUNITY
End: 2024-02-16

## 2024-02-16 RX ORDER — OXYCODONE AND ACETAMINOPHEN 5; 325 MG/1; MG/1
1 TABLET ORAL EVERY 6 HOURS PRN
Qty: 120 EACH | Refills: 0 | Status: SHIPPED | OUTPATIENT
Start: 2024-04-15 | End: 2024-05-13 | Stop reason: SDUPTHER

## 2024-02-16 RX ORDER — LOSARTAN POTASSIUM 50 MG/1
1 TABLET ORAL DAILY
COMMUNITY

## 2024-02-16 RX ORDER — BUDESONIDE 0.5 MG/2ML
INHALANT ORAL
COMMUNITY

## 2024-02-16 RX ORDER — GABAPENTIN 300 MG/1
1 CAPSULE ORAL 3 TIMES DAILY
COMMUNITY

## 2024-02-16 RX ORDER — OXYCODONE AND ACETAMINOPHEN 5; 325 MG/1; MG/1
1 TABLET ORAL EVERY 6 HOURS PRN
Qty: 120 EACH | Refills: 0 | Status: SHIPPED | OUTPATIENT
Start: 2024-03-17 | End: 2024-04-15

## 2024-02-16 RX ORDER — TOLTERODINE 2 MG/1
1 CAPSULE, EXTENDED RELEASE ORAL DAILY
COMMUNITY

## 2024-02-16 RX ORDER — OXYCODONE AND ACETAMINOPHEN 5; 325 MG/1; MG/1
1 TABLET ORAL EVERY 6 HOURS PRN
Qty: 120 EACH | Refills: 0 | Status: SHIPPED | OUTPATIENT
Start: 2024-02-17 | End: 2024-03-17

## 2024-02-16 NOTE — TELEPHONE ENCOUNTER
Level of Service:95249     ID OFFICE/OUTPT VISIT, EST, LEVL IV, 30-39 MIN      Types of orders made on 02/16/2024: Lab, Medications, Procedure Request      Order Date:2/16/2024      Ordering User:SHABANA ALONSO [[id number]]   Encounter Provider:Shabana Alonso NP [[id number]]   Authorizing Provider: Shabana Alonso NP [[id number]]   Supervising Provider:DESMOND CARRILLO [[id number]]   Type of Supervision:Collaborating Physician   Department:[facility] PAIN MANAGEMENT[[id number]]      Common Order Information   Procedure -> Transforaminal Injection (Specify level and laterality) Cmt:             bilateral L5-S1      Pre-op Diag   nosis -> Lumbar radiculopathy       -> DDD (degenerative disc disease), lumbar      Order Specific Information   Order: Procedure Order to Pain Management [Custom: [id number]]  Order #:          [id number]: 1 FUTURE     Priority: Routine  Class: Clinic Performed     Future Order Information       Expires on:02/16/2025            Expected by:02/16/2024                   Associated Diagnoses       M54.16 L   umbar radiculopathy       M51.36 DDD (degenerative disc disease), lumbar       Physician -> dr. carrillo          Is patient on anti-coagulants? -> No          Facility Name: -> [place]          Follow-up: -> 4 weeks              Priority: Routine  Class: Clinic Performed     Future Order Information       Expires on:02/16/2025            Expected by:02/16/2024                   Associated Diagnoses       M54.16 Lumbar radiculopath   y       M51.36 DDD (degenerative disc disease), lumbar       Procedure -> Transforaminal Injection (Specify level and laterality) Cmt:                 bilateral L5-S1          Physician -> dr. carrillo

## 2024-02-16 NOTE — PROGRESS NOTES
FOLLOW UP NOTE:     CHIEF COMPLAINT: neck, back, leg, and arm pain    INTERVAL HISTORY OF PRESENT ILLNESS: Annie Baird is a 43 y.o. female with PMH significant for seizure disorder s/p vagal nerve stimulator placement, HTN, hx of anxiety on chronic benzodiazepines, hx of left shoulder arthroscopic surgery (12/4/2012), hx of right CTS release (11/22/2013), hx of multiple CVA's (residual left sided weakness per patient and daughter report), narcolepsy, insomnia, and hx of C5-C6 ACDF (5/1/2019) presents as an established patient for the continued management of chronic pain in multiple areas. The patient presents today for medication refill. The patient was scheduled for TFESI but had to cancel secondary to becoming ill and would like to reschedule.   The patient continues to report of lower back pain. Patient continues to report of lower back pain that radiates down BLE. The patient is requesting to repeat procedure.  The patient also reports of left shoulder and shoulder blade pain.  The patient reports that activity in general worsens her pain. The patient is tolerating her current pain regimen without adverse side effects. The patient reports of increase of stress as of late as well due to family conflicts. Patient has completed PT in the past with mild benefit. The patient continues at home exercises with mild relief. The patient denies of any significant changes in her health since her last appointment. The patient also denies of any changes in the character of her pain since her last appointment. The patient reports that her current pain is a 4/10. Patient denies of any urinary/fecal incontinence, saddle anesthesia, or weakness.    Of note, the patient's PCP on file has acknowledged Interventional Pain Management plan of care.      INITIAL HISTORY OF PRESENT ILLNESS: Annie Baird is a 41 y.o. female with PMH significant for seizure disorder s/p vagal nerve stimulator placement, HTN, hx of  anxiety on chronic benzodiazepines, hx of left shoulder arthroscopic surgery (12/4/2012), hx of right CTS release (11/22/2013), hx of multiple CVA's (residual left sided weakness per patient and daughter report), narcolepsy, insomnia, and hx of C5-C6 ACDF (5/1/2019) presents as a referral for the evaluation of multiple pain complaints. The patient reports that her low back pain is the worst of her pains. The patient has had a rather extensive work-up in [place], and I have put the relevant diagnostic tests in my note below. In summary:     The patient reports that her pain began approximately 1 year ago when she experienced low back pain. The patient denies of any inciting incident or trauma. The patient reports of worsening pain over time. She reported of progressive symptoms that extended into her lower extremities. She eventually presented to the hospital for evaluation of possible cauda equina syndrome. CT myelogram results copied below. In regards to her pain: the patient localizes her pain to center of her lower back. The patient reports of radiation to her bilateral buttocks and down the posterior aspect of her RLE to her foot. The patient describes her pain as a sharp, stinging, and burning type of pain. The patient reports of numbness in her left 2nd, 3rd, and 4th digits in her hand and her 1st and 2nd toes bilaterally. The patient reports that her pain is a 6/10. Patient denies of any fecal incontinence or saddle anesthesia. The patient reports of daily episodes of urinary incontinence which has been ongoing for the past few months. The patient reports of requiring the assistance of a wheelchair for ambulation for the past few months.      Aggravating factors: activity in general     Mitigating factors: laying on her left side     Relevant Surgeries: yes; history of cervical spine surgery X 1     Interventional Therapies: yes; Dr. Chun Pat in [place]. Dr. Pat did RFA's and epidurals. No benefit.        INTERVENTIONAL PAIN HISTORY:  9/22/2023: Interlaminar PATRICIA at L5-S1- 50% relief.   3/16/2023: Interlaminar PATRICIA at L5-S1 - moderate relief.    7/12/2021:  Lumbar interlaminar epidural steroid injection at L5-S1 under fluoroscopic guidance (left paramedian approach) - improvement for a few days    CURRENT PAIN MEDICATIONS:   gabapentin 600/600/900 mg PO TID  Norco 7.5- 325 mg PO q 6 hr  zofran 4 mg PO q 8 hr     Previously taking:   Ativan (once or twice a month)  temazepam 30 mg PO QHS (insomnia)        ROS:  Review of Systems   Constitutional:  Negative for chills and fever.   HENT:  Negative for sore throat.    Eyes:  Negative for visual disturbance.   Respiratory:  Negative for shortness of breath.    Cardiovascular:  Negative for chest pain.   Gastrointestinal:  Negative for nausea and vomiting.   Genitourinary:  Negative for difficulty urinating.   Musculoskeletal:  Positive for arthralgias, back pain, gait problem, myalgias and neck pain.   Skin:  Negative for rash.   Allergic/Immunologic: Negative for immunocompromised state.   Neurological:  Positive for seizures, weakness and numbness. Negative for syncope.   Hematological:  Does not bruise/bleed easily.   Psychiatric/Behavioral:  Negative for suicidal ideas.    All other systems reviewed and are negative.       MEDICAL, SURGICAL, FAMILY, SOCIAL HX: reviewed    MEDICATIONS/ALLERGIES: reviewed    PHYSICAL EXAM:    GENERAL: A and O x3, the patient appears well groomed and is in no acute distress.  Skin: No rashes or obvious lesions  HEENT: normocephalic, atraumatic  LUNGS: non labored breathing  ABDOMEN: non distended  UPPER EXTREMITIES and lower extremities. Normal ROM  CERVICAL SPINE:  Cervical spine: ROM is full in flexion, extension and lateral rotation without increased pain.    LUMBAR SPINE  Lumbar spine: ROM is limited with flexion extension and oblique extension with mild-to-moderate increased pain.      MENTAL STATUS: normal orientation, speech,  language, and fund of knowledge for social situation.  Emotional state appropriate.    GAIT: normal rise, base, steps, and arm swing.      GAIT: antalgic gait; unsteady gait    IMAGING:    3/2/2023: Cervical MRI: FINDINGS:  Cervical spine complete five views: There is a vagal stimulator.  There is ACDF with a plate vertebral body screws and a disc implant at C5-C6.  There is DJD.  There is mild neural foraminal narrowing at C5-C6.  No fracture dislocation bone destruction seen.  No complication or hardware failure seen.    3/2/23: XRAY LEFT SHOULDER  FINDINGS:  Shoulder trauma three views left: There is postoperative change of the glenoid.  There is a vagal stimulator.  There is postoperative change of the C-spine.  There is DJD of the glenohumeral joint.  No acute fracture dislocation bone destruction seen.    ASSESSMENT: Annie Baird is a 41 y.o. female with PMH significant for seizure disorder s/p vagal nerve stimulator placement, HTN, hx of anxiety on chronic benzodiazepines, hx of left shoulder arthroscopic surgery (12/4/2012), hx of right CTS release (11/22/2013), hx of multiple CVA's (residual left sided weakness per patient and daughter report), narcolepsy, insomnia, and hx of C5-C6 ACDF (5/1/2019) presents as an established patient,  for the continued management of chronic pain in multiple areas. The patient presents today for medication refill. Treatment plan outlined below.   No diagnosis found.         PLAN:  1. Refilled Percocet 7.5-325 mg PO q 6 hr PRN for breakthrough pain; # 120 tablets; 2 refills.  reviewed without discrepancies.   2. Continue gabapentin as prescribed for neuropathic pain.  3. Schedule for bilateral TFESI at L5- S1   4. External referral provided for left shoulder blade pain to [facility] in [place] per patient request. Referral printed and provided to the patient.   5. UDS collected today, last had pain medication today.   6. All pertinent chart  information is available for PCP on file to review.    7. RTC for the procedure as outlined above   All medication management performed by Dr. Nathan Leone, NP     I have used clinical judgement based on duration and functional status to consider definite necessity for treatment. Patient denies si/hi.

## 2024-03-05 ENCOUNTER — PATIENT MESSAGE (OUTPATIENT)
Dept: PAIN MEDICINE | Facility: CLINIC | Age: 44
End: 2024-03-05
Payer: COMMERCIAL

## 2024-03-05 DIAGNOSIS — M54.16 LUMBAR RADICULOPATHY: Primary | ICD-10-CM

## 2024-03-06 ENCOUNTER — TELEPHONE (OUTPATIENT)
Dept: PAIN MEDICINE | Facility: CLINIC | Age: 44
End: 2024-03-06
Payer: COMMERCIAL

## 2024-03-06 NOTE — TELEPHONE ENCOUNTER
Patient needs a drug screen collected at next office visit per mistake happening in February with specimen collection. Shabana tavarez.     Sylvie Gunderson LPN

## 2024-03-14 ENCOUNTER — PATIENT MESSAGE (OUTPATIENT)
Dept: PAIN MEDICINE | Facility: CLINIC | Age: 44
End: 2024-03-14
Payer: COMMERCIAL

## 2024-03-24 ENCOUNTER — E-VISIT (OUTPATIENT)
Dept: FAMILY MEDICINE | Facility: CLINIC | Age: 44
End: 2024-03-24
Payer: COMMERCIAL

## 2024-03-24 ENCOUNTER — PATIENT MESSAGE (OUTPATIENT)
Dept: FAMILY MEDICINE | Facility: CLINIC | Age: 44
End: 2024-03-24
Payer: COMMERCIAL

## 2024-03-24 DIAGNOSIS — R10.2 PELVIC PAIN: Primary | ICD-10-CM

## 2024-03-24 DIAGNOSIS — Z32.00 POSSIBLE PREGNANCY: Primary | ICD-10-CM

## 2024-03-24 PROCEDURE — 99422 OL DIG E/M SVC 11-20 MIN: CPT | Mod: ,,, | Performed by: STUDENT IN AN ORGANIZED HEALTH CARE EDUCATION/TRAINING PROGRAM

## 2024-03-25 ENCOUNTER — TELEPHONE (OUTPATIENT)
Dept: FAMILY MEDICINE | Facility: CLINIC | Age: 44
End: 2024-03-25
Payer: COMMERCIAL

## 2024-03-25 NOTE — TELEPHONE ENCOUNTER
----- Message from Andre Kirk MD sent at 3/25/2024  6:31 AM CDT -----  Regarding: urine lab, US, and vag swab  I placed an order for a urine test hcg and G/C alogn with an US and self vag swab for her to do for her pelvic pain/contractions. Thanks!

## 2024-03-25 NOTE — PROGRESS NOTES
Patient ID: Annie Baird is a 44 y.o. female.    Chief Complaint: Pelvic Pain             274}  The patient initiated a request through [employer] on 3/24/2024 for evaluation and management with a chief complaint of Pelvic Pain     I evaluated the questionnaire submission on 03/25/2024 .    Total Time (in minutes): 13     Ohs Peq Evisit General    3/24/2024  7:02 PM CDT - Filed by Patient   Do you agree to participate in an E-Visit? Yes   If you have any of the following symptoms, please present to your local ER or call 911:  I acknowledge   Choose the state of your primary residence [place]   What is the main issue that you would like for your doctor to address today? Pregnancy Test   Are you able to take your vital signs? No   Are you pregnant, could you be pregnant, or are you breast feeding? Could be pregnant   Please describe your symptoms Symptoms of pregnancy and cervix feels really low in the vaginal berenice and feels like i keep having contractionsl   Where is your problem located? Liwer belly / vagina   How severe are your symptoms? Moderate   Have you had these symptoms before? Yes   How long have you been having these symptoms? For one to four weeks   Please list any medications or treatments you have used for your condition and indicate if it was effective or not.    What makes this feel better? ???   What makes this feel worse? ???   Are these symptoms related to a condition that you currently have? No   Please describe any probable cause for these symptoms Pregnancy or ???   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    B12 deficiency 05/18/2021    Chronic pain syndrome 05/18/2021    Raynaud's phenomenon 05/18/2021    Gastroesophageal reflux disease 05/07/2021    Mild intermittent asthma without complication 05/07/2021    Seizure disorder 05/07/2021    Chronic midline low back pain 05/07/2021    Primary hypertension 05/07/2021    Hemiparesis affecting left side as late effect of stroke 05/07/2021    Debility 05/07/2021    ALESSIA (generalized anxiety disorder) 05/07/2021    Morbid obesity 05/07/2021    Overactive bladder 05/07/2021      Recent Labs Obtained:  Lab Results   Component Value Date    WBC 12.47 09/14/2022    HGB 12.7 09/14/2022    HCT 39.0 09/14/2022    MCV 89 09/14/2022     09/14/2022     09/14/2022    K 4.1 09/14/2022    GLU 84 09/14/2022    CREATININE 0.7 09/14/2022    EGFRNORACEVR >60.0 09/14/2022    HGBA1C 4.9 05/07/2021      Review of patient's allergies indicates:   Allergen Reactions    Depakote [divalproex] Hives    Dilantin [phenytoin sodium extended] Hives    Tramadol      Lowers seizure threshold    Wellbutrin [bupropion hcl]      Lowers seizure threshold       Encounter Diagnosis   Name Primary?    Pelvic pain Yes        Orders Placed This Encounter   Procedures    Vaginosis Screen by DNA Probe     Standing Status:   Future     Standing Expiration Date:   5/24/2025     Order Specific Question:   Release to patient     Answer:   Immediate    C. trachomatis/N. gonorrhoeae by AMP DNA     Standing Status:   Future     Standing Expiration Date:   5/24/2025     Order Specific Question:   Source:     Answer:   Urine    US Transvaginal Non OB     Standing Status:   Future     Standing Expiration Date:   3/25/2025     Order Specific Question:   May the Radiologist modify the order per protocol to meet the clinical needs of the patient?      Answer:   Yes     Order Specific Question:   Release to patient     Answer:   Immediate    Pregnancy, urine rapid     Standing Status:   Future     Standing Expiration Date:   5/24/2025     Order Specific Question:   Specimen Source     Answer:   Urine    HCG, QUANTITATIVE, PREGNANCY     Standing Status:   Future     Standing Expiration Date:   6/23/2025     Order Specific Question:   Release to patient     Answer:   Immediate            E-Visit Time Tracking:    Day 1 Time (in minutes): 13    Total Time (in minutes): 13         274}

## 2024-03-26 ENCOUNTER — PATIENT MESSAGE (OUTPATIENT)
Dept: FAMILY MEDICINE | Facility: CLINIC | Age: 44
End: 2024-03-26
Payer: COMMERCIAL

## 2024-04-02 ENCOUNTER — E-VISIT (OUTPATIENT)
Dept: FAMILY MEDICINE | Facility: CLINIC | Age: 44
End: 2024-04-02
Payer: COMMERCIAL

## 2024-04-02 DIAGNOSIS — M54.50 CHRONIC MIDLINE LOW BACK PAIN, UNSPECIFIED WHETHER SCIATICA PRESENT: Primary | ICD-10-CM

## 2024-04-02 DIAGNOSIS — G89.29 CHRONIC MIDLINE LOW BACK PAIN, UNSPECIFIED WHETHER SCIATICA PRESENT: Primary | ICD-10-CM

## 2024-04-02 DIAGNOSIS — G89.29 CHRONIC MIDLINE LOW BACK PAIN, UNSPECIFIED WHETHER SCIATICA PRESENT: ICD-10-CM

## 2024-04-02 DIAGNOSIS — M48.061 SPINAL STENOSIS OF LUMBAR REGION, UNSPECIFIED WHETHER NEUROGENIC CLAUDICATION PRESENT: ICD-10-CM

## 2024-04-02 DIAGNOSIS — J34.89 SORE IN NOSE: ICD-10-CM

## 2024-04-02 DIAGNOSIS — M47.816 LUMBAR SPONDYLOSIS: ICD-10-CM

## 2024-04-02 DIAGNOSIS — M54.50 CHRONIC MIDLINE LOW BACK PAIN, UNSPECIFIED WHETHER SCIATICA PRESENT: ICD-10-CM

## 2024-04-02 DIAGNOSIS — J30.89 NON-SEASONAL ALLERGIC RHINITIS, UNSPECIFIED TRIGGER: ICD-10-CM

## 2024-04-02 PROCEDURE — 99423 OL DIG E/M SVC 21+ MIN: CPT | Mod: ,,, | Performed by: STUDENT IN AN ORGANIZED HEALTH CARE EDUCATION/TRAINING PROGRAM

## 2024-04-02 RX ORDER — SULFAMETHOXAZOLE AND TRIMETHOPRIM 800; 160 MG/1; MG/1
1 TABLET ORAL 2 TIMES DAILY
Qty: 20 TABLET | Refills: 0 | OUTPATIENT
Start: 2024-04-02

## 2024-04-02 RX ORDER — CETIRIZINE HYDROCHLORIDE 10 MG/1
10 TABLET ORAL DAILY
Qty: 90 TABLET | Refills: 3 | Status: SHIPPED | OUTPATIENT
Start: 2024-04-02 | End: 2024-07-01

## 2024-04-02 RX ORDER — GABAPENTIN 600 MG/1
600 TABLET ORAL 3 TIMES DAILY
Qty: 270 TABLET | Refills: 3 | Status: SHIPPED | OUTPATIENT
Start: 2024-04-02 | End: 2025-03-28

## 2024-04-02 RX ORDER — NORTRIPTYLINE HYDROCHLORIDE 50 MG/1
50 CAPSULE ORAL NIGHTLY
Qty: 90 CAPSULE | Refills: 2 | Status: SHIPPED | OUTPATIENT
Start: 2024-04-02 | End: 2025-04-02

## 2024-04-02 NOTE — ADDENDUM NOTE
Addended by: RUDDY MONDRAGON on: 4/2/2024 12:21 PM     Modules accepted: Orders, Level of Service

## 2024-04-02 NOTE — TELEPHONE ENCOUNTER
----- Message from Andre Kirk MD sent at 4/2/2024 11:49 AM CDT -----  Regarding: ordered tens units  Not sure what we have to do but ordered a tens unit for her.

## 2024-04-02 NOTE — TELEPHONE ENCOUNTER
Care Due:                  Date            Visit Type   Department     Provider  --------------------------------------------------------------------------------                                ESTABLISHED                              PATIENT -    [facility]  Last Visit: 03-      [facility]      MEDICINE       Andredaniel Marianojosie  Next Visit: None Scheduled  None         None Found                                                            Last  Test          Frequency    Reason                     Performed    Due Date  --------------------------------------------------------------------------------    Office Visit  6 months...  azelastine, cetirizine,    03- 09-                             losartan, metoprolol,                             montelukast, omeprazole,                             varenicline..............    CMP.........  12 months..  losartan, varenicline....  Not Found    Overdue    [employer] Embedded Care Due Messages. Reference number: [id number].   4/02/2024 11:35:27 AM CDT

## 2024-04-02 NOTE — PROGRESS NOTES
Patient ID: Annie Baird is a 44 y.o. female.    Chief Complaint: Medication Management (Entered automatically based on patient selection in Patient Portal.)          274}  The patient initiated a request through [employer] on 4/2/2024 for evaluation and management with a chief complaint of Medication Management (Entered automatically based on patient selection in Patient Portal.)     I evaluated the questionnaire submission on 04/02/2024 .    Total Time (in minutes): 21     Ohs Peq Evisit Medication    4/2/2024 11:39 AM CDT - Filed by Patient   Do you agree to participate in an E-Visit? Yes   If you have any of the following symptoms, please present to your local emergency room or call 911:  I acknowledge   Medication requests for narcotics will not be addressed via an E-Visit.  Please schedule an appointment. I acknowledge   Are you pregnant, could you be pregnant, or are you breast feeding? Could be pregnant   Do you want to address a new or existing medication? I would like to address a medication I currently take   What is the main issue you would like addressed today? Broken Tens Unit   Would you like to change or continue your medication? Continue medication   What medication would you like to continue?  Tens Unit   Are you taking it as prescribed? Yes    What medical condition is the  medication intended to treat? Back Pain   Provide any additional information you feel is important. My tend unit stopped working, so i was wondering if i could please get a new one   Please attach any relevant images or files    Are you able to take your vital signs? No          Active Problem List with Overview Notes    Diagnosis Date Noted    Sacroiliitis 07/01/2022    Depression, major, recurrent, moderate 07/01/2022    Mixed hyperlipidemia 01/02/2022    S/P placement of VNS (vagus nerve stimulation) device 12/13/2021    Non-seasonal allergic rhinitis 11/12/2021    Degenerative disc disease, lumbar 07/12/2021    Spinal  stenosis of lumbar region 2021    Lumbar spondylosis 2021    Lennox-Gastaut syndrome 2021    B12 deficiency 2021    Chronic pain syndrome 2021    Raynaud's phenomenon 2021    Gastroesophageal reflux disease 2021    Mild intermittent asthma without complication 2021    Seizure disorder 2021    Chronic midline low back pain 2021    Primary hypertension 2021    Hemiparesis affecting left side as late effect of stroke 2021    Debility 2021    ALESSIA (generalized anxiety disorder) 2021    Morbid obesity 2021    Overactive bladder 2021      Recent Labs Obtained:  Lab Results   Component Value Date    WBC 12.47 2022    HGB 12.7 2022    HCT 39.0 2022    MCV 89 2022     2022     2022    K 4.1 2022    GLU 84 2022    CREATININE 0.7 2022    EGFRNORACEVR >60.0 2022    HGBA1C 4.9 2021      Review of patient's allergies indicates:   Allergen Reactions    Depakote [divalproex] Hives    Dilantin [phenytoin sodium extended] Hives    Tramadol      Lowers seizure threshold    Wellbutrin [bupropion hcl]      Lowers seizure threshold       Encounter Diagnoses   Name Primary?    Chronic midline low back pain, unspecified whether sciatica present Yes    Lumbar spondylosis     Spinal stenosis of lumbar region, unspecified whether neurogenic claudication present         No orders of the defined types were placed in this encounter.     Medications Ordered This Encounter   Medications    nortriptyline (PAMELOR) 50 MG capsule     Sig: Take 1 capsule (50 mg total) by mouth every evening.     Dispense:  90 capsule     Refill:  2    TENS units Steffi     Si each by Misc.(Non-Drug; Combo Route) route Daily.     Dispense:  1 each     Refill:  0      Continue gabapentin and he has taking a TCA could consider recent dose of her nortriptyline recommend physical therapy monitor  placed order for TENS unit.   E-Visit Time Tracking:    Day 1 Time (in minutes): 21    Total Time (in minutes): 21      274}

## 2024-04-10 DIAGNOSIS — I10 HYPERTENSION, UNSPECIFIED TYPE: ICD-10-CM

## 2024-04-10 RX ORDER — METOPROLOL SUCCINATE 50 MG/1
50 TABLET, EXTENDED RELEASE ORAL
Qty: 90 TABLET | Refills: 3 | Status: SHIPPED | OUTPATIENT
Start: 2024-04-10

## 2024-04-10 NOTE — TELEPHONE ENCOUNTER
Refill Decision Note   Annie Brie  is requesting a refill authorization.  Brief Assessment and Rationale for Refill:  Approve     Medication Therapy Plan:        Comments:     Note composed:5:16 PM 04/10/2024

## 2024-04-10 NOTE — TELEPHONE ENCOUNTER
No care due was identified.  [facility] Embedded Care Due Messages. Reference number: [id number].   4/10/2024 5:03:03 PM CDT

## 2024-04-29 ENCOUNTER — PATIENT MESSAGE (OUTPATIENT)
Dept: PAIN MEDICINE | Facility: CLINIC | Age: 44
End: 2024-04-29
Payer: COMMERCIAL

## 2024-05-12 ENCOUNTER — PATIENT MESSAGE (OUTPATIENT)
Dept: PAIN MEDICINE | Facility: CLINIC | Age: 44
End: 2024-05-12
Payer: COMMERCIAL

## 2024-05-13 DIAGNOSIS — G89.4 CHRONIC PAIN DISORDER: Primary | ICD-10-CM

## 2024-05-13 DIAGNOSIS — M54.16 LUMBAR RADICULOPATHY: ICD-10-CM

## 2024-05-13 RX ORDER — OXYCODONE AND ACETAMINOPHEN 5; 325 MG/1; MG/1
1 TABLET ORAL EVERY 6 HOURS PRN
Qty: 120 EACH | Refills: 0 | Status: SHIPPED | OUTPATIENT
Start: 2024-05-13 | End: 2024-05-20 | Stop reason: SDUPTHER

## 2024-05-18 ENCOUNTER — PATIENT MESSAGE (OUTPATIENT)
Dept: PAIN MEDICINE | Facility: CLINIC | Age: 44
End: 2024-05-18
Payer: COMMERCIAL

## 2024-05-20 DIAGNOSIS — G89.4 CHRONIC PAIN DISORDER: Primary | ICD-10-CM

## 2024-05-20 DIAGNOSIS — M54.16 LUMBAR RADICULOPATHY: ICD-10-CM

## 2024-05-20 RX ORDER — OXYCODONE AND ACETAMINOPHEN 5; 325 MG/1; MG/1
1 TABLET ORAL EVERY 6 HOURS PRN
Qty: 50 TABLET | Refills: 0 | Status: SHIPPED | OUTPATIENT
Start: 2024-06-01 | End: 2024-06-06 | Stop reason: SDUPTHER

## 2024-05-22 ENCOUNTER — PATIENT MESSAGE (OUTPATIENT)
Dept: FAMILY MEDICINE | Facility: CLINIC | Age: 44
End: 2024-05-22
Payer: COMMERCIAL

## 2024-06-03 ENCOUNTER — PATIENT MESSAGE (OUTPATIENT)
Dept: FAMILY MEDICINE | Facility: CLINIC | Age: 44
End: 2024-06-03
Payer: COMMERCIAL

## 2024-06-03 DIAGNOSIS — N92.6 PERIOD DISORDER: Primary | ICD-10-CM

## 2024-06-06 ENCOUNTER — OFFICE VISIT (OUTPATIENT)
Dept: PAIN MEDICINE | Facility: CLINIC | Age: 44
End: 2024-06-06
Payer: COMMERCIAL

## 2024-06-06 VITALS
HEIGHT: 63 IN | WEIGHT: 259.94 LBS | SYSTOLIC BLOOD PRESSURE: 150 MMHG | BODY MASS INDEX: 46.06 KG/M2 | DIASTOLIC BLOOD PRESSURE: 88 MMHG | HEART RATE: 73 BPM

## 2024-06-06 DIAGNOSIS — M51.34 DDD (DEGENERATIVE DISC DISEASE), THORACIC: ICD-10-CM

## 2024-06-06 DIAGNOSIS — Z79.891 CHRONIC USE OF OPIATE FOR THERAPEUTIC PURPOSE: Primary | ICD-10-CM

## 2024-06-06 DIAGNOSIS — G89.4 CHRONIC PAIN DISORDER: ICD-10-CM

## 2024-06-06 DIAGNOSIS — M51.36 DDD (DEGENERATIVE DISC DISEASE), LUMBAR: ICD-10-CM

## 2024-06-06 DIAGNOSIS — M50.30 DEGENERATIVE DISC DISEASE, CERVICAL: ICD-10-CM

## 2024-06-06 DIAGNOSIS — M54.16 LUMBAR RADICULOPATHY: ICD-10-CM

## 2024-06-06 DIAGNOSIS — M53.3 SACROILIAC JOINT PAIN: ICD-10-CM

## 2024-06-06 PROCEDURE — 99999 PR PBB SHADOW E&M-EST. PATIENT-LVL IV: CPT | Mod: PBBFAC,,, | Performed by: PHYSICIAN ASSISTANT

## 2024-06-06 PROCEDURE — 4010F ACE/ARB THERAPY RXD/TAKEN: CPT | Mod: CPTII,S$GLB,, | Performed by: PHYSICIAN ASSISTANT

## 2024-06-06 PROCEDURE — 1159F MED LIST DOCD IN RCRD: CPT | Mod: CPTII,S$GLB,, | Performed by: PHYSICIAN ASSISTANT

## 2024-06-06 PROCEDURE — 3079F DIAST BP 80-89 MM HG: CPT | Mod: CPTII,S$GLB,, | Performed by: PHYSICIAN ASSISTANT

## 2024-06-06 PROCEDURE — 1160F RVW MEDS BY RX/DR IN RCRD: CPT | Mod: CPTII,S$GLB,, | Performed by: PHYSICIAN ASSISTANT

## 2024-06-06 PROCEDURE — 99214 OFFICE O/P EST MOD 30 MIN: CPT | Mod: S$GLB,,, | Performed by: PHYSICIAN ASSISTANT

## 2024-06-06 PROCEDURE — 80347 BENZODIAZEPINES 13 OR MORE: CPT | Performed by: PHYSICIAN ASSISTANT

## 2024-06-06 PROCEDURE — 3008F BODY MASS INDEX DOCD: CPT | Mod: CPTII,S$GLB,, | Performed by: PHYSICIAN ASSISTANT

## 2024-06-06 PROCEDURE — 3077F SYST BP >= 140 MM HG: CPT | Mod: CPTII,S$GLB,, | Performed by: PHYSICIAN ASSISTANT

## 2024-06-06 PROCEDURE — 80326 AMPHETAMINES 5 OR MORE: CPT | Performed by: PHYSICIAN ASSISTANT

## 2024-06-06 PROCEDURE — 80307 DRUG TEST PRSMV CHEM ANLYZR: CPT | Performed by: PHYSICIAN ASSISTANT

## 2024-06-06 RX ORDER — OXYCODONE AND ACETAMINOPHEN 5; 325 MG/1; MG/1
1 TABLET ORAL EVERY 6 HOURS PRN
Qty: 120 TABLET | Refills: 0 | Status: SHIPPED | OUTPATIENT
Start: 2024-06-14 | End: 2024-07-13

## 2024-06-06 RX ORDER — OXYCODONE AND ACETAMINOPHEN 5; 325 MG/1; MG/1
1 TABLET ORAL EVERY 6 HOURS PRN
Qty: 120 TABLET | Refills: 0 | Status: SHIPPED | OUTPATIENT
Start: 2024-07-13 | End: 2024-08-11
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
## 2024-06-13 ENCOUNTER — PATIENT MESSAGE (OUTPATIENT)
Dept: PAIN MEDICINE | Facility: CLINIC | Age: 44
End: 2024-06-13
Payer: COMMERCIAL

## 2024-06-13 LAB

## 2024-06-19 NOTE — TELEPHONE ENCOUNTER
[facility]  OPERATIVE NOTE    Date of Operation:   6/19/2024    Providers Performing:   Surgeon(s):  Ernie Rubio III, MD  Assistant: MADHURI Leblanc, I certify that the services for which payment is claimed were medically necessary, and that no qualified resident was available to perform the services.       Operative Procedure:   Right Total Knee Arthroplasty, using Depuy POPSUGAR robotic assisted solution, CPT 08578  Computer-assisted surgical navigational procedure for musculoskeletal procedures, image-less, CPT 76603  Surgical techniques requiring use of robotic surgical system, CPT     -22 modifier for CDC class 2 or higher obesity (BMI 35.9) and 20 degree pre-op flexion contracture requiring prolonged operative time and increased case complexity and difficulty      Preoperative Diagnosis:   Right Knee Osteoarthritis, ICD-10 M17.11    Postoperative Diagnosis:   SAME    Components Used:   Depuy  Femur: Attune PS Size 5  Tibia: Attune all poly AOX PS Size 5 x  6mm  Patella: Attune Medialized Dome 35 mm    2 packs cement: Simplex P    Implant Name Type Inv. Item Serial No.  Lot No. LRB No. Used Action   CEMENT BONE SIMPLEX HV RADPQ - [id number]  CEMENT BONE SIMPLEX HV RADPQ [id number] [employer]Â®. [id number] Right 2 Implanted   PATELLA ATTUNE MEDLZD DOME 35 - [id number]  PATELLA ATTUNE MEDLZD DOME 35 [id number] [employer] [id number] Right 1 Implanted   COMP ATTUNE PS KARTHIK FEM R SZ 5 - [id number]  COMP ATTUNE PS KARTHIK FEM R SZ 5 [id number] [employer] [id number] Right 1 Implanted   ATTUNE KNEE SYSTEM ALL POLY, TIBIAL IMPLANT, POST STAB, CEMENTED, sz 5, 6mm   [id number]  [id number] Right 1 Implanted       Anesthesia:   Spinal    KEELY cocktail: Rubio Block, no toradol    Estimated Blood Loss:   350 cc    Specimens:   None    Complications:   None    Indications:   The patient has failed non-operative measures including medications, injections and activity modifications for their knee.  After an explanation of risks and  Spoke to patient and confirmed times, dates, and locations for imaging and VV f/u with Dr. Corona. I also explained how VV works, pt understands.      ----- Message from Deana Farfan sent at 7/21/2022  9:58 AM CDT -----  Regarding: misssed call  Contact: pt  Pt returning missed call from office       Confirmed patient's contact info below:  Contact Name: Annie Baird  Phone Number: [phone]         benefits of knee arthroplasty surgery, the patient wishes to proceed with surgery.    Operative Notes:   The patient was greeted in the pre-op holding area.  The patients knee was marked with a surgical marker by the surgeon.  Anesthesia per above technique was administered by the anesthesia team.  The patient was placed in the supine position on the OR table with all bony prominences padded.  A tourniquet was not used.  IV antibiotics were administered.  The leg was prepped and draped in the usual sterile fashion.  A timeout was performed and the correct patient, site and procedure were identified.    A midline incision was made with a standard medial parapatellar arthrotomy.  The standard medial capsular release was performed along with the lateral gutter.  The patella was mobilized from the lateral parapatellar ligament and bony osteophytes were removed.  The intercondylar notch was cleared of the ACL and PCL.    The femoral and tibial guide pins where placed and the arrays were positioned and tightened to the pins. The hip was then brought through a range of motion and the hip center was identified, medial and lateral malleolus were identified and the tibia and femur were registered.     Using a tibia first with tensioner technique the joint was first tensioned manually in extension and flexion and through the full range of motion to define our gaps. Provisional cuts/implants were positioned virtually for appropriate size gaps and implant placement.    The VELYS robot was then brought into position and checkpoints were confirmed. Care was taken during the burring process to protect the soft tissue. We cut the tibia. The tensioner was then placed in the joint and the knee was again extension and flexion and through the full range of motion to define our gaps. The cuts/implants were positioned virtually for appropriate size gaps and implant placement and the femoral cuts were then made.     The PS box was cut.  Meniscal remnants were removed and the knee was brought into flexion and posterior osteophytes were removed.      At this time the trial implants were placed and knee assessed for stability, and flexion arc. The patella was prepared using free-hand technique and the three-holed lug drill guide was sized and appropriately assessed. Patella tracking was found to be acceptable. The tibial component rotation was marked. The trials and guide pins were removed. The tibial component was positioned and the keel was drilled and punched.    The wound was copiously irrigated with pulsitile lavage and the bony surfaces dried.  Cement was mixed on the back table and applied to all components.  Cementation of the tibial, femoral, and patellar components was performed sequentially with removal of all excess cement. While the cement dried and a 0.35% betadine solution was left to soak in the surgical field.     After the cement was dry hemostasis was obtained with bovie electrocautery.  The knee was again copiously irrigated with pulsatile lavage.     1 gram of vancomycin powder was placed in the knee. The arthrotomy was closed with interrupted #1 vicryl suture and #2 quill, the subcuticular layer was closed with 2-0 vicryl suture and a running 4-0 monocryl was used to closed the skin surface.  Pin sites were closed with 4-0 monocryl and skin glue. Surgicel sealant was placed over the top of the incision and sterile dressing placed over the wound. Appropriately sized TEDs hose were placed for edema control.     Sponge and needle counts were correct.    The first-assistant was critical to all steps of the operation, including retraction and leg stabilization during exposure and bone preparation, as well as the deep and superficial wound closure.  Dr. Rubio performed and/or supervised the key portions of this surgical procedure, including evaluation of the bone cuts, reshaping of the bony elements, and insertion of the provisional  and final components.    Postoperative Plan:  DVT Prophylaxis: The patient will be anticoagulated with 81mg aspirin x1 dose @ 2100 on POD#0 then Eliquis 2.5mg BID x30 days starting 0900 POD#1    They will receive 24 hours of post-operative antibiotics.    Activity will be weight bearing as tolerated.    No cbrex        Due patient and or surgical factors the patient will receive an Rx for cefadroxil 500mg BID x7 days after discharge per [place] data:   [place]a MM, Ann HERNANDEZ, Jasmin LOBATO, Bill BLOOD. [facility] Clinical Research Award: Extended Oral Antibiotics Prevent Periprosthetic Joint Infection in High-Risk Cases: 3855 Patients With 1-Year Follow-Up. J Arthroplasty. 2021 Jul;36(7S):S18-S25. doi: 10.1016/j.arth.2021.01.051. Epub 2021 Jan 23. PMID: [id number].      Signed by: Ernie Rubio III, MD

## 2024-06-23 DIAGNOSIS — J32.9 SINUSITIS, UNSPECIFIED CHRONICITY, UNSPECIFIED LOCATION: ICD-10-CM

## 2024-06-23 NOTE — TELEPHONE ENCOUNTER
Care Due:                  Date            Visit Type   Department     Provider  --------------------------------------------------------------------------------                                ESTABLISHED                              PATIENT -    [facility]  Last Visit: 03-      [place]      MEDICINE       Andredaniel Kirk  Next Visit: None Scheduled  None         None Found                                                            Last  Test          Frequency    Reason                     Performed    Due Date  --------------------------------------------------------------------------------    Office Visit  15 months..  azelastine, cetirizine,    03- 06-                             losartan, metoprolol,                             montelukast,                             nortriptyline, omeprazole    CMP.........  12 months..  losartan.................  Not Found    Overdue    [employer] Embedded Care Due Messages. Reference number: [id number].   6/23/2024 2:48:06 AM CDT

## 2024-06-24 RX ORDER — AZELASTINE 1 MG/ML
2 SPRAY, METERED NASAL 2 TIMES DAILY
Qty: 90 ML | Refills: 11 | Status: SHIPPED | OUTPATIENT
Start: 2024-06-24

## 2024-07-11 ENCOUNTER — PATIENT MESSAGE (OUTPATIENT)
Dept: FAMILY MEDICINE | Facility: CLINIC | Age: 44
End: 2024-07-11
Payer: COMMERCIAL

## 2024-07-29 DIAGNOSIS — K21.9 GASTROESOPHAGEAL REFLUX DISEASE, UNSPECIFIED WHETHER ESOPHAGITIS PRESENT: ICD-10-CM

## 2024-07-29 RX ORDER — OMEPRAZOLE 40 MG/1
40 CAPSULE, DELAYED RELEASE ORAL EVERY MORNING
Qty: 90 CAPSULE | Refills: 3 | Status: SHIPPED | OUTPATIENT
Start: 2024-07-29

## 2024-07-29 NOTE — TELEPHONE ENCOUNTER
No care due was identified.  [facility] Embedded Care Due Messages. Reference number: [id number].   7/29/2024 1:51:06 AM CDT

## 2024-07-31 ENCOUNTER — OFFICE VISIT (OUTPATIENT)
Dept: FAMILY MEDICINE | Facility: CLINIC | Age: 44
End: 2024-07-31
Payer: MEDICARE

## 2024-07-31 ENCOUNTER — LAB VISIT (OUTPATIENT)
Dept: LAB | Facility: HOSPITAL | Age: 44
End: 2024-07-31
Attending: STUDENT IN AN ORGANIZED HEALTH CARE EDUCATION/TRAINING PROGRAM
Payer: MEDICARE

## 2024-07-31 VITALS
SYSTOLIC BLOOD PRESSURE: 120 MMHG | HEIGHT: 63 IN | DIASTOLIC BLOOD PRESSURE: 72 MMHG | TEMPERATURE: 99 F | OXYGEN SATURATION: 99 % | HEART RATE: 70 BPM | WEIGHT: 264.56 LBS | RESPIRATION RATE: 17 BRPM | BODY MASS INDEX: 46.88 KG/M2

## 2024-07-31 DIAGNOSIS — R79.9 ABNORMAL FINDING OF BLOOD CHEMISTRY, UNSPECIFIED: ICD-10-CM

## 2024-07-31 DIAGNOSIS — R21 RASH: Primary | ICD-10-CM

## 2024-07-31 DIAGNOSIS — G40.909 SEIZURE DISORDER: ICD-10-CM

## 2024-07-31 DIAGNOSIS — Z12.11 COLON CANCER SCREENING: ICD-10-CM

## 2024-07-31 DIAGNOSIS — F17.290 OTHER TOBACCO PRODUCT NICOTINE DEPENDENCE, UNCOMPLICATED: ICD-10-CM

## 2024-07-31 DIAGNOSIS — F33.1 DEPRESSION, MAJOR, RECURRENT, MODERATE: ICD-10-CM

## 2024-07-31 DIAGNOSIS — I10 PRIMARY HYPERTENSION: ICD-10-CM

## 2024-07-31 DIAGNOSIS — Z12.31 ENCOUNTER FOR SCREENING MAMMOGRAM FOR MALIGNANT NEOPLASM OF BREAST: ICD-10-CM

## 2024-07-31 DIAGNOSIS — M46.1 SACROILIITIS: ICD-10-CM

## 2024-07-31 DIAGNOSIS — E66.01 MORBID OBESITY: ICD-10-CM

## 2024-07-31 DIAGNOSIS — G89.29 CHRONIC MIDLINE LOW BACK PAIN, UNSPECIFIED WHETHER SCIATICA PRESENT: ICD-10-CM

## 2024-07-31 DIAGNOSIS — M54.50 CHRONIC MIDLINE LOW BACK PAIN, UNSPECIFIED WHETHER SCIATICA PRESENT: ICD-10-CM

## 2024-07-31 DIAGNOSIS — I69.354 HEMIPARESIS AFFECTING LEFT SIDE AS LATE EFFECT OF STROKE: ICD-10-CM

## 2024-07-31 DIAGNOSIS — Z00.00 ENCOUNTER FOR PREVENTIVE HEALTH EXAMINATION: ICD-10-CM

## 2024-07-31 LAB
ALBUMIN SERPL BCP-MCNC: 4.1 G/DL (ref 3.5–5.2)
ALP SERPL-CCNC: 107 U/L (ref 55–135)
ALT SERPL W/O P-5'-P-CCNC: 17 U/L (ref 10–44)
ANION GAP SERPL CALC-SCNC: 15 MMOL/L (ref 8–16)
AST SERPL-CCNC: 19 U/L (ref 10–40)
BASOPHILS # BLD AUTO: 0.01 K/UL (ref 0–0.2)
BASOPHILS NFR BLD: 0.1 % (ref 0–1.9)
BILIRUB SERPL-MCNC: 0.3 MG/DL (ref 0.1–1)
BUN SERPL-MCNC: 10 MG/DL (ref 6–20)
CALCIUM SERPL-MCNC: 10 MG/DL (ref 8.7–10.5)
CHLORIDE SERPL-SCNC: 102 MMOL/L (ref 95–110)
CHOLEST SERPL-MCNC: 269 MG/DL (ref 120–199)
CHOLEST/HDLC SERPL: 5.3 {RATIO} (ref 2–5)
CO2 SERPL-SCNC: 22 MMOL/L (ref 23–29)
CREAT SERPL-MCNC: 0.9 MG/DL (ref 0.5–1.4)
DIFFERENTIAL METHOD BLD: ABNORMAL
EOSINOPHIL # BLD AUTO: 0 K/UL (ref 0–0.5)
EOSINOPHIL NFR BLD: 0 % (ref 0–8)
ERYTHROCYTE [DISTWIDTH] IN BLOOD BY AUTOMATED COUNT: 13.2 % (ref 11.5–14.5)
EST. GFR  (NO RACE VARIABLE): >60 ML/MIN/1.73 M^2
ESTIMATED AVG GLUCOSE: 105 MG/DL (ref 68–131)
GLUCOSE SERPL-MCNC: 103 MG/DL (ref 70–110)
HBA1C MFR BLD: 5.3 % (ref 4–5.6)
HCT VFR BLD AUTO: 41.4 % (ref 37–48.5)
HDLC SERPL-MCNC: 51 MG/DL (ref 40–75)
HDLC SERPL: 19 % (ref 20–50)
HGB BLD-MCNC: 12.5 G/DL (ref 12–16)
IMM GRANULOCYTES # BLD AUTO: 0.02 K/UL (ref 0–0.04)
IMM GRANULOCYTES NFR BLD AUTO: 0.2 % (ref 0–0.5)
LDLC SERPL CALC-MCNC: 188.6 MG/DL (ref 63–159)
LYMPHOCYTES # BLD AUTO: 2.3 K/UL (ref 1–4.8)
LYMPHOCYTES NFR BLD: 21.2 % (ref 18–48)
MCH RBC QN AUTO: 26.7 PG (ref 27–31)
MCHC RBC AUTO-ENTMCNC: 30.2 G/DL (ref 32–36)
MCV RBC AUTO: 89 FL (ref 82–98)
MONOCYTES # BLD AUTO: 0.6 K/UL (ref 0.3–1)
MONOCYTES NFR BLD: 5.5 % (ref 4–15)
NEUTROPHILS # BLD AUTO: 7.8 K/UL (ref 1.8–7.7)
NEUTROPHILS NFR BLD: 73 % (ref 38–73)
NONHDLC SERPL-MCNC: 218 MG/DL
NRBC BLD-RTO: 0 /100 WBC
PLATELET # BLD AUTO: 273 K/UL (ref 150–450)
PMV BLD AUTO: 9.8 FL (ref 9.2–12.9)
POTASSIUM SERPL-SCNC: 4.1 MMOL/L (ref 3.5–5.1)
PROT SERPL-MCNC: 8.6 G/DL (ref 6–8.4)
RBC # BLD AUTO: 4.68 M/UL (ref 4–5.4)
SODIUM SERPL-SCNC: 139 MMOL/L (ref 136–145)
TRIGL SERPL-MCNC: 147 MG/DL (ref 30–150)
TSH SERPL DL<=0.005 MIU/L-ACNC: 0.54 UIU/ML (ref 0.4–4)
WBC # BLD AUTO: 10.74 K/UL (ref 3.9–12.7)

## 2024-07-31 PROCEDURE — 80053 COMPREHEN METABOLIC PANEL: CPT | Mod: HCNC | Performed by: STUDENT IN AN ORGANIZED HEALTH CARE EDUCATION/TRAINING PROGRAM

## 2024-07-31 PROCEDURE — 99407 BEHAV CHNG SMOKING > 10 MIN: CPT | Mod: HCNC,S$GLB,, | Performed by: STUDENT IN AN ORGANIZED HEALTH CARE EDUCATION/TRAINING PROGRAM

## 2024-07-31 PROCEDURE — 85025 COMPLETE CBC W/AUTO DIFF WBC: CPT | Mod: HCNC | Performed by: STUDENT IN AN ORGANIZED HEALTH CARE EDUCATION/TRAINING PROGRAM

## 2024-07-31 PROCEDURE — 84443 ASSAY THYROID STIM HORMONE: CPT | Mod: HCNC | Performed by: STUDENT IN AN ORGANIZED HEALTH CARE EDUCATION/TRAINING PROGRAM

## 2024-07-31 PROCEDURE — 3008F BODY MASS INDEX DOCD: CPT | Mod: HCNC,CPTII,S$GLB, | Performed by: STUDENT IN AN ORGANIZED HEALTH CARE EDUCATION/TRAINING PROGRAM

## 2024-07-31 PROCEDURE — 99214 OFFICE O/P EST MOD 30 MIN: CPT | Mod: HCNC,25,S$GLB, | Performed by: STUDENT IN AN ORGANIZED HEALTH CARE EDUCATION/TRAINING PROGRAM

## 2024-07-31 PROCEDURE — 36415 COLL VENOUS BLD VENIPUNCTURE: CPT | Mod: HCNC,PO | Performed by: STUDENT IN AN ORGANIZED HEALTH CARE EDUCATION/TRAINING PROGRAM

## 2024-07-31 PROCEDURE — 83036 HEMOGLOBIN GLYCOSYLATED A1C: CPT | Mod: HCNC | Performed by: STUDENT IN AN ORGANIZED HEALTH CARE EDUCATION/TRAINING PROGRAM

## 2024-07-31 PROCEDURE — 99999 PR PBB SHADOW E&M-EST. PATIENT-LVL IV: CPT | Mod: PBBFAC,HCNC,, | Performed by: STUDENT IN AN ORGANIZED HEALTH CARE EDUCATION/TRAINING PROGRAM

## 2024-07-31 PROCEDURE — 80061 LIPID PANEL: CPT | Mod: HCNC | Performed by: STUDENT IN AN ORGANIZED HEALTH CARE EDUCATION/TRAINING PROGRAM

## 2024-07-31 PROCEDURE — 4010F ACE/ARB THERAPY RXD/TAKEN: CPT | Mod: HCNC,CPTII,S$GLB, | Performed by: STUDENT IN AN ORGANIZED HEALTH CARE EDUCATION/TRAINING PROGRAM

## 2024-07-31 PROCEDURE — 3078F DIAST BP <80 MM HG: CPT | Mod: HCNC,CPTII,S$GLB, | Performed by: STUDENT IN AN ORGANIZED HEALTH CARE EDUCATION/TRAINING PROGRAM

## 2024-07-31 PROCEDURE — 1159F MED LIST DOCD IN RCRD: CPT | Mod: HCNC,CPTII,S$GLB, | Performed by: STUDENT IN AN ORGANIZED HEALTH CARE EDUCATION/TRAINING PROGRAM

## 2024-07-31 PROCEDURE — 3074F SYST BP LT 130 MM HG: CPT | Mod: HCNC,CPTII,S$GLB, | Performed by: STUDENT IN AN ORGANIZED HEALTH CARE EDUCATION/TRAINING PROGRAM

## 2024-07-31 NOTE — PATIENT INSTRUCTIONS
Hi Summer,     If you are due for any health screening(s) below please notify me so we can arrange them to be ordered and scheduled. Most healthy patients at your age complete them, but you are free to accept or refuse.     If you can't do it, I'll definitely understand. If you can, I'd certainly appreciate it!    Tests to Keep You Healthy    Mammogram: ORDERED BUT NOT SCHEDULED  Colon Cancer Screening: ORDERED  Cervical Cancer Screening: Met on 7/28/2022  Last Blood Pressure <= 139/89 (7/31/2024): Yes  Tobacco Cessation: NO      Schedule your breast cancer screening today     Breast cancer is the second most common cancer in women,  and the second leading cause of death from cancer. Mammograms can detect breast cancer early, which significantly increases the chances of curing the cancer.       Our records indicate that you may be overdue for breast cancer screening. Cancer screenings save lives, so schedule yours today to stay healthy.     If you recently had a mammogram performed outside of [facility], please let your Health care team know so that they can update your health record.        Its time for your colon cancer screening     Colorectal cancer is one of the leading causes of cancer death for men and women but it doesnt have to be. Screenings can prevent colorectal cancer or find it early enough to treat and cure the disease.     Our records indicate that you may be overdue for colon cancer screening. A colonoscopy or stool screening test can help identify patients at risk for developing colon cancer. Cancer screenings save lives, so schedule yours today to stay healthy.     A colonoscopy is the preferred test for detecting colon cancer. It is needed only once every 10 years if results are negative. While you are sedated, a flexible, lighted tube with a tiny camera is inserted into the rectum and advanced through the colon to look for cancers.     An alternative screening test that is used at  home and returned to the lab may also be used. It detects hidden blood in bowel movements which could indicate cancer in the colon. If results are positive, you will need a colonoscopy to determine if the blood is a sign of cancer. This type of follow up (diagnostic) colonoscopy usually requires additional copays as required by your insurance provider.     If you recently had your colon cancer screening performed outside of [facility], please let your Health care team know so that they can update your health record. Please contact your PCP if you have any questions.    Were here to help you quit smoking     Our records indicated that you are still smoking. One of the best things you can do for your health is to stop smoking and we are here to help.     Talk with your provider about our Smoking Cessation Program and how we can support you on your journey.

## 2024-07-31 NOTE — PROGRESS NOTES
[facility] Note    Subjective:    The HPI and pertinent ROS is included in the Diagnostic Impression Remarks section at the end of the note. Please see below for further details. Chief complaint is at end of note.     Summer is a pleasant intelligent patient who is here for evaluation.     Modified Medications    No medications on file       Data reviewed 274}  Previous medical records reviewed and summarized in plan section at end of note.      If you are due for any health screening(s) below please notify me so we can arrange them to be ordered and scheduled. Most healthy patients at your age complete them, but you are free to accept or refuse. If you can't do it, I'll definitely understand. If you can, I'd certainly appreciate it!     Tests to Keep You Healthy    Mammogram: SCHEDULED  Colon Cancer Screening: ORDERED  Cervical Cancer Screening: Met on 2022  Last Blood Pressure <= 139/89 (2024): Yes  Tobacco Cessation: NO      The following portions of the patient's history were reviewed and updated as appropriate: allergies, current medications, past family history, past medical history, past social history, past surgical history and problem list.    She  has a past medical history of Asthma, GERD (gastroesophageal reflux disease), Hypertension, Seizures, and Traumatic tear of left rotator cuff.  She  has a past surgical history that includes Joint replacement;  section; Appendectomy; VAGAL NERVE STIMULATOR placement (Left, 2021); Epidural steroid injection into lumbar spine (N/A, 2021); Injection of joint (Left, 2022); Replacement of battery of vagus nerve stimulator (Left, 2022); Bilateral tubal ligation; Epidural steroid injection (N/A, 3/16/2023); and Epidural steroid injection into lumbar spine (N/A, 2023).    She  reports that she has been smoking vaping with nicotine and cigarettes. She has a 30 pack-year smoking history. She has been exposed to  "tobacco smoke. She has never used smokeless tobacco. She reports that she does not currently use alcohol. She reports that she does not use drugs.  She family history includes COPD in her father and mother; Cancer in her father; Heart disease in her mother; Hyperlipidemia in her mother; Hypertension in her mother; Stroke in her mother.    Review of patient's allergies indicates:   Allergen Reactions    Depakote [divalproex] Hives    Dilantin [phenytoin sodium extended] Hives    Tramadol      Lowers seizure threshold    Wellbutrin [bupropion hcl]      Lowers seizure threshold       Tobacco Use: High Risk (7/31/2024)    Patient History     Smoking Tobacco Use: Every Day     Smokeless Tobacco Use: Never     Passive Exposure: Current     Physical Examination  Physical Exam       General appearance: alert, cooperative, no distress  Neck: no thyromegaly, no neck stiffness  Lungs: clear to auscultation, no wheezes, rales or rhonchi, symmetric air entry  Heart: normal rate, regular rhythm, normal S1, S2, no murmurs, rubs, clicks or gallops  Abdomen: soft, nontender, nondistended, no rigidity, rebound, or guarding.   Back: no point tenderness over spine  Extremities: peripheral pulses normal, no unilateral leg swelling or calf tenderness   Neurological:alert, oriented, normal speech, no new focal findings or movement disorder noted from baseline      BP Readings from Last 3 Encounters:   07/31/24 120/72   06/06/24 (!) 150/88   02/16/24 127/77     Wt Readings from Last 3 Encounters:   07/31/24 120 kg (264 lb 8.8 oz)   06/06/24 117.9 kg (259 lb 14.8 oz)   02/16/24 117.9 kg (260 lb)     /72 (BP Location: Right arm, Patient Position: Sitting, BP Method: Large (Manual))   Pulse 70   Temp 99 °F (37.2 °C) (Oral)   Resp 17   Ht 5' 3" (1.6 m)   Wt 120 kg (264 lb 8.8 oz)   SpO2 99%   BMI 46.86 kg/m²    274}  Laboratory: I have reviewed old labs below:    274}    Lab Results   Component Value Date    WBC 12.47 09/14/2022 "    HGB 12.7 09/14/2022    HCT 39.0 09/14/2022    MCV 89 09/14/2022     09/14/2022     09/14/2022    K 4.1 09/14/2022     09/14/2022    CALCIUM 9.3 09/14/2022    CO2 24 09/14/2022    GLU 84 09/14/2022    BUN 9 09/14/2022    CREATININE 0.7 09/14/2022    EGFRNORACEVR >60.0 09/14/2022    ANIONGAP 10 09/14/2022    PROT 7.4 11/29/2021    ALBUMIN 4.0 11/29/2021    BILITOT 0.4 11/29/2021    ALKPHOS 77 11/29/2021    ALT 17 11/29/2021    AST 20 11/29/2021    INR 1.0 09/14/2022    CHOL 243 (H) 05/07/2021    TRIG 204 (H) 05/07/2021    HDL 49 05/07/2021    LDLCALC 153.2 05/07/2021    TSH 0.570 11/29/2021    HGBA1C 4.9 05/07/2021      Lab reviewed by me: Particular labs of significance that I will monitor, workup, or treat to improve are mentioned below in diagnostic impression remarks.    Results         Imaging/EKG: I have reviewed the pertinent results and my findings are noted in remarks.  274}    CC:   Chief Complaint   Patient presents with    Annual Exam    Rash        274}    History of Present Illness  The patient is a 44-year-old female who presents for follow-up.    She reports a rash that began as three small bumps on her hand approximately 4 to 5 months ago. These bumps have since grown in size and number, with the most recent appearing a few months ago. She describes a prickly sensation upon touch and pain in the smaller bump, but not the larger one. She has no similar rashes elsewhere on her body and no history of bites or trauma. She mentions having a cat that plays with her arm. Initially, she suspected the rash might be due to exposure to fresh water [place], but the persistence of the rash led her to seek medical attention.    She has been managing her back pain effectively with injections.      Her heartburn symptoms have been well-controlled.    She attempted to use Cologuard for colon cancer screening, but the kit was incomplete.    She has made progress in reducing  her smoking habit, now using a vaporizer with a nicotine content of 1 mg instead of the previous 6 mg.       Assessment/Plan  Annie Baird is a 44 y.o. female who presents to clinic with:  1. Rash    2. Chronic midline low back pain, unspecified whether sciatica present    3. Primary hypertension    4. Encounter for preventive health examination    5. Abnormal finding of blood chemistry, unspecified    6. Encounter for screening mammogram for malignant neoplasm of breast    7. Colon cancer screening    8. Other tobacco product nicotine dependence, uncomplicated    9. Morbid obesity    10. Depression, major, recurrent, moderate    11. Seizure disorder    12. Hemiparesis affecting left side as late effect of stroke    13. Sacroiliitis       274}    Assessment & Plan  1. Skin lesion on hand.  The patient presents with a skin lesion on the right hand that has been growing over the past 4-5 months. It started as three small ashley and has since increased in size, with one part being painful. The lesion does not appear to be fungal or bacterial in nature. An e-consult with a dermatologist will be initiated for further evaluation. A picture of the lesion has been taken and sent to the dermatologist. The patient will be informed of the dermatologist's feedback, and a biopsy may be recommended if necessary. A topical cream may be prescribed based on the dermatologist's recommendations.    2. Back pain.  The patient reports that her back pain is currently under control with the use of injections. She will continue with her current treatment regimen.    3. Seizure disorder.  The patient recently underwent a 4-day at-home EEG due to an increase in seizure activity. She will continue to follow up with her seizure doctor for further management.    4. Health Maintenance.  The patient is due for blood work, which was last done in 2022. Blood work will be ordered to follow up on her overall health. She is also due for a  colon cancer screening. A new order for a Cologuard test will be placed as the previous test kit was incomplete and not used.     Sacroiliitis-stable rec continue current meds    Morbid obesity-recommend healthy diet, exercise, and monitor A1c, lipids, liver enzymes, and TSH.   Major depression disorder-stable continue current meds and will monitor no SI/plan.  Seizure disorder stable continue med s  Hypertension -stable continue current meds monitor no headache or chest pain f/u blood work   Hemiparesis left 2/2 stroke stable no new deficits       Nicotine dependence-needs improvement recommend smoking cessation offered medications patient wants to hold off on meds.   Assistance with smoking cessation was offered, including:  [x]  Medications  [x]  Counseling  []  Printed Information on Smoking Cessation  []  Referral to a Smoking Cessation Program    Patient was counseled regarding smoking for >10 minutes.    This note was generated with the assistance of ambient listening technology. Verbal consent was obtained by the patient and accompanying visitor(s) for the recording of patient appointment to facilitate this note. I attest to having reviewed and edited the generated note for accuracy, though some syntax or spelling errors may persist. Please contact the author of this note for any clarification.      1. Rash  - E-Consult to Dermatology    2. Chronic midline low back pain, unspecified whether sciatica present    3. Primary hypertension  - CBC Auto Differential; Future  - Comprehensive Metabolic Panel; Future  - Hemoglobin A1C; Future  - TSH; Future    4. Encounter for preventive health examination    5. Abnormal finding of blood chemistry, unspecified  - CBC Auto Differential; Future  - Comprehensive Metabolic Panel; Future  - Hemoglobin A1C; Future  - TSH; Future  - Lipid Panel; Future    6. Encounter for screening mammogram for malignant neoplasm of breast  - Mammo Digital Screening Bilat w/ Pasquale;  Future    7. Colon cancer screening  - Cologuard Screening (Multitarget Stool DNA); Future  - Cologuard Screening (Multitarget Stool DNA)  - Cologuard Screening (Multitarget Stool DNA); Future  - Cologuard Screening (Multitarget Stool DNA)    8. Other tobacco product nicotine dependence, uncomplicated    9. Morbid obesity    10. Depression, major, recurrent, moderate    11. Seizure disorder    12. Hemiparesis affecting left side as late effect of stroke    13. Sacroiliitis      Andre Kirk MD   274}    If you are due for any health screening(s) below please notify me so we can arrange them to be ordered and scheduled. Most healthy patients at your age complete them, but you are free to accept or refuse.     If you can't do it, I'll definitely understand. If you can, I'd certainly appreciate it!   Tests to Keep You Healthy    Mammogram: SCHEDULED  Colon Cancer Screening: ORDERED  Cervical Cancer Screening: Met on 7/28/2022  Last Blood Pressure <= 139/89 (7/31/2024): Yes  Tobacco Cessation: NO

## 2024-08-01 ENCOUNTER — E-CONSULT (OUTPATIENT)
Dept: DERMATOLOGY | Facility: CLINIC | Age: 44
End: 2024-08-01

## 2024-08-01 ENCOUNTER — PATIENT MESSAGE (OUTPATIENT)
Dept: FAMILY MEDICINE | Facility: CLINIC | Age: 44
End: 2024-08-01
Payer: MEDICARE

## 2024-08-01 ENCOUNTER — PATIENT MESSAGE (OUTPATIENT)
Dept: PAIN MEDICINE | Facility: CLINIC | Age: 44
End: 2024-08-01
Payer: MEDICARE

## 2024-08-01 ENCOUNTER — TELEPHONE (OUTPATIENT)
Dept: FAMILY MEDICINE | Facility: CLINIC | Age: 44
End: 2024-08-01
Payer: MEDICARE

## 2024-08-01 DIAGNOSIS — M54.16 LUMBAR RADICULOPATHY: ICD-10-CM

## 2024-08-01 DIAGNOSIS — E78.2 MIXED HYPERLIPIDEMIA: Primary | ICD-10-CM

## 2024-08-01 DIAGNOSIS — L92.0 GRANULOMA ANNULARE: Primary | ICD-10-CM

## 2024-08-01 DIAGNOSIS — G89.4 CHRONIC PAIN DISORDER: ICD-10-CM

## 2024-08-01 DIAGNOSIS — E78.2 MIXED HYPERLIPIDEMIA: ICD-10-CM

## 2024-08-01 RX ORDER — ROSUVASTATIN CALCIUM 20 MG/1
20 TABLET, COATED ORAL DAILY
Qty: 90 TABLET | Refills: 3 | Status: SHIPPED | OUTPATIENT
Start: 2024-08-01 | End: 2024-08-01 | Stop reason: SDUPTHER

## 2024-08-01 RX ORDER — ROSUVASTATIN CALCIUM 20 MG/1
20 TABLET, COATED ORAL DAILY
Qty: 90 TABLET | Refills: 3 | Status: SHIPPED | OUTPATIENT
Start: 2024-08-01 | End: 2025-08-01

## 2024-08-01 NOTE — TELEPHONE ENCOUNTER
I am sending in 1 month. She will need to be evaluated in 1 month with Dr. Evans as she is a previous patient of Dr. Baird

## 2024-08-01 NOTE — CONSULTS
[facility]  Response for E-Consult     Patient Name: Annie Baird  MRN: [id number]  Primary Care Provider: Andre Kirk MD   Requesting Provider: Andre Kirk MD  E-Consult to Dermatology  Consult performed by: Tenisha Chirinos MD  Consult ordered by: Andre Kirk MD  Reason for consult: Rash  Assessment/Recommendations: Thank you for this consult.  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 I have had good luck melting these away from intralesional kenalog 10 mg/ml. These often occur in diabetes or hypercholesterolemia          Recommendation: as above    Contingency that warrants a repeat eConsult or referral: none     Total time of Consultation: 5 minute    I did not speak to the requesting provider verbally about this.     *This eConsult is based on the clinical data available to me and is furnished without benefit of a physical examination. The eConsult will need to be interpreted in light of any clinical issues or changes in patient status not available to me at the time of filing this eConsults. Significant changes in patient condition or level of acuity should result in immediate formal consultation and reevaluation. Please alert me if you have further questions.    Thank you for this eConsult referral.     Tenisha Chirinos MD  [facility]

## 2024-08-01 NOTE — TELEPHONE ENCOUNTER
No care due was identified.  [facility] Embedded Care Due Messages. Reference number: [id number].   8/01/2024 3:36:27 PM CDT

## 2024-08-01 NOTE — TELEPHONE ENCOUNTER
----- Message from Andre Kirk MD sent at 8/1/2024  6:53 AM CDT -----  Please let pt know her cholesterol came back very high and most doctors would recommend a cholesterol medication if she is open

## 2024-08-02 ENCOUNTER — TELEPHONE (OUTPATIENT)
Dept: FAMILY MEDICINE | Facility: CLINIC | Age: 44
End: 2024-08-02
Payer: MEDICARE

## 2024-08-02 ENCOUNTER — PATIENT MESSAGE (OUTPATIENT)
Dept: FAMILY MEDICINE | Facility: CLINIC | Age: 44
End: 2024-08-02
Payer: MEDICARE

## 2024-08-02 DIAGNOSIS — L92.0 GRANULOMA ANNULARE: Primary | ICD-10-CM

## 2024-08-02 NOTE — TELEPHONE ENCOUNTER
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 I have had good luck melting these away from intralesional kenalog 10 mg/ml. These often occur in diabetes or hypercholesterolemia. If she wants to see derm for the injection can place ref

## 2024-08-05 RX ORDER — OXYCODONE AND ACETAMINOPHEN 5; 325 MG/1; MG/1
1 TABLET ORAL EVERY 6 HOURS PRN
Qty: 120 TABLET | Refills: 0 | Status: SHIPPED | OUTPATIENT
Start: 2024-08-11 | End: 2024-09-09

## 2024-08-09 ENCOUNTER — PATIENT MESSAGE (OUTPATIENT)
Dept: NEUROLOGY | Facility: CLINIC | Age: 44
End: 2024-08-09
Payer: MEDICARE

## 2024-08-22 ENCOUNTER — E-VISIT (OUTPATIENT)
Dept: INTERNAL MEDICINE | Facility: CLINIC | Age: 44
End: 2024-08-22
Payer: MEDICARE

## 2024-08-22 ENCOUNTER — PATIENT MESSAGE (OUTPATIENT)
Dept: FAMILY MEDICINE | Facility: CLINIC | Age: 44
End: 2024-08-22
Payer: MEDICARE

## 2024-08-22 DIAGNOSIS — N30.00 ACUTE CYSTITIS WITHOUT HEMATURIA: Primary | ICD-10-CM

## 2024-08-22 RX ORDER — NITROFURANTOIN 25; 75 MG/1; MG/1
100 CAPSULE ORAL 2 TIMES DAILY
Qty: 10 CAPSULE | Refills: 0 | Status: SHIPPED | OUTPATIENT
Start: 2024-08-22

## 2024-08-22 RX ORDER — PHENAZOPYRIDINE HYDROCHLORIDE 200 MG/1
200 TABLET, FILM COATED ORAL 3 TIMES DAILY PRN
Qty: 15 TABLET | Refills: 0 | Status: SHIPPED | OUTPATIENT
Start: 2024-08-22 | End: 2024-09-01

## 2024-08-22 NOTE — PROGRESS NOTES
Patient ID: Annie Baird is a 44 y.o. female.    Chief Complaint: Urinary Tract Infection (Entered automatically based on patient selection in [employer].)    The patient initiated a request through [employer] on 8/22/2024 for evaluation and management with a chief complaint of Urinary Tract Infection (Entered automatically based on patient selection in [employer].)     I evaluated the questionnaire submission on 8/22/24.    [facility] P[facility] Evisit Uti Questionnaire    8/22/2024 11:42 AM CDT - Filed by Patient   Do you agree to participate in an E-Visit? Yes   If you have any of the following symptoms, please present to your local emergency room or call 911:  I acknowledge   Choose the state of your primary residence [place]   Are you pregnant, could you be pregnant, or are you breast feeding? None of the above   What is the main issue you would like addressed today? UTI   What symptoms do you currently have? Change in urine appearance or smell   When did your symptoms first appear? 8/20/2024   List what you have done or taken to help your symptoms. drink cranberry juice   Please indicate whether you have had the following symptoms during the past 24 hours     Urgent urination (a sudden and uncontrollable urge to urinate) Severe   Frequent urination of small amounts of urine (going to the toilet very often) Severe   Burning pain when urinating Moderate   Incomplete bladder emptying (still feel like you need to urinate again after urination) Severe   Pain not associated with urination in the lower abdomen below the belly button) Mild   What does your urine look like? I am not sure   Blood seen in the urine None   Flank pain (pain in one or both sides of the lower back) None   Abnormal Vaginal Discharge (abnormal amount, color and/or odor) None   Discharge from the urethra (urinary opening) without urination None   High body temperature/fever? None-<99.5   Please rate how much discomfort you have experience because of the  symptoms in the past 24 hours: Moderate   Please indicate how the symptoms have interfered with your every day activities/work in the past 24 hours: Mild   Please indicate how these symptoms have interfered with your social activities (visiting people, meeting with friends, etc.) in the past 24 hours? None   Are you a diabetic? No   Please indicate whether you have the following at the time of completion of this questionnaire: None of the above   Provide any additional information you feel is important. Flori had several uti during my lufe and im 99.9% sure i have a UTI   Please attach any relevant images or files (if you have performed a home test for UTI, please submit a photo of results)    Are you able to take your vital signs? No         No diagnosis found.     No orders of the defined types were placed in this encounter.           No follow-ups on file.      E-Visit Time Tracking:

## 2024-08-22 NOTE — ADDENDUM NOTE
Addended by: JOSE DAHL on: 8/22/2024 11:29 AM     Modules accepted: Orders     Abnormal Lactate: > 2

## 2024-08-23 DIAGNOSIS — G40.909 SEIZURE DISORDER: ICD-10-CM

## 2024-08-23 RX ORDER — CANNABIDIOL 100 MG/ML
SOLUTION ORAL
Qty: 540 ML | Refills: 4 | Status: SHIPPED | OUTPATIENT
Start: 2024-08-23

## 2024-08-23 NOTE — TELEPHONE ENCOUNTER
Spoke with pt. Informed her that being that we haven't seen her since December, a follow up appt is needed for any future refills. Follow up appt scheduled 10/25/24 at 10:30am with Dr. Haas. Patient verbalized understanding.

## 2024-09-03 ENCOUNTER — PATIENT MESSAGE (OUTPATIENT)
Dept: PAIN MEDICINE | Facility: CLINIC | Age: 44
End: 2024-09-03
Payer: MEDICARE

## 2024-09-04 ENCOUNTER — E-VISIT (OUTPATIENT)
Dept: FAMILY MEDICINE | Facility: CLINIC | Age: 44
End: 2024-09-04
Payer: MEDICARE

## 2024-09-04 ENCOUNTER — OFFICE VISIT (OUTPATIENT)
Dept: PAIN MEDICINE | Facility: CLINIC | Age: 44
End: 2024-09-04
Payer: MEDICARE

## 2024-09-04 ENCOUNTER — PATIENT MESSAGE (OUTPATIENT)
Dept: FAMILY MEDICINE | Facility: CLINIC | Age: 44
End: 2024-09-04
Payer: MEDICARE

## 2024-09-04 VITALS
DIASTOLIC BLOOD PRESSURE: 83 MMHG | HEART RATE: 78 BPM | SYSTOLIC BLOOD PRESSURE: 134 MMHG | WEIGHT: 264.56 LBS | HEIGHT: 63 IN | BODY MASS INDEX: 46.88 KG/M2

## 2024-09-04 DIAGNOSIS — M54.16 LUMBAR RADICULOPATHY: ICD-10-CM

## 2024-09-04 DIAGNOSIS — N30.00 ACUTE CYSTITIS WITHOUT HEMATURIA: Primary | ICD-10-CM

## 2024-09-04 DIAGNOSIS — M50.30 DDD (DEGENERATIVE DISC DISEASE), CERVICAL: ICD-10-CM

## 2024-09-04 DIAGNOSIS — M54.9 DORSALGIA, UNSPECIFIED: Primary | ICD-10-CM

## 2024-09-04 PROCEDURE — 99999 PR PBB SHADOW E&M-EST. PATIENT-LVL III: CPT | Mod: PBBFAC,,, | Performed by: ANESTHESIOLOGY

## 2024-09-04 PROCEDURE — 3079F DIAST BP 80-89 MM HG: CPT | Mod: HCNC,CPTII,S$GLB, | Performed by: ANESTHESIOLOGY

## 2024-09-04 PROCEDURE — 3008F BODY MASS INDEX DOCD: CPT | Mod: HCNC,CPTII,S$GLB, | Performed by: ANESTHESIOLOGY

## 2024-09-04 PROCEDURE — 99215 OFFICE O/P EST HI 40 MIN: CPT | Mod: HCNC,S$GLB,, | Performed by: ANESTHESIOLOGY

## 2024-09-04 PROCEDURE — 4010F ACE/ARB THERAPY RXD/TAKEN: CPT | Mod: HCNC,CPTII,S$GLB, | Performed by: ANESTHESIOLOGY

## 2024-09-04 PROCEDURE — 3044F HG A1C LEVEL LT 7.0%: CPT | Mod: HCNC,CPTII,S$GLB, | Performed by: ANESTHESIOLOGY

## 2024-09-04 PROCEDURE — 3075F SYST BP GE 130 - 139MM HG: CPT | Mod: HCNC,CPTII,S$GLB, | Performed by: ANESTHESIOLOGY

## 2024-09-04 RX ORDER — OXYCODONE AND ACETAMINOPHEN 5; 325 MG/1; MG/1
1 TABLET ORAL
Qty: 14 TABLET | Refills: 0 | Status: SHIPPED | OUTPATIENT
Start: 2024-10-07 | End: 2024-11-06

## 2024-09-04 RX ORDER — SULFAMETHOXAZOLE AND TRIMETHOPRIM 800; 160 MG/1; MG/1
1 TABLET ORAL 2 TIMES DAILY
Qty: 6 TABLET | Refills: 0 | Status: SHIPPED | OUTPATIENT
Start: 2024-09-04 | End: 2024-09-07

## 2024-09-04 RX ORDER — OXYCODONE AND ACETAMINOPHEN 5; 325 MG/1; MG/1
1 TABLET ORAL EVERY 12 HOURS PRN
Qty: 28 TABLET | Refills: 0 | Status: SHIPPED | OUTPATIENT
Start: 2024-09-23 | End: 2024-10-23

## 2024-09-04 RX ORDER — OXYCODONE AND ACETAMINOPHEN 5; 325 MG/1; MG/1
1 TABLET ORAL EVERY 8 HOURS PRN
Qty: 42 TABLET | Refills: 0 | Status: SHIPPED | OUTPATIENT
Start: 2024-09-09 | End: 2024-10-09

## 2024-09-04 NOTE — LETTER
September 4, 2024      [facility]  [address] [place] [address] [address] [address]  Phone: [phone]  Fax: [phone]       Patient: Annie Baird   YOB: 1980  Date of Visit: 09/04/2024    To Whom It May Concern:    Hodan Baird  was at [facility] on 09/04/2024. The patient was transported to and from clinic by Chris Almonte. If you have any questions or concerns, or if I can be of further assistance, please do not hesitate to contact me.    Sincerely,    Sylvie Guerra LPN

## 2024-09-04 NOTE — PROGRESS NOTES
Patient ID: Annie Baird is a 44 y.o. female.    Chief Complaint: Urinary Tract Infection (Entered automatically based on patient selection in [employer].)          274}  The patient initiated a request through [employer] on 9/4/2024 for evaluation and management with a chief complaint of Urinary Tract Infection (Entered automatically based on patient selection in [employer].)     I evaluated the questionnaire submission on 09/04/2024 .    Total Time (in minutes): 7     [facility] Peq Evisit Uti Questionnaire    9/4/2024 12:21 PM CDT - Filed by Patient   Do you agree to participate in an E-Visit? Yes   If you have any of the following symptoms, please present to your local emergency room or call 911:  I acknowledge   Choose the state of your primary residence [place]   Are you pregnant, could you be pregnant, or are you breast feeding? None of the above   What is the main issue you would like addressed today? Uti / bladder infection   What symptoms do you currently have? Pain while passing urine;  Change in urine appearance or smell   When did your symptoms first appear? 8/19/2024   List what you have done or taken to help your symptoms. Antibiotics / cranberry juice / lots of water   Please indicate whether you have had the following symptoms during the past 24 hours     Urgent urination (a sudden and uncontrollable urge to urinate) Mild   Frequent urination of small amounts of urine (going to the toilet very often) Mild   Burning pain when urinating Mild   Incomplete bladder emptying (still feel like you need to urinate again after urination) Mild   Pain not associated with urination in the lower abdomen below the belly button) Mild   What does your urine look like? Cloudy   Blood seen in the urine None   Flank pain (pain in one or both sides of the lower back) None   Abnormal Vaginal Discharge (abnormal amount, color and/or odor) None   Discharge from the urethra (urinary opening) without urination None   High body  temperature/fever? Yes, mild-99.6 F-100.2 F   Please rate how much discomfort you have experience because of the symptoms in the past 24 hours: Mild   Please indicate how the symptoms have interfered with your every day activities/work in the past 24 hours: Mild   Please indicate how these symptoms have interfered with your social activities (visiting people, meeting with friends, etc.) in the past 24 hours? None   Are you a diabetic? No   Please indicate whether you have the following at the time of completion of this questionnaire: Signs of menopause syndrome (hot flashes)   Provide any additional information you feel is important. Have gotten better since I took the last antibiotic but still have a little symptoms   Please attach any relevant images or files (if you have performed a home test for UTI, please submit a photo of results)    Are you able to take your vital signs? 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74 07/31/2024    HGB 12.5 07/31/2024    HCT 41.4 07/31/2024    MCV 89 07/31/2024     07/31/2024     07/31/2024    K 4.1 07/31/2024     07/31/2024    CREATININE 0.9 07/31/2024    EGFRNORACEVR >60.0 07/31/2024    HGBA1C 5.3 07/31/2024    TSH 0.541 07/31/2024      Review of patient's allergies indicates:   Allergen Reactions    Depakote [divalproex] Hives    Dilantin [phenytoin sodium extended] Hives    Tramadol      Lowers seizure threshold    Wellbutrin [bupropion hcl]      Lowers seizure threshold       Encounter Diagnosis   Name Primary?    Acute cystitis without hematuria Yes        No orders of the defined types were placed in this encounter.     Medications Ordered This Encounter   Medications    sulfamethoxazole-trimethoprim 800-160mg (BACTRIM DS) 800-160 mg Tab     Sig: Take 1 tablet by mouth 2 (two) times daily. for 3 days     Dispense:  6 tablet     Refill:  0        E-Visit Time Tracking:    Day 1 Time (in minutes): 7    Total Time (in minutes): 7      274}

## 2024-09-05 DIAGNOSIS — G40.909 SEIZURE DISORDER: ICD-10-CM

## 2024-09-05 RX ORDER — LEVETIRACETAM 750 MG/1
1500 TABLET ORAL 2 TIMES DAILY
Qty: 360 TABLET | Refills: 0 | Status: SHIPPED | OUTPATIENT
Start: 2024-09-05

## 2024-09-05 NOTE — PROGRESS NOTES
This note was completed with dictation software and grammatical errors may exist.      FOLLOW UP NOTE:     CHIEF COMPLAINT: neck, back, leg, and arm pain    INTERVAL HISTORY OF PRESENT ILLNESS: Annie Baird is a 43 y.o. female with PMH significant for seizure disorder s/p vagal nerve stimulator placement, HTN, hx of anxiety on chronic benzodiazepines, hx of left shoulder arthroscopic surgery (12/4/2012), hx of right CTS release (11/22/2013), hx of multiple CVA's (residual left sided weakness per patient and daughter report), narcolepsy, insomnia, and hx of C5-C6 ACDF (5/1/2019) presents as a new patient to me but has seen Dr. Lebron in the past.  Main complaint is low back pain.   Patient continues to report of lower back pain that radiates down BLE. The patient also reports recent fall that has increased pain in left gluteal region. The patient also reports of left shoulder and shoulder blade pain.  The patient reports that activity in general worsens her pain. Patient has completed PT in the past with mild benefit. The patient continues at home exercises with mild relief. The patient denies of any significant changes in her health since her last appointment. The patient also denies of any changes in the character of her pain since her last appointment. The patient reports that her current pain is a 8/10. Patient denies of any urinary/fecal incontinence, saddle anesthesia, or weakness.        PRIOR HPI: Annie Baird is a 41 y.o. female with PMH significant for seizure disorder s/p vagal nerve stimulator placement, HTN, hx of anxiety on chronic benzodiazepines, hx of left shoulder arthroscopic surgery (12/4/2012), hx of right CTS release (11/22/2013), hx of multiple CVA's (residual left sided weakness per patient and daughter report), narcolepsy, insomnia, and hx of C5-C6 ACDF (5/1/2019) presents as a referral for the evaluation of multiple pain complaints. The patient reports that her low back pain  is the worst of her pains. The patient has had a rather extensive work-up in [place], and I have put the relevant diagnostic tests in my note below. In summary:     The patient reports that her pain began approximately 1 year ago when she experienced low back pain. The patient denies of any inciting incident or trauma. The patient reports of worsening pain over time. She reported of progressive symptoms that extended into her lower extremities. She eventually presented to the hospital for evaluation of possible cauda equina syndrome. CT myelogram results copied below. In regards to her pain: the patient localizes her pain to center of her lower back. The patient reports of radiation to her bilateral buttocks and down the posterior aspect of her RLE to her foot. The patient describes her pain as a sharp, stinging, and burning type of pain. The patient reports of numbness in her left 2nd, 3rd, and 4th digits in her hand and her 1st and 2nd toes bilaterally. The patient reports that her pain is a 6/10. Patient denies of any fecal incontinence or saddle anesthesia. The patient reports of daily episodes of urinary incontinence which has been ongoing for the past few months. The patient reports of requiring the assistance of a wheelchair for ambulation for the past few months.      Aggravating factors: activity in general     Mitigating factors: laying on her left side     Relevant Surgeries: yes; history of cervical spine surgery X 1     Interventional Therapies: yes; Dr. Chun Pat in [place]. Dr. Pat did RFA's and epidurals. No benefit.       INTERVENTIONAL PAIN HISTORY:  9/22/2023: Interlaminar PATRICIA at L5-S1- 50% relief.   3/16/2023: Interlaminar PATRICIA at L5-S1 - moderate relief.    7/12/2021:  Lumbar interlaminar epidural steroid injection at L5-S1 under fluoroscopic guidance (left paramedian approach) - improvement for a few days        ROS:  Review of Systems   Constitutional:  Negative for chills and fever.   HENT:   Negative for sore throat.    Eyes:  Negative for visual disturbance.   Respiratory:  Negative for shortness of breath.    Cardiovascular:  Negative for chest pain.   Gastrointestinal:  Negative for nausea and vomiting.   Genitourinary:  Negative for difficulty urinating.   Musculoskeletal:  Positive for arthralgias, back pain, gait problem, myalgias and neck pain.   Skin:  Negative for rash.   Allergic/Immunologic: Negative for immunocompromised state.   Neurological:  Positive for seizures, weakness and numbness. Negative for syncope.   Hematological:  Does not bruise/bleed easily.   Psychiatric/Behavioral:  Negative for suicidal ideas.    All other systems reviewed and are negative.       MEDICAL, SURGICAL, FAMILY, SOCIAL HX: reviewed    MEDICATIONS/ALLERGIES: reviewed    PHYSICAL EXAM:    GENERAL: A and O x3, the patient appears well groomed and is in no acute distress.  Skin: No rashes or obvious lesions  HEENT: normocephalic, atraumatic  LUNGS: non labored breathing  ABDOMEN: non distended  UPPER EXTREMITIES and lower extremities. Normal ROM  CERVICAL SPINE:  Cervical spine: ROM is full in flexion, extension and lateral rotation without increased pain.    LUMBAR SPINE  Lumbar spine: ROM is limited with flexion extension and oblique extension with mild-to-moderate increased pain.    Moderate TTP b/l lumbar paraspinous muscles  MENTAL STATUS: normal orientation, speech, language, and fund of knowledge for social situation.  Emotional state appropriate.    GAIT: normal rise, base, steps, and arm swing.      GAIT: antalgic gait; unsteady gait    IMAGING:    3/2/2023: Cervical MRI: FINDINGS:  Cervical spine complete five views: There is a vagal stimulator.  There is ACDF with a plate vertebral body screws and a disc implant at C5-C6.  There is DJD.  There is mild neural foraminal narrowing at C5-C6.  No fracture dislocation bone destruction seen.  No complication or hardware failure seen.    3/2/23: XRAY LEFT  SHOULDER  FINDINGS:  Shoulder trauma three views left: There is postoperative change of the glenoid.  There is a vagal stimulator.  There is postoperative change of the C-spine.  There is DJD of the glenohumeral joint.  No acute fracture dislocation bone destruction seen.    ASSESSMENT: Annie Baird is a 41 y.o. female with PMH significant for seizure disorder s/p vagal nerve stimulator placement, HTN, hx of anxiety on chronic benzodiazepines, hx of left shoulder arthroscopic surgery (12/4/2012), hx of right CTS release (11/22/2013), hx of multiple CVA's (residual left sided weakness per patient and daughter report), narcolepsy, insomnia, and hx of C5-C6 ACDF (5/1/2019) presents   1. Dorsalgia, unspecified    2. Lumbar radiculopathy    3. DDD (degenerative disc disease), cervical          PLAN:  I have stressed the importance of physical activity and exercise to improve overall health  Reviewed pertinent imaging and records with patient  Order updated CT L-spine  May consider repeat Lumbar PATRICIA once imaging if reviewed  As for her opioid medications, I do not recommend continue chronic opioid use due to risk of opioid tolerance, dependence and hyperalgesia.  She agreeable to wean down and titrate off her medications over the next 6 weeks  Will contact patient with imaging results

## 2024-09-09 ENCOUNTER — PATIENT MESSAGE (OUTPATIENT)
Dept: FAMILY MEDICINE | Facility: CLINIC | Age: 44
End: 2024-09-09
Payer: MEDICARE

## 2024-09-09 DIAGNOSIS — M54.9 DORSALGIA: Primary | ICD-10-CM

## 2024-09-18 DIAGNOSIS — G40.909 SEIZURE DISORDER: ICD-10-CM

## 2024-09-18 RX ORDER — LAMOTRIGINE 150 MG/1
300 TABLET ORAL 2 TIMES DAILY
Qty: 360 TABLET | Refills: 0 | Status: SHIPPED | OUTPATIENT
Start: 2024-09-18

## 2024-09-18 RX ORDER — LAMOTRIGINE 25 MG/1
TABLET ORAL
Qty: 180 TABLET | Refills: 0 | Status: SHIPPED | OUTPATIENT
Start: 2024-09-18

## 2024-10-08 ENCOUNTER — PATIENT MESSAGE (OUTPATIENT)
Dept: FAMILY MEDICINE | Facility: CLINIC | Age: 44
End: 2024-10-08
Payer: MEDICARE

## 2024-10-08 DIAGNOSIS — I10 HYPERTENSION, UNSPECIFIED TYPE: ICD-10-CM

## 2024-10-08 DIAGNOSIS — M54.50 CHRONIC MIDLINE LOW BACK PAIN, UNSPECIFIED WHETHER SCIATICA PRESENT: ICD-10-CM

## 2024-10-08 DIAGNOSIS — G40.909 SEIZURE DISORDER: ICD-10-CM

## 2024-10-08 DIAGNOSIS — J30.89 NON-SEASONAL ALLERGIC RHINITIS, UNSPECIFIED TRIGGER: ICD-10-CM

## 2024-10-08 DIAGNOSIS — G89.29 CHRONIC MIDLINE LOW BACK PAIN, UNSPECIFIED WHETHER SCIATICA PRESENT: ICD-10-CM

## 2024-10-08 DIAGNOSIS — K21.9 GASTROESOPHAGEAL REFLUX DISEASE, UNSPECIFIED WHETHER ESOPHAGITIS PRESENT: ICD-10-CM

## 2024-10-08 DIAGNOSIS — E78.2 MIXED HYPERLIPIDEMIA: ICD-10-CM

## 2024-10-08 RX ORDER — LAMOTRIGINE 25 MG/1
TABLET ORAL
Qty: 180 TABLET | Refills: 0 | Status: CANCELLED | OUTPATIENT
Start: 2024-10-08

## 2024-10-08 RX ORDER — LEVETIRACETAM 750 MG/1
1500 TABLET ORAL 2 TIMES DAILY
Qty: 360 TABLET | Refills: 0 | Status: CANCELLED | OUTPATIENT
Start: 2024-10-08

## 2024-10-08 RX ORDER — MONTELUKAST SODIUM 10 MG/1
10 TABLET ORAL NIGHTLY
Qty: 90 TABLET | Refills: 10 | Status: SHIPPED | OUTPATIENT
Start: 2024-10-08

## 2024-10-08 RX ORDER — NORTRIPTYLINE HYDROCHLORIDE 50 MG/1
50 CAPSULE ORAL NIGHTLY
Qty: 90 CAPSULE | Refills: 2 | Status: SHIPPED | OUTPATIENT
Start: 2024-10-08 | End: 2025-10-08

## 2024-10-08 RX ORDER — ROSUVASTATIN CALCIUM 20 MG/1
20 TABLET, COATED ORAL DAILY
Qty: 90 TABLET | Refills: 3 | Status: SHIPPED | OUTPATIENT
Start: 2024-10-08 | End: 2025-10-08

## 2024-10-08 RX ORDER — CANNABIDIOL 100 MG/ML
SOLUTION ORAL
Qty: 540 ML | Refills: 4 | Status: CANCELLED | OUTPATIENT
Start: 2024-10-08

## 2024-10-08 RX ORDER — OMEPRAZOLE 40 MG/1
40 CAPSULE, DELAYED RELEASE ORAL EVERY MORNING
Qty: 90 CAPSULE | Refills: 3 | Status: SHIPPED | OUTPATIENT
Start: 2024-10-08

## 2024-10-08 RX ORDER — LAMOTRIGINE 150 MG/1
300 TABLET ORAL 2 TIMES DAILY
Qty: 360 TABLET | Refills: 0 | Status: CANCELLED | OUTPATIENT
Start: 2024-10-08

## 2024-10-08 RX ORDER — CETIRIZINE HYDROCHLORIDE 10 MG/1
10 TABLET ORAL DAILY
Qty: 90 TABLET | Refills: 3 | Status: SHIPPED | OUTPATIENT
Start: 2024-10-08 | End: 2025-01-06

## 2024-10-08 RX ORDER — GABAPENTIN 600 MG/1
600 TABLET ORAL 3 TIMES DAILY
Qty: 270 TABLET | Refills: 3 | Status: SHIPPED | OUTPATIENT
Start: 2024-10-08 | End: 2025-10-03

## 2024-10-08 RX ORDER — METOPROLOL SUCCINATE 50 MG/1
50 TABLET, EXTENDED RELEASE ORAL DAILY
Qty: 90 TABLET | Refills: 3 | Status: SHIPPED | OUTPATIENT
Start: 2024-10-08

## 2024-10-08 RX ORDER — LOSARTAN POTASSIUM 50 MG/1
50 TABLET ORAL NIGHTLY
Qty: 90 TABLET | Refills: 0 | Status: SHIPPED | OUTPATIENT
Start: 2024-10-08

## 2024-10-08 NOTE — TELEPHONE ENCOUNTER
No care due was identified.  [facility] Embedded Care Due Messages. Reference number: [id number].   10/08/2024 2:37:01 PM CDT

## 2024-10-17 ENCOUNTER — PATIENT MESSAGE (OUTPATIENT)
Dept: FAMILY MEDICINE | Facility: CLINIC | Age: 44
End: 2024-10-17
Payer: MEDICARE

## 2024-10-17 DIAGNOSIS — G40.909 SEIZURE DISORDER: Primary | ICD-10-CM

## 2024-10-17 DIAGNOSIS — R51.9 NONINTRACTABLE HEADACHE, UNSPECIFIED CHRONICITY PATTERN, UNSPECIFIED HEADACHE TYPE: ICD-10-CM

## 2024-10-17 DIAGNOSIS — J32.9 SINUSITIS, UNSPECIFIED CHRONICITY, UNSPECIFIED LOCATION: Primary | ICD-10-CM

## 2024-10-17 DIAGNOSIS — N30.00 ACUTE CYSTITIS WITHOUT HEMATURIA: Primary | ICD-10-CM

## 2024-10-18 ENCOUNTER — PATIENT MESSAGE (OUTPATIENT)
Dept: FAMILY MEDICINE | Facility: CLINIC | Age: 44
End: 2024-10-18
Payer: MEDICARE

## 2024-10-18 NOTE — TELEPHONE ENCOUNTER
Pt is requesting that her Lamictal and Keppra be refilled until she is able to establish with a new neurologist. Please advise.

## 2024-10-21 ENCOUNTER — E-VISIT (OUTPATIENT)
Dept: FAMILY MEDICINE | Facility: CLINIC | Age: 44
End: 2024-10-21
Payer: MEDICARE

## 2024-10-21 ENCOUNTER — PATIENT MESSAGE (OUTPATIENT)
Dept: FAMILY MEDICINE | Facility: CLINIC | Age: 44
End: 2024-10-21

## 2024-10-21 ENCOUNTER — PATIENT MESSAGE (OUTPATIENT)
Dept: FAMILY MEDICINE | Facility: CLINIC | Age: 44
End: 2024-10-21
Payer: MEDICARE

## 2024-10-21 DIAGNOSIS — R35.0 URINARY FREQUENCY: ICD-10-CM

## 2024-10-21 DIAGNOSIS — N39.46 MIXED INCONTINENCE: ICD-10-CM

## 2024-10-21 DIAGNOSIS — R35.1 NOCTURIA: ICD-10-CM

## 2024-10-21 DIAGNOSIS — R35.1 NOCTURIA: Primary | ICD-10-CM

## 2024-10-21 DIAGNOSIS — R30.0 DYSURIA: Primary | ICD-10-CM

## 2024-10-22 RX ORDER — SOLIFENACIN SUCCINATE 10 MG/1
10 TABLET, FILM COATED ORAL DAILY
Qty: 90 TABLET | Refills: 0 | Status: SHIPPED | OUTPATIENT
Start: 2024-10-22

## 2024-10-22 NOTE — PROGRESS NOTES
Patient ID: Annie Baird is a 44 y.o. female.    Chief Complaint: Urinary Tract Infection (Entered automatically based on patient selection in [employer].)          274}  The patient initiated a request through [employer] on 10/21/2024 for evaluation and management with a chief complaint of Urinary Tract Infection (Entered automatically based on patient selection in [employer].)     I evaluated the questionnaire submission on 10/22/2024 .    Total Time (in minutes): 7     Ohs Peq Evisit Uti Questionnaire    10/21/2024  6:58 PM CDT - Filed by Patient   Do you agree to participate in an E-Visit? Yes   If you have any of the following symptoms, please present to your local emergency room or call 911:  I acknowledge   Choose the state of your primary residence [place]   Are you pregnant, could you be pregnant, or are you breast feeding? None of the above   What is the main issue you would like addressed today? Bladder   What symptoms do you currently have? Pain while passing urine   When did your symptoms first appear? 10/1/2024   List what you have done or taken to help your symptoms. Cranberry juice / lots of liquids   Please indicate whether you have had the following symptoms during the past 24 hours     Urgent urination (a sudden and uncontrollable urge to urinate) Severe   Frequent urination of small amounts of urine (going to the toilet very often) Mild   Burning pain when urinating None   Incomplete bladder emptying (still feel like you need to urinate again after urination) Moderate   Pain not associated with urination in the lower abdomen below the belly button) None   What does your urine look like? I am not sure   Blood seen in the urine None   Flank pain (pain in one or both sides of the lower back) None   Abnormal Vaginal Discharge (abnormal amount, color and/or odor) None   Discharge from the urethra (urinary opening) without urination None   High body temperature/fever? None-<99.5   Please rate how  much discomfort you have experience because of the symptoms in the past 24 hours: Mild   Please indicate how the symptoms have interfered with your every day activities/work in the past 24 hours: Moderate   Please indicate how these symptoms have interfered with your social activities (visiting people, meeting with friends, etc.) in the past 24 hours? Mild   Are you a diabetic? No   Please indicate whether you have the following at the time of completion of this questionnaire: Menstruation (menses)   Provide any additional information you feel is important. I have been out of my bladder medication and can't get a refill until I see my new urologist and it won't be for a couple months so I don't know if there's a possibility of getting that prescription refilled   Please attach any relevant images or files (if you have performed a home test for UTI, please submit a photo of results)    Are you able to take your vital signs? 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74 07/31/2024    HGB 12.5 07/31/2024    HCT 41.4 07/31/2024    MCV 89 07/31/2024     07/31/2024     07/31/2024    K 4.1 07/31/2024     07/31/2024    CREATININE 0.9 07/31/2024    EGFRNORACEVR >60.0 07/31/2024    HGBA1C 5.3 07/31/2024    TSH 0.541 07/31/2024      Review of patient's allergies indicates:   Allergen Reactions    Depakote [divalproex] Hives    Dilantin [phenytoin sodium extended] Hives    Tramadol      Lowers seizure threshold    Wellbutrin [bupropion hcl]      Lowers seizure threshold       Encounter Diagnoses   Name Primary?    Dysuria Yes    Nocturia     Urinary frequency     Mixed incontinence         No orders of the defined types were placed in this encounter.     Medications Ordered This Encounter   Medications    solifenacin (VESICARE) 10 MG tablet     Sig: Take 1 tablet (10 mg total) by mouth once daily.     Dispense:  90 tablet     Refill:  0        E-Visit Time Tracking:    Day 1 Time (in minutes): 7    Total Time (in minutes): 7      274}

## 2024-10-23 ENCOUNTER — TELEPHONE (OUTPATIENT)
Dept: ADMINISTRATIVE | Facility: CLINIC | Age: 44
End: 2024-10-23
Payer: MEDICARE

## 2024-10-23 NOTE — TELEPHONE ENCOUNTER
Called pt; informed pt I was calling to confirm her virtual EAWV on 10/25/24 at 2:00pm and to see if she needed any help; pt stated she did not need any help and would complete e-pre check later today; pt informed to login 10 minutes prior to appt time

## 2024-10-25 ENCOUNTER — PATIENT MESSAGE (OUTPATIENT)
Dept: FAMILY MEDICINE | Facility: CLINIC | Age: 44
End: 2024-10-25
Payer: MEDICARE

## 2024-11-04 ENCOUNTER — PATIENT MESSAGE (OUTPATIENT)
Dept: FAMILY MEDICINE | Facility: CLINIC | Age: 44
End: 2024-11-04

## 2024-11-04 ENCOUNTER — E-VISIT (OUTPATIENT)
Dept: FAMILY MEDICINE | Facility: CLINIC | Age: 44
End: 2024-11-04
Payer: MEDICARE

## 2024-11-04 DIAGNOSIS — N30.00 ACUTE CYSTITIS WITHOUT HEMATURIA: Primary | ICD-10-CM

## 2024-11-04 RX ORDER — PHENAZOPYRIDINE HYDROCHLORIDE 200 MG/1
200 TABLET, FILM COATED ORAL 3 TIMES DAILY PRN
Qty: 9 TABLET | Refills: 0 | Status: SHIPPED | OUTPATIENT
Start: 2024-11-04 | End: 2024-11-07

## 2024-11-04 RX ORDER — SULFAMETHOXAZOLE AND TRIMETHOPRIM 800; 160 MG/1; MG/1
1 TABLET ORAL 2 TIMES DAILY
Qty: 6 TABLET | Refills: 0 | Status: SHIPPED | OUTPATIENT
Start: 2024-11-04 | End: 2024-11-07

## 2024-11-04 NOTE — PROGRESS NOTES
Patient ID: Annie Baird is a 44 y.o. female.    Chief Complaint: General Illness (Entered automatically based on patient selection in [employer].)          274}  The patient initiated a request through [employer] on 11/4/2024 for evaluation and management with a chief complaint of General Illness (Entered automatically based on patient selection in [employer].)     I evaluated the questionnaire submission on 11/04/2024 .    Total Time (in minutes): 11     [facility]    11/4/2024 10:30 AM CST - Filed by Patient   What do you need help with? Urinary Symptoms   Do you agree to participate in an E-Visit? Yes   If you have any of the following symptoms, please present to your local emergency room or call 911:  I acknowledge   Select all that apply: None of the above   What is the main issue you would like addressed today? UTI & or Bladder Infection   What symptoms do you currently have? Pain while passing urine;  Change in urine appearance or smell   When did your symptoms first appear? 10/23/2024   List what you have done or taken to help your symptoms. Juices and eater   Please indicate whether you have had the following symptoms during the past 24 hours     Urgent urination (a sudden and uncontrollable urge to urinate) Severe   Frequent urination of small amounts of urine (going to the toilet very often) Severe   Burning pain when urinating Moderate   Incomplete bladder emptying (still feel like you need to urinate again after urination) Severe   Pain not associated with urination in the lower abdomen below the belly button) Mild   What does your urine look like? Cloudy   Blood seen in the urine None   Flank pain (pain in one or both sides of the lower back) None   Abnormal Vaginal Discharge (abnormal amount, color and/or odor) None   Discharge from the urethra (urinary opening) without urination None   High body temperature/fever? Yes, mild-99.6 F-100.2 F   Please rate how much  discomfort you have experience because of the symptoms in the past 24 hours: Moderate   Please indicate how the symptoms have interfered with your every day activities/work in the past 24 hours: Mild   Please indicate how these symptoms have interfered with your social activities (visiting people, meeting with friends, etc.) in the past 24 hours? Mild   Are you a diabetic? No   Please indicate whether you have the following at the time of completion of this questionnaire: Signs of menopause syndrome (hot flashes)   Provide any additional information you feel is important.    Please attach any relevant images or files (if you have performed a home test for UTI, please submit a photo of results)    Are you able to take your vital signs? 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    B12 deficiency 05/18/2021    Chronic pain syndrome 05/18/2021    Raynaud's phenomenon 05/18/2021    Gastroesophageal reflux disease 05/07/2021    Mild intermittent asthma without complication 05/07/2021    Seizure disorder 05/07/2021    Chronic midline low back pain 05/07/2021    Primary hypertension 05/07/2021    Hemiparesis affecting left side as late effect of stroke 05/07/2021    Debility 05/07/2021    ALESSIA (generalized anxiety disorder) 05/07/2021    Morbid obesity 05/07/2021    Overactive bladder 05/07/2021      Recent Labs Obtained:  Lab Results   Component Value Date    WBC 10.74 07/31/2024    HGB 12.5 07/31/2024    HCT 41.4 07/31/2024    MCV 89 07/31/2024     07/31/2024     07/31/2024    K 4.1  07/31/2024     07/31/2024    CREATININE 0.9 07/31/2024    EGFRNORACEVR >60.0 07/31/2024    HGBA1C 5.3 07/31/2024    TSH 0.541 07/31/2024      Review of patient's allergies indicates:   Allergen Reactions    Depakote [divalproex] Hives    Dilantin [phenytoin sodium extended] Hives    Tramadol      Lowers seizure threshold    Wellbutrin [bupropion hcl]      Lowers seizure threshold       Encounter Diagnosis   Name Primary?    Acute cystitis without hematuria Yes        No orders of the defined types were placed in this encounter.     Medications Ordered This Encounter   Medications    phenazopyridine (PYRIDIUM) 200 MG tablet     Sig: Take 1 tablet (200 mg total) by mouth 3 (three) times daily as needed for Pain.     Dispense:  9 tablet     Refill:  0    sulfamethoxazole-trimethoprim 800-160mg (BACTRIM DS) 800-160 mg Tab     Sig: Take 1 tablet by mouth 2 (two) times daily. for 3 days     Dispense:  6 tablet     Refill:  0        E-Visit Time Tracking:    Day 1 Time (in minutes): 11    Total Time (in minutes): 11      274}

## 2024-11-23 DIAGNOSIS — R04.0 EPISTAXIS: ICD-10-CM

## 2024-11-24 NOTE — TELEPHONE ENCOUNTER
Refill Routing Note   Medication(s) are not appropriate for processing by [facility] for the following reason(s):        Outside of protocol    [facility] action(s):  Route               Appointments  past 12m or future 3m with PCP    Date Provider   Last Visit   9/4/2024 Andre Kirk MD   Next Visit   Visit date not found Andre Kirk MD   ED visits in past 90 days: 0        Note composed:2:04 PM 11/24/2024

## 2024-11-25 RX ORDER — MUPIROCIN 20 MG/G
OINTMENT TOPICAL
Qty: 22 G | Refills: 11 | Status: SHIPPED | OUTPATIENT
Start: 2024-11-25

## 2024-12-19 ENCOUNTER — TELEPHONE (OUTPATIENT)
Dept: NEUROLOGY | Facility: CLINIC | Age: 44
End: 2024-12-19
Payer: MEDICARE

## 2024-12-19 NOTE — TELEPHONE ENCOUNTER
Left message for the pt to call in regards to a PA for EPIDIOLEX.  Pt was last seen in clinic 12/2024. Pt needs to schedule a f/o appt.

## 2024-12-21 DIAGNOSIS — I10 HYPERTENSION, UNSPECIFIED TYPE: ICD-10-CM

## 2024-12-21 RX ORDER — LOSARTAN POTASSIUM 50 MG/1
50 TABLET ORAL NIGHTLY
Qty: 90 TABLET | Refills: 2 | Status: SHIPPED | OUTPATIENT
Start: 2024-12-21

## 2024-12-21 NOTE — TELEPHONE ENCOUNTER
No care due was identified.  [facility] Embedded Care Due Messages. Reference number: [id number].   12/21/2024 3:24:32 AM CST

## 2024-12-21 NOTE — TELEPHONE ENCOUNTER
Refill Decision Note   Annie Brie  is requesting a refill authorization.  Brief Assessment and Rationale for Refill:  Approve     Medication Therapy Plan:       Medication Reconciliation Completed: No   Comments:     No Care Gaps recommended.     Note composed:8:18 AM 12/21/2024

## 2025-01-30 DIAGNOSIS — Z00.00 ENCOUNTER FOR MEDICARE ANNUAL WELLNESS EXAM: ICD-10-CM

## 2025-02-21 ENCOUNTER — PATIENT MESSAGE (OUTPATIENT)
Dept: FAMILY MEDICINE | Facility: CLINIC | Age: 45
End: 2025-02-21
Payer: MEDICARE

## 2025-02-21 ENCOUNTER — TELEPHONE (OUTPATIENT)
Dept: FAMILY MEDICINE | Facility: CLINIC | Age: 45
End: 2025-02-21
Payer: MEDICARE

## 2025-02-21 DIAGNOSIS — N63.0 MASS OF BREAST, UNSPECIFIED LATERALITY: ICD-10-CM

## 2025-02-21 DIAGNOSIS — N63.0 MASS OF BREAST, UNSPECIFIED LATERALITY: Primary | ICD-10-CM

## 2025-02-21 DIAGNOSIS — R92.30 DENSE BREAST TISSUE ON MAMMOGRAM, UNSPECIFIED TYPE: ICD-10-CM

## 2025-02-21 DIAGNOSIS — R92.30 DENSE BREAST TISSUE ON MAMMOGRAM, UNSPECIFIED TYPE: Primary | ICD-10-CM

## 2025-02-21 NOTE — TELEPHONE ENCOUNTER
----- Message from Abigail sent at 2/21/2025  2:43 PM CST -----  Contact: Ms Acevedo from [facility]  Type:  Needs Medical AdviceWho Called:  [facility] rather a call back or a response via [facility]?   Call backBest Call Back Number:    [phone] - Ms AcevedoAdditional Information:   States they received orders for mammogram and ultrasound - states she has questions - please call - thank you

## 2025-02-24 RX ORDER — MONTELUKAST SODIUM 10 MG/1
10 TABLET ORAL NIGHTLY
Qty: 90 TABLET | Refills: 1 | Status: SHIPPED | OUTPATIENT
Start: 2025-02-24

## 2025-02-24 NOTE — TELEPHONE ENCOUNTER
No care due was identified.  [facility] Embedded Care Due Messages. Reference number: [id number].   2/24/2025 3:20:28 PM CST

## 2025-02-24 NOTE — TELEPHONE ENCOUNTER
Refill Decision Note   Annie Brie  is requesting a refill authorization.  Brief Assessment and Rationale for Refill:  Approve     Medication Therapy Plan:         Alert overridden per protocol: Yes   Comments:     Note composed:5:05 PM 02/24/2025

## 2025-03-05 ENCOUNTER — E-VISIT (OUTPATIENT)
Dept: FAMILY MEDICINE | Facility: CLINIC | Age: 45
End: 2025-03-05
Payer: MEDICARE

## 2025-03-05 ENCOUNTER — PATIENT MESSAGE (OUTPATIENT)
Dept: FAMILY MEDICINE | Facility: CLINIC | Age: 45
End: 2025-03-05
Payer: MEDICARE

## 2025-03-05 DIAGNOSIS — M54.50 CHRONIC MIDLINE LOW BACK PAIN, UNSPECIFIED WHETHER SCIATICA PRESENT: Primary | ICD-10-CM

## 2025-03-05 DIAGNOSIS — Z12.31 ENCOUNTER FOR SCREENING MAMMOGRAM FOR MALIGNANT NEOPLASM OF BREAST: Primary | ICD-10-CM

## 2025-03-05 DIAGNOSIS — G89.29 CHRONIC MIDLINE LOW BACK PAIN, UNSPECIFIED WHETHER SCIATICA PRESENT: Primary | ICD-10-CM

## 2025-03-05 DIAGNOSIS — R11.0 NAUSEA: ICD-10-CM

## 2025-03-05 DIAGNOSIS — M54.50 CHRONIC MIDLINE LOW BACK PAIN WITHOUT SCIATICA: Primary | ICD-10-CM

## 2025-03-05 DIAGNOSIS — G89.29 CHRONIC MIDLINE LOW BACK PAIN WITHOUT SCIATICA: Primary | ICD-10-CM

## 2025-03-05 RX ORDER — PERPHENAZINE 16 MG
600 TABLET ORAL DAILY
Qty: 90 EACH | Refills: 3 | Status: SHIPPED | OUTPATIENT
Start: 2025-03-05

## 2025-03-05 RX ORDER — ONDANSETRON 4 MG/1
4 TABLET, ORALLY DISINTEGRATING ORAL EVERY 6 HOURS PRN
Qty: 30 TABLET | Refills: 1 | Status: SHIPPED | OUTPATIENT
Start: 2025-03-05 | End: 2025-04-04

## 2025-03-05 RX ORDER — ONDANSETRON 4 MG/1
8 TABLET, FILM COATED ORAL EVERY 8 HOURS PRN
Qty: 40 TABLET | Refills: 0 | OUTPATIENT
Start: 2025-03-05 | End: 2025-03-15

## 2025-03-05 RX ORDER — LIDOCAINE 50 MG/G
1 PATCH TOPICAL DAILY
Qty: 30 PATCH | Refills: 2 | Status: SHIPPED | OUTPATIENT
Start: 2025-03-05

## 2025-03-05 RX ORDER — IBUPROFEN 800 MG/1
800 TABLET ORAL 3 TIMES DAILY
Qty: 90 TABLET | Refills: 0 | OUTPATIENT
Start: 2025-03-05 | End: 2025-04-04

## 2025-03-05 NOTE — TELEPHONE ENCOUNTER
No care due was identified.  [facility] Embedded Care Due Messages. Reference number: [id number].   3/05/2025 11:00:25 AM CST

## 2025-03-05 NOTE — PROGRESS NOTES
Patient ID: Annie Baird is a 45 y.o. female.    Chief Complaint: Medication Management (Entered automatically based on patient selection in [facility].)             274}  The patient initiated a request through [facility] on 3/5/2025 for evaluation and management with a chief complaint of Medication Management (Entered automatically based on patient selection in [facility].)     I evaluated the questionnaire submission on 03/05/2025 .    Total Time (in minutes): 13     Ohs Peq Evisit Medication    3/5/2025  3:53 PM CST - Filed by Patient   Do you agree to participate in an E-Visit? Yes   If you have any of the following symptoms, please present to your local emergency room or call 911:  I acknowledge   Medication requests for narcotics will not be addressed via an E-Visit.  Please schedule an appointment. I acknowledge   Choose the state of your primary residence [place]   Do you have any of the following pregnancy-related conditions? None   Do you want to address a new or existing medication? I would like to start a new medication that I do not already take   What is the main issue you would like addressed today? Back pain/nausea/headaches   What is the name of the medication that you would like to start? Ibuprofen / zofran   Have you taken a similar medication in the past? Yes   Provide any additional information you feel is important. These medications help me in the past with paying from like the inflammation headaches and the nausea   Please attach any relevant images or files    Are you able to take your vital signs? 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    B12 deficiency 05/18/2021    Chronic pain syndrome 05/18/2021    Raynaud's phenomenon 05/18/2021    Gastroesophageal reflux disease 05/07/2021    Mild intermittent asthma without complication 05/07/2021    Seizure disorder 05/07/2021    Chronic midline low back pain 05/07/2021    Primary hypertension 05/07/2021    Hemiparesis affecting left side as late effect of stroke 05/07/2021    Debility 05/07/2021    ALESSIA (generalized anxiety disorder) 05/07/2021    Morbid obesity 05/07/2021    Overactive bladder 05/07/2021      Recent Labs Obtained:  Lab Results   Component Value Date    WBC 10.74 07/31/2024    HGB 12.5 07/31/2024    HCT 41.4 07/31/2024    MCV 89 07/31/2024     07/31/2024     07/31/2024    K 4.1 07/31/2024     07/31/2024    CREATININE 0.9 07/31/2024    EGFRNORACEVR >60.0 07/31/2024    HGBA1C 5.3 07/31/2024    TSH 0.541 07/31/2024      Review of patient's allergies indicates:   Allergen Reactions    Depakote [divalproex] Hives    Dilantin [phenytoin sodium extended] Hives    Tramadol      Lowers seizure threshold    Wellbutrin [bupropion hcl]      Lowers seizure threshold       Encounter Diagnoses   Name Primary?    Chronic midline low back pain without sciatica Yes    Nausea         No orders of the defined types were placed in this encounter.     Medications Ordered This Encounter   Medications    alpha lipoic acid 600 mg Cap     Sig: Take 600 mg by mouth once daily.     Dispense:  90 each     Refill:  3    LIDOcaine (LIDODERM) 5 %     Sig: Place 1 patch onto the skin once daily. Remove & Discard patch within 12 hours or as directed by MD     Dispense:  30 patch     Refill:  2    ondansetron (ZOFRAN-ODT) 4 MG TbDL     Sig: Take 1 tablet (4 mg total) by mouth every 6 (six) hours as needed (nausea).     Dispense:  30 tablet     Refill:  1        E-Visit Time Tracking:    Day 1 Time (in minutes): 13    Total  Time (in minutes): 13         107}

## 2025-03-07 ENCOUNTER — PATIENT MESSAGE (OUTPATIENT)
Dept: FAMILY MEDICINE | Facility: CLINIC | Age: 45
End: 2025-03-07
Payer: MEDICARE

## 2025-03-11 ENCOUNTER — PATIENT MESSAGE (OUTPATIENT)
Dept: ADMINISTRATIVE | Facility: HOSPITAL | Age: 45
End: 2025-03-11
Payer: MEDICARE

## 2025-03-31 ENCOUNTER — PATIENT MESSAGE (OUTPATIENT)
Dept: ADMINISTRATIVE | Facility: HOSPITAL | Age: 45
End: 2025-03-31
Payer: MEDICARE

## 2025-03-31 ENCOUNTER — PATIENT OUTREACH (OUTPATIENT)
Dept: ADMINISTRATIVE | Facility: HOSPITAL | Age: 45
End: 2025-03-31
Payer: MEDICARE

## 2025-03-31 NOTE — PROGRESS NOTES
Population Health Chart Review & Patient Outreach Details    Outreach Performed: YES Portal Active and/or Letter inactive    Additional [facility] Health Notes:    BREAST CANCER SCREENING    Non-compliant report chart audits for BREAST CANCER SCREENING     Outreach to patient in reference to SCHEDULING A MAMMOGRAM EXAM.            Updates Requested / Reviewed:               Health Maintenance Topics Overdue:      VBHM Score: 2     Colon Cancer Screening  Mammogram

## 2025-04-11 ENCOUNTER — PATIENT MESSAGE (OUTPATIENT)
Dept: FAMILY MEDICINE | Facility: CLINIC | Age: 45
End: 2025-04-11
Payer: MEDICARE

## 2025-04-15 ENCOUNTER — PATIENT MESSAGE (OUTPATIENT)
Dept: FAMILY MEDICINE | Facility: CLINIC | Age: 45
End: 2025-04-15
Payer: MEDICARE

## 2025-04-15 DIAGNOSIS — N39.46 MIXED INCONTINENCE: ICD-10-CM

## 2025-04-15 DIAGNOSIS — J32.9 SINUSITIS, UNSPECIFIED CHRONICITY, UNSPECIFIED LOCATION: ICD-10-CM

## 2025-04-15 DIAGNOSIS — G40.909 SEIZURE DISORDER: ICD-10-CM

## 2025-04-15 DIAGNOSIS — G89.29 CHRONIC MIDLINE LOW BACK PAIN, UNSPECIFIED WHETHER SCIATICA PRESENT: ICD-10-CM

## 2025-04-15 DIAGNOSIS — J30.89 NON-SEASONAL ALLERGIC RHINITIS, UNSPECIFIED TRIGGER: ICD-10-CM

## 2025-04-15 DIAGNOSIS — M54.50 CHRONIC MIDLINE LOW BACK PAIN, UNSPECIFIED WHETHER SCIATICA PRESENT: ICD-10-CM

## 2025-04-15 DIAGNOSIS — K21.9 GASTROESOPHAGEAL REFLUX DISEASE, UNSPECIFIED WHETHER ESOPHAGITIS PRESENT: ICD-10-CM

## 2025-04-15 DIAGNOSIS — M54.50 CHRONIC MIDLINE LOW BACK PAIN WITHOUT SCIATICA: ICD-10-CM

## 2025-04-15 DIAGNOSIS — E78.2 MIXED HYPERLIPIDEMIA: ICD-10-CM

## 2025-04-15 DIAGNOSIS — I10 HYPERTENSION, UNSPECIFIED TYPE: ICD-10-CM

## 2025-04-15 DIAGNOSIS — R35.0 URINARY FREQUENCY: ICD-10-CM

## 2025-04-15 DIAGNOSIS — R35.1 NOCTURIA: ICD-10-CM

## 2025-04-15 DIAGNOSIS — G89.29 CHRONIC MIDLINE LOW BACK PAIN WITHOUT SCIATICA: ICD-10-CM

## 2025-04-16 ENCOUNTER — PATIENT MESSAGE (OUTPATIENT)
Dept: FAMILY MEDICINE | Facility: CLINIC | Age: 45
End: 2025-04-16

## 2025-04-16 ENCOUNTER — E-VISIT (OUTPATIENT)
Dept: FAMILY MEDICINE | Facility: CLINIC | Age: 45
End: 2025-04-16

## 2025-04-16 ENCOUNTER — PATIENT MESSAGE (OUTPATIENT)
Dept: ADMINISTRATIVE | Facility: OTHER | Age: 45
End: 2025-04-16

## 2025-04-16 VITALS — SYSTOLIC BLOOD PRESSURE: 169 MMHG | DIASTOLIC BLOOD PRESSURE: 95 MMHG

## 2025-04-16 DIAGNOSIS — R79.9 ABNORMAL FINDING OF BLOOD CHEMISTRY, UNSPECIFIED: ICD-10-CM

## 2025-04-16 DIAGNOSIS — I10 HYPERTENSION, UNSPECIFIED TYPE: Primary | ICD-10-CM

## 2025-04-16 DIAGNOSIS — E78.2 MIXED HYPERLIPIDEMIA: ICD-10-CM

## 2025-04-16 DIAGNOSIS — J45.20 MILD INTERMITTENT ASTHMA WITHOUT COMPLICATION: ICD-10-CM

## 2025-04-16 RX ORDER — MONTELUKAST SODIUM 10 MG/1
10 TABLET ORAL NIGHTLY
Qty: 90 TABLET | Refills: 1 | Status: SHIPPED | OUTPATIENT
Start: 2025-04-16

## 2025-04-16 RX ORDER — LEVETIRACETAM 750 MG/1
1500 TABLET ORAL 2 TIMES DAILY
Qty: 360 TABLET | Refills: 0 | Status: SHIPPED | OUTPATIENT
Start: 2025-04-16

## 2025-04-16 RX ORDER — GABAPENTIN 600 MG/1
600 TABLET ORAL 3 TIMES DAILY
Qty: 270 TABLET | Refills: 3 | Status: SHIPPED | OUTPATIENT
Start: 2025-04-16 | End: 2026-04-11

## 2025-04-16 RX ORDER — LAMOTRIGINE 150 MG/1
300 TABLET ORAL 2 TIMES DAILY
Qty: 360 TABLET | Refills: 0 | Status: SHIPPED | OUTPATIENT
Start: 2025-04-16

## 2025-04-16 RX ORDER — LOSARTAN POTASSIUM 50 MG/1
50 TABLET ORAL NIGHTLY
Qty: 90 TABLET | Refills: 2 | Status: SHIPPED | OUTPATIENT
Start: 2025-04-16 | End: 2025-04-16

## 2025-04-16 RX ORDER — OMEPRAZOLE 40 MG/1
40 CAPSULE, DELAYED RELEASE ORAL EVERY MORNING
Qty: 90 CAPSULE | Refills: 3 | Status: SHIPPED | OUTPATIENT
Start: 2025-04-16

## 2025-04-16 RX ORDER — SOLIFENACIN SUCCINATE 10 MG/1
10 TABLET, FILM COATED ORAL DAILY
Qty: 90 TABLET | Refills: 0 | Status: SHIPPED | OUTPATIENT
Start: 2025-04-16

## 2025-04-16 RX ORDER — NORTRIPTYLINE HYDROCHLORIDE 50 MG/1
50 CAPSULE ORAL NIGHTLY
Qty: 90 CAPSULE | Refills: 3 | Status: SHIPPED | OUTPATIENT
Start: 2025-04-16 | End: 2026-04-16

## 2025-04-16 RX ORDER — AMLODIPINE AND VALSARTAN 5; 320 MG/1; MG/1
1 TABLET ORAL DAILY
Qty: 30 TABLET | Refills: 3 | Status: SHIPPED | OUTPATIENT
Start: 2025-04-16

## 2025-04-16 RX ORDER — PERPHENAZINE 16 MG
600 TABLET ORAL DAILY
Qty: 90 EACH | Refills: 3 | Status: SHIPPED | OUTPATIENT
Start: 2025-04-16

## 2025-04-16 RX ORDER — LIDOCAINE 50 MG/G
1 PATCH TOPICAL DAILY
Qty: 30 PATCH | Refills: 2 | Status: SHIPPED | OUTPATIENT
Start: 2025-04-16

## 2025-04-16 RX ORDER — LAMOTRIGINE 25 MG/1
25 TABLET ORAL 2 TIMES DAILY
Qty: 180 TABLET | Refills: 0 | Status: SHIPPED | OUTPATIENT
Start: 2025-04-16

## 2025-04-16 RX ORDER — METOPROLOL SUCCINATE 50 MG/1
50 TABLET, EXTENDED RELEASE ORAL DAILY
Qty: 90 TABLET | Refills: 3 | Status: SHIPPED | OUTPATIENT
Start: 2025-04-16

## 2025-04-16 RX ORDER — CETIRIZINE HYDROCHLORIDE 10 MG/1
10 TABLET ORAL DAILY
Qty: 90 TABLET | Refills: 3 | Status: SHIPPED | OUTPATIENT
Start: 2025-04-16 | End: 2026-04-11

## 2025-04-16 RX ORDER — ROSUVASTATIN CALCIUM 20 MG/1
20 TABLET, COATED ORAL DAILY
Qty: 90 TABLET | Refills: 3 | Status: SHIPPED | OUTPATIENT
Start: 2025-04-16 | End: 2026-04-16

## 2025-04-16 RX ORDER — AZELASTINE 1 MG/ML
2 SPRAY, METERED NASAL 2 TIMES DAILY
Qty: 90 ML | Refills: 11 | Status: SHIPPED | OUTPATIENT
Start: 2025-04-16

## 2025-04-16 NOTE — PROGRESS NOTES
Patient ID: Annie Baird is a 45 y.o. female.    Chief Complaint: Hypertension (Entered automatically based on patient selection in [facility].)          274}  The patient initiated a request through [facility] on 4/16/2025 for evaluation and management with a chief complaint of Hypertension (Entered automatically based on patient selection in [facility].)     I evaluated the questionnaire submission on 04/16/2025 .    Total Time (in minutes): 14     Ohs Peq Evisit Hypertension    4/16/2025  2:05 PM CDT - Filed by Patient   Do you agree to participate in an E-Visit? Yes   If you have any of the following symptoms, please do not complete an E-Visit. Instead, schedule an appointment with your provider I acknowledge   Choose the state of your primary residence [place]   Do you have any of the following pregnancy-related conditions? None   What would you like addressed about your blood pressure? New concern   What is the main issue you would like addressed today? Head ache ,body ache, high bp,leg pain, nausea, palpitations   Your blood pressure is a: Recurring problem   When were you first diagnosed with high blood pressure? More than 1 year ago   Since you first learned you had high blood pressure, how has it changed? Rapidly worsening   How would you classify your blood pressure? Out of control   Are you having any of the following symptoms from your high blood pressure? Blurred vision;  Headache;  Fatigue;  Neck pain;  Pounding in the chest   Are you taking any of the following medications? Over-the-counter pain and fever relievers   The following factors can make high blood pressure worse or harder to control. Which of them might be contributing to your high blood pressure?  Pain   Have you taken blood pressure medications in the past that caused you problems or side effects? No   Are you currently taking medication(s) for your blood pressure? Yes   Have you recently started a new medication or changed your dose?  No   How often are you taking your medication per week?  Every day   Have you had any side effects from your current blood pressure medication? No   Are you able to take your blood pressure? Yes   Please give your most recent blood pressure readings    Reading 1 150/80 04/15/2025 2:00pm   Reading 2 165/95 04/15/2025 1:00pm   Reading 3    Reading 4    Reading 5    If you are able to take your pulse, please provide it below.    Provide any additional information you feel is important. Higher then normal bp, dizziness,major headaches,legs hurt,body aches all over, nausea, on & off confused, palpitations, neck pain & can't sleep due to pain i believe. When i finally fall asleep i wake up 30-45 mins later feeling worse   Please attach any relevant images or files    Are you able to take any other vitals? 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    B12 deficiency 05/18/2021    Chronic pain syndrome 05/18/2021    Raynaud's phenomenon 05/18/2021    Gastroesophageal reflux disease 05/07/2021    Mild intermittent asthma without complication 05/07/2021    Seizure disorder 05/07/2021    Chronic midline low back pain 05/07/2021    Primary hypertension 05/07/2021    Hemiparesis affecting left side as late effect of stroke 05/07/2021    Debility 05/07/2021    ALESSIA (generalized anxiety disorder) 05/07/2021    Morbid obesity 05/07/2021    Overactive bladder 05/07/2021      Recent Labs Obtained:  Lab Results   Component Value Date    WBC 10.74 07/31/2024    HGB 12.5 07/31/2024    HCT 41.4 07/31/2024    MCV 89  07/31/2024     07/31/2024     07/31/2024    K 4.1 07/31/2024     07/31/2024    CREATININE 0.9 07/31/2024    EGFRNORACEVR >60.0 07/31/2024    HGBA1C 5.3 07/31/2024    TSH 0.541 07/31/2024      Review of patient's allergies indicates:   Allergen Reactions    Depakote [divalproex] Hives    Dilantin [phenytoin sodium extended] Hives    Tramadol      Lowers seizure threshold    Wellbutrin [bupropion hcl]      Lowers seizure threshold       Encounter Diagnoses   Name Primary?    Hypertension, unspecified type Yes    Mild intermittent asthma without complication     Mixed hyperlipidemia     Abnormal finding of blood chemistry, unspecified         Orders Placed This Encounter   Procedures    TSH     Standing Status:   Future     Expected Date:   4/16/2025     Expiration Date:   7/15/2026     Send normal result to authorizing provider's In Basket if patient is active on MyChart::   No    Lipid Panel     Standing Status:   Future     Expected Date:   7/16/2025     Expiration Date:   4/17/2026     Send normal result to authorizing provider's In Basket if patient is active on MyChart::   No    Hemoglobin A1C     Standing Status:   Future     Expected Date:   4/16/2025     Expiration Date:   6/15/2026     Send normal result to authorizing provider's In Basket if patient is active on MyChart::   No    CBC Without Differential     Standing Status:   Future     Expected Date:   4/30/2025     Expiration Date:   6/15/2026     Send normal result to authorizing provider's In Basket if patient is active on MyChart::   No    Comprehensive Metabolic Panel     Standing Status:   Future     Expected Date:   4/16/2025     Expiration Date:   6/15/2026     Send normal result to authorizing provider's In Basket if patient is active on MyChart::   No    Magnesium     Standing Status:   Future     Expected Date:   4/16/2025     Expiration Date:   6/15/2026     Send normal result to authorizing provider's In Basket if patient  is active on MyChart::   Yes    Magnesium, RBC     Standing Status:   Future     Expected Date:   4/16/2025     Expiration Date:   7/15/2026     Send normal result to authorizing provider's In Basket if patient is active on MyChart::   No      Medications Ordered This Encounter   Medications    amlodipine-valsartan (EXFORGE) 5-320 mg per tablet     Sig: Take 1 tablet by mouth once daily.     Dispense:  30 tablet     Refill:  3     .      Recommended patient to come in to clinic and I can see her in person would be glad to see her even though a full schedule and can over book and she declined and understands the risks.  E-Visit Time Tracking:    Day 1 Time (in minutes): 14    Total Time (in minutes): 14      274}

## 2025-04-16 NOTE — TELEPHONE ENCOUNTER
No care due was identified.  [facility] Embedded Care Due Messages. Reference number: [id number].   4/16/2025 8:56:47 AM CDT

## 2025-06-21 ENCOUNTER — PATIENT MESSAGE (OUTPATIENT)
Dept: FAMILY MEDICINE | Facility: CLINIC | Age: 45
End: 2025-06-21
Payer: MEDICARE

## 2025-06-23 RX ORDER — ONDANSETRON 4 MG/1
8 TABLET, FILM COATED ORAL EVERY 8 HOURS PRN
Status: CANCELLED | OUTPATIENT
Start: 2025-06-23

## 2025-06-23 NOTE — TELEPHONE ENCOUNTER
Care Due:                  Date            Visit Type   Department     Provider  --------------------------------------------------------------------------------                                EP -                              PRIMARY      [facility]  Last Visit: 07-      CARE ([facility])   MARGIE Kirk                              EP -                              PRIMARY      [facility]  Next Visit: 08-      CARE ([facility])   MEDICINE       Andre Kirk                                                            Last  Test          Frequency    Reason                     Performed    Due Date  --------------------------------------------------------------------------------    CMP.........  12 months..  amlodipine-valsartan,      07- 07-                             rosuvastatin.............    Lipid Panel.  12 months..  rosuvastatin.............  07- 07-    Health [facility] Embedded Care Due Messages. Reference number: [id number].   6/23/2025 8:06:44 AM CDT

## 2025-08-03 DIAGNOSIS — N39.46 MIXED INCONTINENCE: ICD-10-CM

## 2025-08-03 DIAGNOSIS — R35.1 NOCTURIA: ICD-10-CM

## 2025-08-03 DIAGNOSIS — R35.0 URINARY FREQUENCY: ICD-10-CM

## 2025-08-03 DIAGNOSIS — G40.909 SEIZURE DISORDER: ICD-10-CM

## 2025-08-03 NOTE — TELEPHONE ENCOUNTER
Care Due:                  Date            Visit Type   Department     Provider  --------------------------------------------------------------------------------                                EP -                              PRIMARY      [facility]  Last Visit: 07-      CARE ([facility])   MEDICINE       Andre Kirk  Next Visit: None Scheduled  None         None Found                                                            Last  Test          Frequency    Reason                     Performed    Due Date  --------------------------------------------------------------------------------    Office Visit  15 months..  amlodipine-valsartan,      07-   10-                             azelastine, cetirizine,                             metoprolol, montelukast,                             nortriptyline,                             omeprazole, rosuvastatin,                             solifenacin..............    [employer] Embedded Care Due Messages. Reference number: [id number].   8/03/2025 3:24:00 AM CDT

## 2025-08-04 RX ORDER — SOLIFENACIN SUCCINATE 10 MG/1
10 TABLET, FILM COATED ORAL
Qty: 90 TABLET | Refills: 0 | Status: SHIPPED | OUTPATIENT
Start: 2025-08-04

## 2025-08-04 RX ORDER — LAMOTRIGINE 25 MG/1
25 TABLET ORAL 2 TIMES DAILY
Qty: 180 TABLET | Refills: 0 | Status: SHIPPED | OUTPATIENT
Start: 2025-08-04

## 2025-08-04 RX ORDER — LAMOTRIGINE 150 MG/1
300 TABLET ORAL 2 TIMES DAILY
Qty: 360 TABLET | Refills: 0 | Status: SHIPPED | OUTPATIENT
Start: 2025-08-04

## 2025-08-04 NOTE — TELEPHONE ENCOUNTER
Refill Routing Note   Medication(s) are not appropriate for processing by [facility] for the following reason(s):        Outside of protocol  Required labs outdated    [facility] action(s):  Defer  Route   Requires appointment : Yes               Appointments  past 12m or future 3m with PCP    Date Provider   Last Visit   4/16/2025 Andre Kirk MD   Next Visit   Visit date not found Andre Kirk MD   ED visits in past 90 days: 0        Note composed:11:09 AM 08/04/2025

## 2025-08-20 ENCOUNTER — PATIENT MESSAGE (OUTPATIENT)
Dept: ADMINISTRATIVE | Facility: HOSPITAL | Age: 45
End: 2025-08-20
Payer: MEDICARE

## 2025-08-24 DIAGNOSIS — G40.909 SEIZURE DISORDER: ICD-10-CM

## 2025-08-27 DIAGNOSIS — M54.50 CHRONIC MIDLINE LOW BACK PAIN WITHOUT SCIATICA: ICD-10-CM

## 2025-08-27 DIAGNOSIS — G89.29 CHRONIC MIDLINE LOW BACK PAIN WITHOUT SCIATICA: ICD-10-CM

## 2025-08-27 RX ORDER — LIDOCAINE 50 MG/G
PATCH TOPICAL
Qty: 30 PATCH | Refills: 2 | Status: SHIPPED | OUTPATIENT
Start: 2025-08-27

## 2025-08-28 RX ORDER — LEVETIRACETAM 750 MG/1
TABLET ORAL
Qty: 360 TABLET | Refills: 0 | Status: SHIPPED | OUTPATIENT
Start: 2025-08-28

## 2025-09-05 ENCOUNTER — TELEPHONE (OUTPATIENT)
Dept: FAMILY MEDICINE | Facility: CLINIC | Age: 45
End: 2025-09-05

## 2025-09-05 ENCOUNTER — E-VISIT (OUTPATIENT)
Dept: FAMILY MEDICINE | Facility: CLINIC | Age: 45
End: 2025-09-05
Payer: MEDICARE

## 2025-09-05 VITALS — SYSTOLIC BLOOD PRESSURE: 127 MMHG | DIASTOLIC BLOOD PRESSURE: 71 MMHG

## 2025-09-05 DIAGNOSIS — I10 HYPERTENSION, UNSPECIFIED TYPE: ICD-10-CM

## 2025-09-05 RX ORDER — AMLODIPINE AND VALSARTAN 5; 320 MG/1; MG/1
1 TABLET ORAL DAILY
Qty: 30 TABLET | Refills: 4 | Status: SHIPPED | OUTPATIENT
Start: 2025-09-05

## 2025-09-05 RX ORDER — AMLODIPINE AND VALSARTAN 5; 320 MG/1; MG/1
1 TABLET ORAL DAILY
Qty: 30 TABLET | Refills: 3 | OUTPATIENT
Start: 2025-09-05

## (undated) DEVICE — SYS LABEL CORRECT MED

## (undated) DEVICE — SYR DISP LL 5CC

## (undated) DEVICE — SYR GLASS 5CC LUER LOK

## (undated) DEVICE — GLOVE SURG ULTRA TOUCH 6

## (undated) DEVICE — DRAPE INCISE IOBAN 2 23X17IN

## (undated) DEVICE — KIT PROBE COVER WITH GEL

## (undated) DEVICE — DURAPREP SURG SCRUB 26ML

## (undated) DEVICE — ELECTRODE REM PLYHSV RETURN 9

## (undated) DEVICE — ADHESIVE DERMABOND ADVANCED

## (undated) DEVICE — TUBE FRAZIER 5MM 2FT SOFT TIP

## (undated) DEVICE — DRESSING SURGICAL 1/2X1/2

## (undated) DEVICE — CHLORAPREP 10.5 ML APPLICATOR

## (undated) DEVICE — TUBING MINIBORE EXTENSION

## (undated) DEVICE — SUT VICRYL PLUS 3-0 SH 18IN

## (undated) DEVICE — Device

## (undated) DEVICE — SEE MEDLINE ITEM 156905

## (undated) DEVICE — NDL SAFETY 25G X 1.5 ECLIPSE

## (undated) DEVICE — NDL HYPODERMIC BLUNT 18G 1.5IN

## (undated) DEVICE — DRAPE THYROID WITH ARMBOARD

## (undated) DEVICE — DEVICE PLASMABLADE X 3.0S LT

## (undated) DEVICE — GLOVE SURG ULTRA TOUCH 7.5

## (undated) DEVICE — CORD BIPOLAR 12 FOOT

## (undated) DEVICE — DRAPE T THYROID STERILE

## (undated) DEVICE — NDL HYPO SAFETY 25GX1.5IN

## (undated) DEVICE — MARKER SKIN STND TIP BLUE BARR

## (undated) DEVICE — NDL BLUNT W/O FILTER 18GX1.5IN

## (undated) DEVICE — SYR 10CC LUER LOCK

## (undated) DEVICE — NDL TUOHY EPIDURAL 20G X 3.5

## (undated) DEVICE — GLOVE SURGEONS ULTRA TOUCH 6.5